# Patient Record
Sex: FEMALE | Race: BLACK OR AFRICAN AMERICAN | NOT HISPANIC OR LATINO | URBAN - METROPOLITAN AREA
[De-identification: names, ages, dates, MRNs, and addresses within clinical notes are randomized per-mention and may not be internally consistent; named-entity substitution may affect disease eponyms.]

---

## 2017-09-21 ENCOUNTER — APPOINTMENT (OUTPATIENT)
Dept: WOMENS IMAGING | Facility: HOSPITAL | Age: 62
End: 2017-09-21

## 2017-09-21 PROCEDURE — 77067 SCR MAMMO BI INCL CAD: CPT | Performed by: RADIOLOGY

## 2018-10-18 ENCOUNTER — APPOINTMENT (OUTPATIENT)
Dept: WOMENS IMAGING | Facility: HOSPITAL | Age: 63
End: 2018-10-18

## 2018-10-18 PROCEDURE — 77063 BREAST TOMOSYNTHESIS BI: CPT | Performed by: RADIOLOGY

## 2018-10-18 PROCEDURE — 77067 SCR MAMMO BI INCL CAD: CPT | Performed by: RADIOLOGY

## 2018-11-02 ENCOUNTER — APPOINTMENT (OUTPATIENT)
Dept: WOMENS IMAGING | Facility: HOSPITAL | Age: 63
End: 2018-11-02

## 2018-11-02 PROCEDURE — 77065 DX MAMMO INCL CAD UNI: CPT | Performed by: RADIOLOGY

## 2018-11-02 PROCEDURE — 77061 BREAST TOMOSYNTHESIS UNI: CPT | Performed by: RADIOLOGY

## 2018-11-02 PROCEDURE — 76641 ULTRASOUND BREAST COMPLETE: CPT | Performed by: RADIOLOGY

## 2018-11-02 PROCEDURE — G0279 TOMOSYNTHESIS, MAMMO: HCPCS | Performed by: RADIOLOGY

## 2019-10-21 ENCOUNTER — APPOINTMENT (OUTPATIENT)
Dept: WOMENS IMAGING | Facility: HOSPITAL | Age: 64
End: 2019-10-21

## 2019-10-21 PROCEDURE — 77067 SCR MAMMO BI INCL CAD: CPT | Performed by: RADIOLOGY

## 2019-10-21 PROCEDURE — 77063 BREAST TOMOSYNTHESIS BI: CPT | Performed by: RADIOLOGY

## 2020-11-03 ENCOUNTER — APPOINTMENT (OUTPATIENT)
Dept: WOMENS IMAGING | Facility: HOSPITAL | Age: 65
End: 2020-11-03

## 2020-11-03 PROCEDURE — 77063 BREAST TOMOSYNTHESIS BI: CPT | Performed by: RADIOLOGY

## 2020-11-03 PROCEDURE — 77067 SCR MAMMO BI INCL CAD: CPT | Performed by: RADIOLOGY

## 2020-12-09 ENCOUNTER — APPOINTMENT (OUTPATIENT)
Dept: WOMENS IMAGING | Facility: HOSPITAL | Age: 65
End: 2020-12-09

## 2020-12-09 PROCEDURE — 77065 DX MAMMO INCL CAD UNI: CPT | Performed by: RADIOLOGY

## 2020-12-09 PROCEDURE — G0279 TOMOSYNTHESIS, MAMMO: HCPCS | Performed by: RADIOLOGY

## 2020-12-09 PROCEDURE — 76641 ULTRASOUND BREAST COMPLETE: CPT | Performed by: RADIOLOGY

## 2021-01-06 ENCOUNTER — APPOINTMENT (OUTPATIENT)
Dept: WOMENS IMAGING | Facility: HOSPITAL | Age: 66
End: 2021-01-06

## 2021-01-06 PROCEDURE — 19000 PUNCTURE ASPIR CYST BREAST: CPT | Performed by: RADIOLOGY

## 2021-01-06 PROCEDURE — 76942 ECHO GUIDE FOR BIOPSY: CPT | Performed by: RADIOLOGY

## 2021-01-06 PROCEDURE — 88305 TISSUE EXAM BY PATHOLOGIST: CPT

## 2021-01-06 PROCEDURE — 88173 CYTOPATH EVAL FNA REPORT: CPT

## 2021-10-29 ENCOUNTER — APPOINTMENT (OUTPATIENT)
Dept: WOMENS IMAGING | Facility: HOSPITAL | Age: 66
End: 2021-10-29

## 2021-10-29 PROCEDURE — G0279 TOMOSYNTHESIS, MAMMO: HCPCS | Performed by: RADIOLOGY

## 2021-10-29 PROCEDURE — 77066 DX MAMMO INCL CAD BI: CPT | Performed by: RADIOLOGY

## 2021-10-29 PROCEDURE — 76641 ULTRASOUND BREAST COMPLETE: CPT | Performed by: RADIOLOGY

## 2022-10-31 ENCOUNTER — APPOINTMENT (OUTPATIENT)
Dept: WOMENS IMAGING | Facility: HOSPITAL | Age: 67
End: 2022-10-31

## 2022-10-31 PROCEDURE — 77063 BREAST TOMOSYNTHESIS BI: CPT | Performed by: RADIOLOGY

## 2022-10-31 PROCEDURE — 77067 SCR MAMMO BI INCL CAD: CPT | Performed by: RADIOLOGY

## 2023-11-03 ENCOUNTER — APPOINTMENT (OUTPATIENT)
Dept: WOMENS IMAGING | Facility: HOSPITAL | Age: 68
End: 2023-11-03
Payer: MEDICARE

## 2023-11-03 PROCEDURE — 77067 SCR MAMMO BI INCL CAD: CPT | Performed by: RADIOLOGY

## 2023-11-03 PROCEDURE — 77063 BREAST TOMOSYNTHESIS BI: CPT | Performed by: RADIOLOGY

## 2023-12-07 ENCOUNTER — TELEPHONE (OUTPATIENT)
Dept: ONCOLOGY | Facility: CLINIC | Age: 68
End: 2023-12-07
Payer: MEDICARE

## 2023-12-07 NOTE — TELEPHONE ENCOUNTER
Since I have not seen her or reviewed her information in detail yet, I'm not going to comment on that. Right now that is up to her and the physicians she has already been seeing. OLINDA

## 2023-12-07 NOTE — TELEPHONE ENCOUNTER
Since I have not seen her or reviewed her information in detail yet, I'm not going to comment on that. Right now that is up to her and the physicians she has already been seeing. OLINDA        Called the patient to let her know Dr. Palacios response above and she v/u.

## 2023-12-07 NOTE — TELEPHONE ENCOUNTER
Caller: Eveline Govea    Relationship: Self    Best call back number: [phone]    What is the best time to reach you: ANYTIME    Who are you requesting to speak with (clinical staff, provider,  specific staff member): CLINICAL         What was the call regarding:     HAS NEW PATIENT APPOINTMENT WITH DR MARIN.     WAS REFERRED BY DR ANAND AND DR ANAND IS RECOMMENDING GOING A HEAD AND GETTING A PORT PLACED    WANTED TO KNOW WHAT DR MARIN WOULD LIKE IF WOULD WANT HER TO GET PORT PLACED BEFORE CONSULT OR TO WAIT?     IT IS CURRENTLY SCHEDULED THIS COMING MONDAY DEC 11TH

## 2023-12-13 NOTE — PROGRESS NOTES
REFERRING PROVIDER:    Alhaji Butler MD  [address]  [address]  [place] [address]    REASON FOR CONSULTATION: Metastatic pancreas cancer    HISTORY OF PRESENT ILLNESS:  Eveline Govea is a 68 y.o. female who is referred today metastatic pancreas cancer    She has followed with Dr. Marshall with hematology at [place] for neutropenia    She presented with a several month history of abdominal pain.    11/22/2023: CT abdomen and pelvis with peritoneal carcinomatosis, small volume ascites, abnormal appearance of the body and tail of the pancreas consistent with primary pancreas malignancy with extrapancreatic extension and vascular involvement, shotty retroperitoneal lymphadenopathy, bladder wall thickening, nonocclusive thrombus of the left ovarian vein, noncalcified pulmonary nodules.    11/29/2023: Endoscopic ultrasound with biopsy of the pancreas mass showed adenocarcinoma    12/2/2023: PET CT scan with evident hypermetabolic malignancy in the pancreas body with evidence for peritoneal carcinomatosis with multiple hypermetabolic peritoneal implants, indeterminant small 3 mm right lower lobe lung nodule.    She had a port placed by Dr. Crooks with surgical oncology earlier this week.  On 12/13/2023 she saw Dr. Castillo with medical oncology at [place].  A clinical trial may be available there.  She has been referred to genetic counseling due to an extensive family history of breast and other cancers.  She states that she had BRCA testing done a number of years ago that was negative.  It appears that Dr. Castillo is evaluating for mutations otherwise as he evaluates her for candidacy of the clinical trial.  Otherwise he discussed palliative chemotherapy options including modified FOLFIRINOX and Gemzar/Abraxane.  She is also going to see medical oncologist at the [facility] next week as well.    She reports some mild abdominal discomfort.  She has not required any pain medication for this.  She  "denies any signs or symptoms of bowel obstruction.  She has a decreased appetite.  She is anxious.    Past Medical History:   Diagnosis Date    Arthritis     H/O mammogram 09/01/2015    Dr Lizzy Gary    Hypertension     Lupus     Pancreatic cancer 2023       Past Surgical History:   Procedure Laterality Date    COLONOSCOPY  11/2014    Dr Rodgers    FOOT SURGERY  01/01/1990    Dr Urbina    HYSTERECTOMY  1999    Dr Amin    TOE SURGERY Bilateral     TUBAL ABDOMINAL LIGATION  1984    Dr Collins       SOCIAL HISTORY:   reports that she has never smoked. She does not have any smokeless tobacco history on file. Drug use questions deferred to the physician. She reports that she does not drink alcohol.    FAMILY HISTORY:  family history includes Breast cancer in her mother; Cancer in her mother and sister; Diabetes in her father and mother; Heart attack in her father and mother; Heart disease in her father; Hypertension in her father and mother; Kidney failure in her mother.    ALLERGIES:  Allergies   Allergen Reactions    Ace Inhibitors        MEDICATIONS:  The medication list has been reviewed with the patient by the medical assistant, and the list has been updated in the electronic medical record, which I reviewed.  Medication dosages and frequencies were confirmed to be accurate.    REVIEW OF SYSTEMS  Review of Systems   Gastrointestinal:  Positive for abdominal pain.   Psychiatric/Behavioral:  The patient is nervous/anxious.    All other systems reviewed and are negative.      PHYSICAL EXAMINATION  Vitals:    12/14/23 0915   BP: 121/68   Pulse: 71   Resp: 18   Temp: 98.9 °F (37.2 °C)   TempSrc: Temporal   SpO2: 99%   Weight: 59.4 kg (130 lb 14.4 oz)   Height: 157.5 cm (62\")   PainSc:   4   PainLoc: Abdomen       Physical Exam  Vitals reviewed.   Constitutional:       Appearance: She is well-developed.   HENT:      Head: Normocephalic and atraumatic.      Nose: Nose normal.   Eyes:      Conjunctiva/sclera: Conjunctivae " normal.      Pupils: Pupils are equal, round, and reactive to light.   Cardiovascular:      Rate and Rhythm: Normal rate and regular rhythm.      Heart sounds: Normal heart sounds, S1 normal and S2 normal. No murmur heard.     No friction rub. No gallop.   Pulmonary:      Effort: Pulmonary effort is normal. No respiratory distress.      Breath sounds: Normal breath sounds. No stridor. No wheezing, rhonchi or rales.   Chest:      Chest wall: No tenderness.      Comments: Benign-appearing recently placed Mediport present in the right subclavian area  Abdominal:      General: Bowel sounds are normal. There is distension (Very mild).      Palpations: Abdomen is soft. There is no mass.      Tenderness: There is no abdominal tenderness (Very mild). There is no guarding or rebound.   Musculoskeletal:         General: Normal range of motion.      Cervical back: Neck supple.   Lymphadenopathy:      Cervical: No cervical adenopathy.      Upper Body:      Right upper body: No supraclavicular adenopathy.      Left upper body: No supraclavicular adenopathy.   Skin:     General: Skin is warm and dry.      Findings: No erythema or rash.   Neurological:      Mental Status: She is alert and oriented to person, place, and time.      Cranial Nerves: No cranial nerve deficit.      Sensory: No sensory deficit.   Psychiatric:         Behavior: Behavior normal.         Thought Content: Thought content normal.         Judgment: Judgment normal.         DIAGNOSTIC DATA:    Results for orders placed or performed in visit on 12/14/23   CBC Auto Differential    Specimen: Blood   Result Value Ref Range    WBC 4.94 3.40 - 10.80 10*3/mm3    RBC 3.89 3.77 - 5.28 10*6/mm3    Hemoglobin 11.8 (L) 12.0 - 15.9 g/dL    Hematocrit 33.1 (L) 34.0 - 46.6 %    MCV 85.1 79.0 - 97.0 fL    MCH 30.3 26.6 - 33.0 pg    MCHC 35.6 31.5 - 35.7 g/dL    RDW 11.9 (L) 12.3 - 15.4 %    RDW-SD 36.1 (L) 37.0 - 54.0 fl    MPV 12.4 (H) 6.0 - 12.0 fL    Platelets 145 140 - 450  10*3/mm3    Neutrophil % 34.4 (L) 42.7 - 76.0 %    Lymphocyte % 46.8 (H) 19.6 - 45.3 %    Monocyte % 8.5 5.0 - 12.0 %    Eosinophil % 0.8 0.3 - 6.2 %    Basophil % 1.2 0.0 - 1.5 %    Immature Grans % 8.3 (H) 0.0 - 0.5 %    Neutrophils, Absolute 1.70 1.70 - 7.00 10*3/mm3    Lymphocytes, Absolute 2.31 0.70 - 3.10 10*3/mm3    Monocytes, Absolute 0.42 0.10 - 0.90 10*3/mm3    Eosinophils, Absolute 0.04 0.00 - 0.40 10*3/mm3    Basophils, Absolute 0.06 0.00 - 0.20 10*3/mm3    Immature Grans, Absolute 0.41 (H) 0.00 - 0.05 10*3/mm3    nRBC 0.0 0.0 - 0.2 /100 WBC       IMAGING:    None reviewed    ASSESSMENT:    This is a 68 y.o. 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  11/22/2023: CT abdomen and pelvis with peritoneal carcinomatosis, small volume ascites, abnormal appearance of the body and tail of the pancreas consistent with primary pancreas malignancy with extrapancreatic extension and vascular involvement, shotty retroperitoneal lymphadenopathy, bladder wall thickening, nonocclusive thrombus of the left ovarian vein, noncalcified pulmonary nodules.  11/29/2023: Endoscopic ultrasound with biopsy of the pancreas mass showed adenocarcinoma  12/2/2023: PET CT scan with evident hypermetabolic malignancy in the pancreas body with evidence for peritoneal carcinomatosis with multiple hypermetabolic peritoneal implants, indeterminant small 3 mm right lower lobe lung nodule.  She had a port placed by Dr. Crooks with surgical oncology earlier this week.  On 12/13/2023 she saw Dr. Castillo with medical oncology at [place].  A clinical trial may be available there.  She has been referred to genetic counseling due to an extensive family history of breast and other cancers.  She states that she had BRCA testing done a number of years ago that was negative.  It appears that Dr. Castillo is evaluating for mutations otherwise as he evaluates  her for candidacy of the clinical trial.  Otherwise he discussed palliative chemotherapy options including modified FOLFIRINOX and Gemzar/Abraxane.  She is also going to see medical oncologist at the [facility] next week as well.    *Chronic leukopenia/neutropenia  Followed by Dr. Marshall at [place]  ANC is normal today at 1.70      PLAN:   Her diagnosis and potential treatment options were discussed with the patient and her  today.  I certainly agree with Dr. Castillo who she saw yesterday at [facility].  She may be eligible for a clinical trial there if she is interested.  Otherwise, we discussed modified FOLFIRINOX versus Gemzar/Abraxane.  I would likely favor starting with modified FOLFIRINOX and then adjusting therapy based on tolerance.  We discussed that her disease is unfortunately not curable.  We discussed that I would recommend next generation sequencing of the tumor and we can also send off analysis on the peripheral blood as well.  It sounds like tissue testing is already pending at [facility].  We discussed the role for genetic counseling and evaluation for inherited cancer syndromes given her extensive family history of malignancy.  She is going to be seen at the [facility] for a third opinion next week.  After that, she will make a decision as to where she would like to be treated.  We are certainly available to care for her if she desires, and I look forward to hearing back from her regarding her decision.    ORDERS PLACED DURING THIS ENCOUNTER  Orders Placed This Encounter   Procedures    CBC & Differential     Standing Status:   Future     Number of Occurrences:   1     Standing Expiration Date:   12/13/2024     Order Specific Question:   Manual Differential     Answer:   No     Order Specific Question:   Release to patient     Answer:   Routine Release [[id number]]      Metastatic pancreas cancer is a life-threatening situation.  We discussed high risk medication requiring  intensive monitoring today.

## 2023-12-14 ENCOUNTER — CONSULT (OUTPATIENT)
Dept: ONCOLOGY | Facility: CLINIC | Age: 68
End: 2023-12-14
Payer: MEDICARE

## 2023-12-14 ENCOUNTER — LAB (OUTPATIENT)
Dept: LAB | Facility: HOSPITAL | Age: 68
End: 2023-12-14
Payer: MEDICARE

## 2023-12-14 VITALS
SYSTOLIC BLOOD PRESSURE: 121 MMHG | OXYGEN SATURATION: 99 % | BODY MASS INDEX: 24.09 KG/M2 | TEMPERATURE: 98.9 F | RESPIRATION RATE: 18 BRPM | WEIGHT: 130.9 LBS | HEART RATE: 71 BPM | DIASTOLIC BLOOD PRESSURE: 68 MMHG | HEIGHT: 62 IN

## 2023-12-14 DIAGNOSIS — C25.9 MALIGNANT NEOPLASM OF PANCREAS, UNSPECIFIED LOCATION OF MALIGNANCY: ICD-10-CM

## 2023-12-14 DIAGNOSIS — C25.9 MALIGNANT NEOPLASM OF PANCREAS, UNSPECIFIED LOCATION OF MALIGNANCY: Primary | ICD-10-CM

## 2023-12-14 LAB
BASOPHILS # BLD AUTO: 0.06 10*3/MM3 (ref 0–0.2)
BASOPHILS NFR BLD AUTO: 1.2 % (ref 0–1.5)
DEPRECATED RDW RBC AUTO: 36.1 FL (ref 37–54)
EOSINOPHIL # BLD AUTO: 0.04 10*3/MM3 (ref 0–0.4)
EOSINOPHIL NFR BLD AUTO: 0.8 % (ref 0.3–6.2)
ERYTHROCYTE [DISTWIDTH] IN BLOOD BY AUTOMATED COUNT: 11.9 % (ref 12.3–15.4)
HCT VFR BLD AUTO: 33.1 % (ref 34–46.6)
HGB BLD-MCNC: 11.8 G/DL (ref 12–15.9)
IMM GRANULOCYTES # BLD AUTO: 0.41 10*3/MM3 (ref 0–0.05)
IMM GRANULOCYTES NFR BLD AUTO: 8.3 % (ref 0–0.5)
LYMPHOCYTES # BLD AUTO: 2.31 10*3/MM3 (ref 0.7–3.1)
LYMPHOCYTES NFR BLD AUTO: 46.8 % (ref 19.6–45.3)
MCH RBC QN AUTO: 30.3 PG (ref 26.6–33)
MCHC RBC AUTO-ENTMCNC: 35.6 G/DL (ref 31.5–35.7)
MCV RBC AUTO: 85.1 FL (ref 79–97)
MONOCYTES # BLD AUTO: 0.42 10*3/MM3 (ref 0.1–0.9)
MONOCYTES NFR BLD AUTO: 8.5 % (ref 5–12)
NEUTROPHILS NFR BLD AUTO: 1.7 10*3/MM3 (ref 1.7–7)
NEUTROPHILS NFR BLD AUTO: 34.4 % (ref 42.7–76)
NRBC BLD AUTO-RTO: 0 /100 WBC (ref 0–0.2)
PLATELET # BLD AUTO: 145 10*3/MM3 (ref 140–450)
PMV BLD AUTO: 12.4 FL (ref 6–12)
RBC # BLD AUTO: 3.89 10*6/MM3 (ref 3.77–5.28)
WBC NRBC COR # BLD AUTO: 4.94 10*3/MM3 (ref 3.4–10.8)

## 2023-12-14 PROCEDURE — 36415 COLL VENOUS BLD VENIPUNCTURE: CPT

## 2023-12-14 PROCEDURE — 85025 COMPLETE CBC W/AUTO DIFF WBC: CPT

## 2023-12-14 RX ORDER — PHENAZOPYRIDINE HYDROCHLORIDE 200 MG/1
TABLET, FILM COATED ORAL
COMMUNITY
Start: 2023-09-07

## 2023-12-14 RX ORDER — SULFAMETHOXAZOLE AND TRIMETHOPRIM 800; 160 MG/1; MG/1
TABLET ORAL
COMMUNITY
Start: 2023-09-07

## 2023-12-14 NOTE — LETTER
December 14, 2023       No Recipients    Patient: Eveline Govea   YOB: 1955   Date of Visit: 12/14/2023     Dear Alhaji Butler MD:       Thank you for referring Eveline Govea to me for evaluation. Below are the relevant portions of my assessment and plan of care.    If you have questions, please do not hesitate to call me. I look forward to following Eveline along with you.         Sincerely,        Ashkan Boyer MD        CC:   No Recipients    Ashkan Boyer MD  12/14/23 1110  Sign when Signing Visit  REFERRING PROVIDER:    Alhaji Butler MD  [address]  [address]  [place] [address]    REASON FOR CONSULTATION: Metastatic pancreas cancer    HISTORY OF PRESENT ILLNESS:  Eveline Govae is a 68 y.o. female who is referred today metastatic pancreas cancer    She has followed with Dr. Marshall with hematology at [place] for neutropenia    She presented with a several month history of abdominal pain.    11/22/2023: CT abdomen and pelvis with peritoneal carcinomatosis, small volume ascites, abnormal appearance of the body and tail of the pancreas consistent with primary pancreas malignancy with extrapancreatic extension and vascular involvement, shotty retroperitoneal lymphadenopathy, bladder wall thickening, nonocclusive thrombus of the left ovarian vein, noncalcified pulmonary nodules.    11/29/2023: Endoscopic ultrasound with biopsy of the pancreas mass showed adenocarcinoma    12/2/2023: PET CT scan with evident hypermetabolic malignancy in the pancreas body with evidence for peritoneal carcinomatosis with multiple hypermetabolic peritoneal implants, indeterminant small 3 mm right lower lobe lung nodule.    She had a port placed by Dr. Crooks with surgical oncology earlier this week.  On 12/13/2023 she saw Dr. Castillo with medical oncology at [place].  A clinical trial may be available there.  She has been referred to genetic counseling due to an extensive family history of breast  and other cancers.  She states that she had BRCA testing done a number of years ago that was negative.  It appears that Dr. Castillo is evaluating for mutations otherwise as he evaluates her for candidacy of the clinical trial.  Otherwise he discussed palliative chemotherapy options including modified FOLFIRINOX and Gemzar/Abraxane.  She is also going to see medical oncologist at the [facility] next week as well.    She reports some mild abdominal discomfort.  She has not required any pain medication for this.  She denies any signs or symptoms of bowel obstruction.  She has a decreased appetite.  She is anxious.    Past Medical History:   Diagnosis Date   • Arthritis    • H/O mammogram 09/01/2015    Dr Lizzy Gary   • Hypertension    • Lupus    • Pancreatic cancer 2023       Past Surgical History:   Procedure Laterality Date   • COLONOSCOPY  11/2014    Dr Rodgers   • FOOT SURGERY  01/01/1990    Dr Urbina   • HYSTERECTOMY  1999    Dr Amin   • TOE SURGERY Bilateral    • TUBAL ABDOMINAL LIGATION  1984    Dr Collins       SOCIAL HISTORY:   reports that she has never smoked. She does not have any smokeless tobacco history on file. Drug use questions deferred to the physician. She reports that she does not drink alcohol.    FAMILY HISTORY:  family history includes Breast cancer in her mother; Cancer in her mother and sister; Diabetes in her father and mother; Heart attack in her father and mother; Heart disease in her father; Hypertension in her father and mother; Kidney failure in her mother.    ALLERGIES:  Allergies   Allergen Reactions   • Ace Inhibitors        MEDICATIONS:  The medication list has been reviewed with the patient by the medical assistant, and the list has been updated in the electronic medical record, which I reviewed.  Medication dosages and frequencies were confirmed to be accurate.    REVIEW OF SYSTEMS  Review of Systems   Gastrointestinal:  Positive for abdominal pain.  "  Psychiatric/Behavioral:  The patient is nervous/anxious.    All other systems reviewed and are negative.      PHYSICAL EXAMINATION  Vitals:    12/14/23 0915   BP: 121/68   Pulse: 71   Resp: 18   Temp: 98.9 °F (37.2 °C)   TempSrc: Temporal   SpO2: 99%   Weight: 59.4 kg (130 lb 14.4 oz)   Height: 157.5 cm (62\")   PainSc:   4   PainLoc: Abdomen       Physical Exam  Vitals reviewed.   Constitutional:       Appearance: She is well-developed.   HENT:      Head: Normocephalic and atraumatic.      Nose: Nose normal.   Eyes:      Conjunctiva/sclera: Conjunctivae normal.      Pupils: Pupils are equal, round, and reactive to light.   Cardiovascular:      Rate and Rhythm: Normal rate and regular rhythm.      Heart sounds: Normal heart sounds, S1 normal and S2 normal. No murmur heard.     No friction rub. No gallop.   Pulmonary:      Effort: Pulmonary effort is normal. No respiratory distress.      Breath sounds: Normal breath sounds. No stridor. No wheezing, rhonchi or rales.   Chest:      Chest wall: No tenderness.      Comments: Benign-appearing recently placed Mediport present in the right subclavian area  Abdominal:      General: Bowel sounds are normal. There is distension (Very mild).      Palpations: Abdomen is soft. There is no mass.      Tenderness: There is no abdominal tenderness (Very mild). There is no guarding or rebound.   Musculoskeletal:         General: Normal range of motion.      Cervical back: Neck supple.   Lymphadenopathy:      Cervical: No cervical adenopathy.      Upper Body:      Right upper body: No supraclavicular adenopathy.      Left upper body: No supraclavicular adenopathy.   Skin:     General: Skin is warm and dry.      Findings: No erythema or rash.   Neurological:      Mental Status: She is alert and oriented to person, place, and time.      Cranial Nerves: No cranial nerve deficit.      Sensory: No sensory deficit.   Psychiatric:         Behavior: Behavior normal.         Thought Content: " Thought content normal.         Judgment: Judgment normal.         DIAGNOSTIC DATA:    Results for orders placed or performed in visit on 12/14/23   CBC Auto Differential    Specimen: Blood   Result Value Ref Range    WBC 4.94 3.40 - 10.80 10*3/mm3    RBC 3.89 3.77 - 5.28 10*6/mm3    Hemoglobin 11.8 (L) 12.0 - 15.9 g/dL    Hematocrit 33.1 (L) 34.0 - 46.6 %    MCV 85.1 79.0 - 97.0 fL    MCH 30.3 26.6 - 33.0 pg    MCHC 35.6 31.5 - 35.7 g/dL    RDW 11.9 (L) 12.3 - 15.4 %    RDW-SD 36.1 (L) 37.0 - 54.0 fl    MPV 12.4 (H) 6.0 - 12.0 fL    Platelets 145 140 - 450 10*3/mm3    Neutrophil % 34.4 (L) 42.7 - 76.0 %    Lymphocyte % 46.8 (H) 19.6 - 45.3 %    Monocyte % 8.5 5.0 - 12.0 %    Eosinophil % 0.8 0.3 - 6.2 %    Basophil % 1.2 0.0 - 1.5 %    Immature Grans % 8.3 (H) 0.0 - 0.5 %    Neutrophils, Absolute 1.70 1.70 - 7.00 10*3/mm3    Lymphocytes, Absolute 2.31 0.70 - 3.10 10*3/mm3    Monocytes, Absolute 0.42 0.10 - 0.90 10*3/mm3    Eosinophils, Absolute 0.04 0.00 - 0.40 10*3/mm3    Basophils, Absolute 0.06 0.00 - 0.20 10*3/mm3    Immature Grans, Absolute 0.41 (H) 0.00 - 0.05 10*3/mm3    nRBC 0.0 0.0 - 0.2 /100 WBC       IMAGING:    None reviewed    ASSESSMENT:    This is a 68 y.o. 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  11/22/2023: CT abdomen and pelvis with peritoneal carcinomatosis, small volume ascites, abnormal appearance of the body and tail of the pancreas consistent with primary pancreas malignancy with extrapancreatic extension and vascular involvement, shotty retroperitoneal lymphadenopathy, bladder wall thickening, nonocclusive thrombus of the left ovarian vein, noncalcified pulmonary nodules.  11/29/2023: Endoscopic ultrasound with biopsy of the pancreas mass showed adenocarcinoma  12/2/2023: PET CT scan with evident hypermetabolic malignancy in the pancreas body with evidence for peritoneal carcinomatosis with  multiple hypermetabolic peritoneal implants, indeterminant small 3 mm right lower lobe lung nodule.  She had a port placed by Dr. Crooks with surgical oncology earlier this week.  On 12/13/2023 she saw Dr. Castillo with medical oncology at [place].  A clinical trial may be available there.  She has been referred to genetic counseling due to an extensive family history of breast and other cancers.  She states that she had BRCA testing done a number of years ago that was negative.  It appears that Dr. Castillo is evaluating for mutations otherwise as he evaluates her for candidacy of the clinical trial.  Otherwise he discussed palliative chemotherapy options including modified FOLFIRINOX and Gemzar/Abraxane.  She is also going to see medical oncologist at the [facility] next week as well.    *Chronic leukopenia/neutropenia  Followed by Dr. Marshall at [place]  ANC is normal today at 1.70      PLAN:   Her diagnosis and potential treatment options were discussed with the patient and her  today.  I certainly agree with Dr. Castillo who she saw yesterday at [place].  She may be eligible for a clinical trial there if she is interested.  Otherwise, we discussed modified FOLFIRINOX versus Gemzar/Abraxane.  I would likely favor starting with modified FOLFIRINOX and then adjusting therapy based on tolerance.  We discussed that her disease is unfortunately not curable.  We discussed that I would recommend next generation sequencing of the tumor and we can also send off analysis on the peripheral blood as well.  It sounds like tissue testing is already pending at [facility].  We discussed the role for genetic counseling and evaluation for inherited cancer syndromes given her extensive family history of malignancy.  She is going to be seen at the [facility] for a third opinion next week.  After that, she will make a decision as to where she would like to be treated.  We are certainly available to care for  her if she desires, and I look forward to hearing back from her regarding her decision.    ORDERS PLACED DURING THIS ENCOUNTER  Orders Placed This Encounter   Procedures   • CBC & Differential     Standing Status:   Future     Number of Occurrences:   1     Standing Expiration Date:   12/13/2024     Order Specific Question:   Manual Differential     Answer:   No     Order Specific Question:   Release to patient     Answer:   Routine Release [[id number]]      Metastatic pancreas cancer is a life-threatening situation.  We discussed high risk medication requiring intensive monitoring today.

## 2023-12-19 ENCOUNTER — TELEPHONE (OUTPATIENT)
Dept: OTHER | Facility: HOSPITAL | Age: 68
End: 2023-12-19
Payer: MEDICARE

## 2023-12-19 NOTE — TELEPHONE ENCOUNTER
Oncology Social Work     Distress Level: 8 (12/14/2023  9:00 AM)       OSW reached out to patient related to her distress score on 12/14 of 8. She saw Dr. Boyer for a second opinion. Patient was diagnosed with metastatic pancreatic cancer. Patient is still unsure which oncologist she would like to chose to manage her care. Patient lives with her  and he is her biggest support. Patient's  also provides her transportation. She is concerned that her  won't be able to always provide transportation. OSW will assist with transportation back-up options. Patient also voiced that she has concern about keeping her home clean / tidy with her treatment plan. OSW can assist with resources.   Patient voiced that she is very overwhelmed with information. Patient could potentially benefit from seeing behavioral health oncology.     Clarita MCCONNELL

## 2023-12-21 ENCOUNTER — TELEPHONE (OUTPATIENT)
Dept: ONCOLOGY | Facility: CLINIC | Age: 68
End: 2023-12-21
Payer: MEDICARE

## 2023-12-21 NOTE — TELEPHONE ENCOUNTER
Called the patient to let her know matilda was not in but I would be ahappy to hear her questions. She wanted to know about the chemo ball and I did education it being worn for 48 hours and then being d/c at office. She then asked about if ti was snowing and I let her know we would be here and help, but I knew that was a valid concern. She stated she had to think about the drive for them and let us know. She v/u.

## 2023-12-21 NOTE — TELEPHONE ENCOUNTER
Caller: Eveline Govea    Relationship to patient: Self    Best call back number: [phone]    Patient is needing: TO TALK TO DORIAN CRUMP. SHE HAS ADDITIONAL QUESTIONS SHE WOULD LIKE TO TALK TO DORIAN ABOUT.

## 2024-01-03 DIAGNOSIS — C25.9 MALIGNANT NEOPLASM OF PANCREAS, UNSPECIFIED LOCATION OF MALIGNANCY: Primary | ICD-10-CM

## 2024-01-03 NOTE — PROGRESS NOTES
"[facility] GROUP OUTPATIENT FOLLOW UP CLINIC VISIT    REASON FOR FOLLOW-UP:    Metastatic pancreas cancer with carcinomatosis    HISTORY OF PRESENT ILLNESS:  Eveline Govea is a 68 y.o. female who returns today for follow up of the above issue.      After she was seen here last she was seen at the [facility] and ultimately has decided to pursue treatment here.  She complains of pelvic discomfort but is not really taking any medication for this.  She has a decreased appetite with altered taste and has lost some weight.       REVIEW OF SYSTEMS:  As per the [facility]    PHYSICAL EXAMINATION:  Vitals:    01/04/24 1027   BP: 129/70   Pulse: 72   Resp: 18   Temp: 98 °F (36.7 °C)   TempSrc: Temporal   SpO2: 99%   Weight: 57.1 kg (125 lb 14.4 oz)   Height: 157.5 cm (62.01\")   PainSc: 0-No pain       GENERAL:  Well-developed well-nourished female; awake, alert and oriented, in no acute distress.  SKIN:  Warm and dry, without rashes, purpura, or petechiae.  HEAD:  Normocephalic, atraumatic.  Wearing a facemask.  EARS:  Hearing intact.  LYMPHATICS:  No cervical, supraclavicular, axillary lymphadenopathy.  CHEST:  Lungs are clear to auscultation bilaterally.  No wheezes, rales, or rhonchi.  HEART:  Regular rate; normal rhythm.  No murmurs, gallops or rubs.  ABDOMEN:  Soft, non-tender, non-distended.  Normal active bowel sounds.  No organomegaly.  EXTREMITIES:  No clubbing, cyanosis, or edema.  NEUROLOGICAL:  No focal neurologic deficits.    DIAGNOSTIC DATA:  Results for orders placed or performed in visit on 01/04/24   CBC Auto Differential    Specimen: Blood   Result Value Ref Range    WBC 3.75 3.40 - 10.80 10*3/mm3    RBC 4.09 3.77 - 5.28 10*6/mm3    Hemoglobin 12.1 12.0 - 15.9 g/dL    Hematocrit 34.0 34.0 - 46.6 %    MCV 83.1 79.0 - 97.0 fL    MCH 29.6 26.6 - 33.0 pg    MCHC 35.6 31.5 - 35.7 g/dL    RDW 11.6 (L) 12.3 - 15.4 %    RDW-SD 35.0 (L) 37.0 - 54.0 fl    MPV 11.5 6.0 - 12.0 fL    Platelets 156 140 " - 450 10*3/mm3    Neutrophil % 54.4 42.7 - 76.0 %    Lymphocyte % 29.9 19.6 - 45.3 %    Monocyte % 13.6 (H) 5.0 - 12.0 %    Eosinophil % 1.3 0.3 - 6.2 %    Basophil % 0.3 0.0 - 1.5 %    Immature Grans % 0.5 0.0 - 0.5 %    Neutrophils, Absolute 2.04 1.70 - 7.00 10*3/mm3    Lymphocytes, Absolute 1.12 0.70 - 3.10 10*3/mm3    Monocytes, Absolute 0.51 0.10 - 0.90 10*3/mm3    Eosinophils, Absolute 0.05 0.00 - 0.40 10*3/mm3    Basophils, Absolute 0.01 0.00 - 0.20 10*3/mm3    Immature Grans, Absolute 0.02 0.00 - 0.05 10*3/mm3    nRBC 0.0 0.0 - 0.2 /100 WBC       IMAGING:    No new imaging reviewed    ASSESSMENT:    This is a 68 y.o. 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  11/22/2023: CT abdomen and pelvis with peritoneal carcinomatosis, small volume ascites, abnormal appearance of the body and tail of the pancreas consistent with primary pancreas malignancy with extrapancreatic extension and vascular involvement, shotty retroperitoneal lymphadenopathy, bladder wall thickening, nonocclusive thrombus of the left ovarian vein, noncalcified pulmonary nodules.  11/29/2023: Endoscopic ultrasound with biopsy of the pancreas mass showed adenocarcinoma  12/2/2023: PET CT scan with evident hypermetabolic malignancy in the pancreas body with evidence for peritoneal carcinomatosis with multiple hypermetabolic peritoneal implants, indeterminant small 3 mm right lower lobe lung nodule.  She had a port placed by Dr. Crooks with surgical oncology earlier this week.  On 12/13/2023 she saw Dr. Castillo with medical oncology at [facility].  A clinical trial may be available there.  She has been referred to genetic counseling due to an extensive family history of breast and other cancers.  She states that she had BRCA testing done a number of years ago that was negative.  It appears that Dr. Castillo is evaluating for mutations otherwise as he  evaluates her for candidacy of the clinical trial.  Otherwise he discussed palliative chemotherapy options including modified FOLFIRINOX and Gemzar/Abraxane.  She is also going to see medical oncologist at the [facility] next week as well.  She is seen again on 1/4/2024 having been seen at the [facility].  She is interested in treatment here with our group.  Plan modified FOLFIRINOX with growth factor support due to chronic leukopenia/neutropenia.    *Chronic leukopenia/neutropenia  Followed by Dr. Marshall at [facility]  ANC is normal today at 2.04    *Cancer related pain: This is mild at this point we will continue to monitor.  She is not really taking any pain medication for this at this point.    *Anorexia and dysgeusia with weight loss  We will have her see our nutritionist    *Anxiety regarding her health  We discussed potential referral to our supportive oncology program today but she prefers not to schedule this at this time.  We will continue to address this.    PLAN:   [facility] NGS in the blood and we will send this on the tissue as well.  Germline genetic assessment also with [facility]  Nutrition assessment  Emla cream for her Mediport  Plan modified FOLFIRINOX with growth factor support every 2 weeks at the usual doses.  Oxaliplatin 85 mg/m², irinotecan 150 mg/m², 5-FU 2400 mg/m² over 46 hours.  Neulasta on day 3 if approved by insurance.  Treatment is palliative and the patient understands this.  Potential adverse effects were discussed.  She will have an education session.  Start date is 1/15/2024.  She has a Mediport in place.  I will see her back with cycle 2.

## 2024-01-04 ENCOUNTER — OFFICE VISIT (OUTPATIENT)
Dept: ONCOLOGY | Facility: CLINIC | Age: 69
End: 2024-01-04
Payer: MEDICARE

## 2024-01-04 ENCOUNTER — LAB (OUTPATIENT)
Dept: LAB | Facility: HOSPITAL | Age: 69
End: 2024-01-04
Payer: MEDICARE

## 2024-01-04 VITALS
HEIGHT: 62 IN | BODY MASS INDEX: 23.17 KG/M2 | OXYGEN SATURATION: 99 % | HEART RATE: 72 BPM | DIASTOLIC BLOOD PRESSURE: 70 MMHG | TEMPERATURE: 98 F | RESPIRATION RATE: 18 BRPM | SYSTOLIC BLOOD PRESSURE: 129 MMHG | WEIGHT: 125.9 LBS

## 2024-01-04 DIAGNOSIS — C25.9 MALIGNANT NEOPLASM OF PANCREAS, UNSPECIFIED LOCATION OF MALIGNANCY: ICD-10-CM

## 2024-01-04 DIAGNOSIS — C25.7 MALIGNANT NEOPLASM OF OTHER PARTS OF PANCREAS: ICD-10-CM

## 2024-01-04 DIAGNOSIS — Z79.899 HIGH RISK MEDICATION USE: ICD-10-CM

## 2024-01-04 DIAGNOSIS — C25.9 MALIGNANT NEOPLASM OF PANCREAS, UNSPECIFIED LOCATION OF MALIGNANCY: Primary | ICD-10-CM

## 2024-01-04 LAB
ALBUMIN SERPL-MCNC: 4.2 G/DL (ref 3.5–5.2)
ALBUMIN/GLOB SERPL: 1.2 G/DL
ALP SERPL-CCNC: 73 U/L (ref 39–117)
ALT SERPL W P-5'-P-CCNC: 15 U/L (ref 1–33)
ANION GAP SERPL CALCULATED.3IONS-SCNC: 13.4 MMOL/L (ref 5–15)
AST SERPL-CCNC: 31 U/L (ref 1–32)
BASOPHILS # BLD AUTO: 0.01 10*3/MM3 (ref 0–0.2)
BASOPHILS NFR BLD AUTO: 0.3 % (ref 0–1.5)
BILIRUB SERPL-MCNC: 0.3 MG/DL (ref 0–1.2)
BUN SERPL-MCNC: 21 MG/DL (ref 8–23)
BUN/CREAT SERPL: 25 (ref 7–25)
CALCIUM SPEC-SCNC: 10.3 MG/DL (ref 8.6–10.5)
CANCER AG19-9 SERPL-ACNC: 626 U/ML
CHLORIDE SERPL-SCNC: 98 MMOL/L (ref 98–107)
CO2 SERPL-SCNC: 30.6 MMOL/L (ref 22–29)
CREAT SERPL-MCNC: 0.84 MG/DL (ref 0.57–1)
DEPRECATED RDW RBC AUTO: 35 FL (ref 37–54)
EGFRCR SERPLBLD CKD-EPI 2021: 75.3 ML/MIN/1.73
EOSINOPHIL # BLD AUTO: 0.05 10*3/MM3 (ref 0–0.4)
EOSINOPHIL NFR BLD AUTO: 1.3 % (ref 0.3–6.2)
ERYTHROCYTE [DISTWIDTH] IN BLOOD BY AUTOMATED COUNT: 11.6 % (ref 12.3–15.4)
GLOBULIN UR ELPH-MCNC: 3.6 GM/DL
GLUCOSE SERPL-MCNC: 152 MG/DL (ref 65–99)
HCT VFR BLD AUTO: 34 % (ref 34–46.6)
HGB BLD-MCNC: 12.1 G/DL (ref 12–15.9)
IMM GRANULOCYTES # BLD AUTO: 0.02 10*3/MM3 (ref 0–0.05)
IMM GRANULOCYTES NFR BLD AUTO: 0.5 % (ref 0–0.5)
LYMPHOCYTES # BLD AUTO: 1.12 10*3/MM3 (ref 0.7–3.1)
LYMPHOCYTES NFR BLD AUTO: 29.9 % (ref 19.6–45.3)
MCH RBC QN AUTO: 29.6 PG (ref 26.6–33)
MCHC RBC AUTO-ENTMCNC: 35.6 G/DL (ref 31.5–35.7)
MCV RBC AUTO: 83.1 FL (ref 79–97)
MONOCYTES # BLD AUTO: 0.51 10*3/MM3 (ref 0.1–0.9)
MONOCYTES NFR BLD AUTO: 13.6 % (ref 5–12)
NEUTROPHILS NFR BLD AUTO: 2.04 10*3/MM3 (ref 1.7–7)
NEUTROPHILS NFR BLD AUTO: 54.4 % (ref 42.7–76)
NRBC BLD AUTO-RTO: 0 /100 WBC (ref 0–0.2)
PLATELET # BLD AUTO: 156 10*3/MM3 (ref 140–450)
PMV BLD AUTO: 11.5 FL (ref 6–12)
POTASSIUM SERPL-SCNC: 3.6 MMOL/L (ref 3.5–5.2)
PROT SERPL-MCNC: 7.8 G/DL (ref 6–8.5)
RBC # BLD AUTO: 4.09 10*6/MM3 (ref 3.77–5.28)
SODIUM SERPL-SCNC: 142 MMOL/L (ref 136–145)
WBC NRBC COR # BLD AUTO: 3.75 10*3/MM3 (ref 3.4–10.8)

## 2024-01-04 PROCEDURE — 85025 COMPLETE CBC W/AUTO DIFF WBC: CPT

## 2024-01-04 PROCEDURE — 86301 IMMUNOASSAY TUMOR CA 19-9: CPT | Performed by: INTERNAL MEDICINE

## 2024-01-04 PROCEDURE — 80053 COMPREHEN METABOLIC PANEL: CPT

## 2024-01-04 PROCEDURE — 36415 COLL VENOUS BLD VENIPUNCTURE: CPT

## 2024-01-04 RX ORDER — ATROPINE SULFATE 1 MG/ML
0.25 INJECTION, SOLUTION INTRAMUSCULAR; INTRAVENOUS; SUBCUTANEOUS
OUTPATIENT
Start: 2024-01-15

## 2024-01-04 RX ORDER — LIDOCAINE AND PRILOCAINE 25; 25 MG/G; MG/G
CREAM TOPICAL
Qty: 30 G | Refills: 1 | Status: SHIPPED | OUTPATIENT
Start: 2024-01-04

## 2024-01-04 RX ORDER — DIPHENHYDRAMINE HYDROCHLORIDE 50 MG/ML
50 INJECTION INTRAMUSCULAR; INTRAVENOUS AS NEEDED
OUTPATIENT
Start: 2024-01-15

## 2024-01-04 RX ORDER — DEXTROSE MONOHYDRATE 50 MG/ML
20 INJECTION, SOLUTION INTRAVENOUS ONCE
OUTPATIENT
Start: 2024-01-15

## 2024-01-04 RX ORDER — FAMOTIDINE 10 MG/ML
20 INJECTION, SOLUTION INTRAVENOUS AS NEEDED
OUTPATIENT
Start: 2024-01-15

## 2024-01-04 RX ORDER — PALONOSETRON 0.05 MG/ML
0.25 INJECTION, SOLUTION INTRAVENOUS ONCE
OUTPATIENT
Start: 2024-01-15

## 2024-01-05 ENCOUNTER — TELEPHONE (OUTPATIENT)
Dept: OTHER | Facility: HOSPITAL | Age: 69
End: 2024-01-05
Payer: MEDICARE

## 2024-01-05 NOTE — TELEPHONE ENCOUNTER
Oncology Social Work     OSW reached out to patient for resource follow-up. OSW briefly described programs for transportation and housing keeping services that are available to her. Patient and OSW agreed to meet in person after her appt next week 1/11.     Clarita MCCONNELL

## 2024-01-10 NOTE — PROGRESS NOTES
[facility] Hematology/Oncology Treatment Plan Summary    Name: Eveline Govea  [facility]# [id number]  MD: Dr. Boyer    Diagnosis:     ICD-10-CM ICD-9-CM   1. Malignant neoplasm of pancreas, unspecified location of malignancy  C25.9 157.9      Goal of treatment: disease control    Treatment Medication(s):   Oxaliplatin  Leucovorin  Irinotecan  Fluorouracil (5-FU)    Frequency: Treatment every 14 days    Number of cycles: 12 cycles    Starting on: 1/15/2024    Items for home use: Senokot-S (for constipation), Imodium AD (for diarrhea), and Thermometer    Rx written for: [x] Nausea    [] Pre-Treatment   ondansetron 8 mg by mouth every 8 hours as needed for nausea    Notes: Disconnect 5-FU ball on day 3 of each treatment cycle    Completing Provider: DEVAN Wilkins           Date/time: 01/11/2024      Please note: You will be seen by a provider frequently with your treatment plan. This plan may change depending on many factors, if so, this will be discussed with you by your physician.  Last update 03/2022.

## 2024-01-11 ENCOUNTER — DOCUMENTATION (OUTPATIENT)
Dept: OTHER | Facility: HOSPITAL | Age: 69
End: 2024-01-11
Payer: MEDICARE

## 2024-01-11 ENCOUNTER — OFFICE VISIT (OUTPATIENT)
Dept: ONCOLOGY | Facility: CLINIC | Age: 69
End: 2024-01-11
Payer: MEDICARE

## 2024-01-11 ENCOUNTER — PATIENT OUTREACH (OUTPATIENT)
Dept: OTHER | Facility: HOSPITAL | Age: 69
End: 2024-01-11
Payer: MEDICARE

## 2024-01-11 VITALS
DIASTOLIC BLOOD PRESSURE: 78 MMHG | HEIGHT: 62 IN | TEMPERATURE: 98.4 F | BODY MASS INDEX: 22.93 KG/M2 | SYSTOLIC BLOOD PRESSURE: 123 MMHG | WEIGHT: 124.6 LBS | HEART RATE: 76 BPM | OXYGEN SATURATION: 98 %

## 2024-01-11 DIAGNOSIS — C25.7 MALIGNANT NEOPLASM OF OTHER PARTS OF PANCREAS: Primary | ICD-10-CM

## 2024-01-11 DIAGNOSIS — C25.7 MALIGNANT NEOPLASM OF OTHER PARTS OF PANCREAS: ICD-10-CM

## 2024-01-11 DIAGNOSIS — Z79.899 HIGH RISK MEDICATION USE: ICD-10-CM

## 2024-01-11 DIAGNOSIS — C25.9 MALIGNANT NEOPLASM OF PANCREAS, UNSPECIFIED LOCATION OF MALIGNANCY: Primary | ICD-10-CM

## 2024-01-11 PROCEDURE — 1125F AMNT PAIN NOTED PAIN PRSNT: CPT | Performed by: NURSE PRACTITIONER

## 2024-01-11 PROCEDURE — 99215 OFFICE O/P EST HI 40 MIN: CPT | Performed by: NURSE PRACTITIONER

## 2024-01-11 PROCEDURE — 3074F SYST BP LT 130 MM HG: CPT | Performed by: NURSE PRACTITIONER

## 2024-01-11 PROCEDURE — 3078F DIAST BP <80 MM HG: CPT | Performed by: NURSE PRACTITIONER

## 2024-01-11 RX ORDER — ONDANSETRON HYDROCHLORIDE 8 MG/1
8 TABLET, FILM COATED ORAL 3 TIMES DAILY PRN
Qty: 30 TABLET | Refills: 5 | Status: SHIPPED | OUTPATIENT
Start: 2024-01-11

## 2024-01-11 NOTE — PROGRESS NOTES
TREATMENT  PREPARATION    Eveline Govea  [id number]  1955    Chief Complaint: Treatment preparation and needs assessment    History of present illness:  Eveline Govea is a 68 y.o. year old female who is here today for treatment preparation and needs assessment.  The patient has been diagnosed with   Encounter Diagnosis   Name Primary?    Malignant neoplasm of pancreas, unspecified location of malignancy Yes    and is scheduled to begin treatment with:     Oncology History:    Oncology/Hematology History   Malignant neoplasm of other parts of pancreas   1/4/2024 Initial Diagnosis    Malignant neoplasm of other parts of pancreas     1/15/2024 -  Chemotherapy    OP PANCREATIC mFOLFIRINOX (OXALIplatin / Leucovorin / Irinotecan / Fluorouracil CIV)         The current medication list and allergy list were reviewed and reconciled.     Past Medical History, Past Surgical History, Social History, Family History have been reviewed and are without significant changes except as mentioned.    Physical Exam:    Vitals:    01/11/24 1315   BP: 123/78   Pulse: 76   Temp: 98.4 °F (36.9 °C)   SpO2: 98%     Vitals:    01/11/24 1315   PainSc:   6   PainLoc: Generalized  Comment: patient states she exxperiencies pain everyday        ECOG score: 0             Physical Exam  HENT:      Head: Normocephalic and atraumatic.   Eyes:      Extraocular Movements: Extraocular movements intact.      Conjunctiva/sclera: Conjunctivae normal.   Pulmonary:      Effort: Pulmonary effort is normal. No respiratory distress.   Neurological:      General: No focal deficit present.      Mental Status: She is alert and oriented to person, place, and time.   Psychiatric:         Mood and Affect: Mood normal.         Behavior: Behavior normal.           NEEDS ASSESSMENTS    Genetics  The patient's new diagnosis and family history have been reviewed for genetic counseling needs. The patient will not be referred..     Psychosocial and Barriers to  care  The patient has completed a PHQ-9 Depression Screening and the Distress Thermometer (DT) today.  PHQ-9 results show PHQ-2 Total Score:   PHQ-9 Total Score: PHQ-9 Total Score:       The patient scored their distress today as   on a scale of 0-10 with 0 being no distress and 10 being extreme distress. Problems marked by the patient as being an issue for them within the last week include   .      Results were reviewed along with psychosocial resources offered by our cancer center.  Our Supportive Oncology team will be flagged for a score of 4 or above, and a same day call will be made for a score of 9 or 10.  A mental health referral is offered at that time. Patients who score less than 4 have been educated on our support services and can be referred to our  upon request.  The patient will not be referred to our .       Nutrition  The patient has completed the malnutrition screening today. They scored Malnutrition Screening Tool  Have you recently lost weight without trying?  If yes, how much weight have you lost?: 0--> No  Have you been eating poorly because of a decreased appetite?: 0--> No  MST score: 0   with a score of 0-1 meaning not at risk in a score of 2 or greater meaning at risk.  Patients with a score of 3 or higher will be referred to our oncology dietitian for support. Patients beginning at risk treatment regimens or who have dietary concerns will also be referred to our oncology dietitian. The patient will not be referred.    Functional Assessment  Persons who are age 70 or greater will be screened for qualification of a comprehensive geriatric assessment by our survivorship nurse practitioner.  Older adults with cancer face unique challenges. These may include an increased risk of drug reactions, financial burdens, and caregiver stress. The patient scored   . Patients scoring 14 or lower will referred for an older adult functional assessment with the survivorship advanced  "practice registered nurse to ensure all needed support is provided as patients plan for their treatments. NOT APPLICABLE    Intravenous Access Assessment  The patient and I discussed planned intravenous chemo/biotherapy as well as other IV treatments that are often needed throughout the course of treatment. These may include, but are not limited to blood transfusions, antibiotics, and IV hydration. Discussed that depending on selected treatment and vein assessment, patient may require venous access device (VAD) which could include but not limited to a Mediport or PICC line. Risks and benefits of VADs reviewed. The patient will be treated via Port.    Reproductive/Sexual Activity   People should avoid becoming pregnant and should not get a partner pregnant while undergoing chemo/biotherapy.  People of childbearing age should use effective contraception during active therapy. The best recommendation for all people is to use a barrier method for a minimum of 1 week after the last infusion of chemo/biotherapy to prevent your partner being exposed to byproducts from treatment medications in bodily fluids. Effective contraception should be discussed with your oncology team to make sure it is safe to take based on your diagnosis. Possible options include oral contraceptives, barrier methods. Chemo/biotherapy can change your ability to reproduce children in the future.  There are options for fertility preservation. NOT APPLICABLE    Advanced Care Planning  Advance Care Planning   The patient and I discussed advanced care planning, \"Conversations that Matter\".   This service is offered for development of advance directives with a certified ACP facilitator.  The patient does not have an up-to-date advanced directive. This document is not on file with our office. The patient is not interested in an appointment with one of our facilitators to create or update their advanced directives.    Have you reviewed your Advance " Directive and is it valid for this stay?: Not applicable          Smoking cessation  Tobacco Use: Unknown (1/11/2024)    Patient History     Smoking Tobacco Use: Never     Smokeless Tobacco Use: Unknown     Passive Exposure: Not on file       Patient and I discussed their tobacco use history. Referral will not be made for smoking cessation.      Palliative Care  When appropriate, the patient and I discussed the availability palliative care services and when appropriate Hospice care. Palliative care is not the same as Hospice care which was explained to the patient.NOT APPLICABLE.    Survivorship   When appropriate, we discussed that we will refer the patient to survivorship clinic to discuss next steps following completion of planned treatment.  Reviewed this visit will include assessment of your physical, psychological, functional, and spiritual needs as a survivor and the need at attend this visit when scheduled.    TREATMENT EDUCATION    Today I met with the patient to discuss the chemo/biotherapy regimen recommended for treatment of Malignant neoplasm of pancreas, unspecified location of malignancy  .  The patient was given explanation of treatment premed side effects including office policy that prohibits patients to drive if sedating medications are administered, MD explanation given regarding benefits, side effects, toxicities and goals of treatment.  The patient received a Chemotherapy/Biotherapy Plan Summary including diagnosis and explanation of specific treatment plan.    SIDE EFFECTS:  Common side effects were discussed with the patient and/or significant other.  Discussion included where applicable hair loss/discoloration, anemia/fatigue, infection/chills/fever, appetite, bleeding risk/precautions, constipation, diarrhea, mouth sores, taste alteration, loss of appetite, nausea/vomiting, peripheral neuropathy, skin/nail changes, rash, muscle aches/weakness, photosensitivity, weight gain/loss, hearing  loss, dizziness, menopausal symptoms, menstrual irregularity, sterility, high blood pressure, heart damage, liver damage, lung damage, kidney damage, DVT/PE risk, fluid retention, pleural/pericardial effusion, somnolence, electrolyte/LFT imbalance, vein exercises and/or the possible need for vascular access/port placement.  The patient was advised that although uncommon, leakage of an infused medication from the vein or venous access device may lead to skin breakdown and/or other tissue damage.  The patient was advised that he/she may have pain, bleeding, and/or bruising from the insertion of a needle in their vein or venous access device (port).  The patient was further advised that, in spite of proper technique, infection with redness and irritation may rarely occur at the site where the needle was inserted.  The patient was advised that if complications occur, additional medical treatment is available.  Finally, where applicable we have reviewed rare but potential immune mediated side effects including shortness of breath, cough, chest pain (pneumonitis), abdominal pain, diarrhea (colitis), thyroiditis (hypothyroid or hyperthyroid), hepatitis and liver dysfunction, nephritis and renal dysfunction.    Discussion also included side effects specific to drugs in the treatment plan, specifically:    Treatment Plans       Name Type Plan Dates Plan Provider         Active    OP PANCREATIC mFOLFIRINOX (OXALIplatin / Leucovorin / Irinotecan / Fluorouracil CIV) ONCOLOGY TREATMENT  1/10/2024 - Present Ashkan Boyer MD                      Questions answered and additional information discussed on topics including:  Anemia, Thrombocytopenia, Neutropenia, Nutrition and appetite changes, Constipation, Diarrhea, Nausea & vomiting, Mouth sores, Alopecia, Nervous system changes, Pain, Skin & nail changes, Organ toxicities, and Home care       Assessment and Plan:    Diagnoses and all orders for this visit:    1. Malignant neoplasm  of pancreas, unspecified location of malignancy (Primary)      No orders of the defined types were placed in this encounter.        The patient and I have reviewed their diagnosis and scheduled treatment plan. Needs assessment was completed where applicable including genetics, psychosocial needs, barriers to care, VAD evaluation, advanced care planning, survivorship, and palliative care services where indicated. Referrals have been ordered as appropriate based upon evaluation today and patient desires.   Chemo/biotherapy teaching was completed today and consent obtained. See separate documentation for further details.  Adequate time was given to answer questions.  Patient made aware of their care team members and contact information if they have questions or problems throughout the treatment course.  Discussion held and written information provided describing frequency of office visits and ongoing monitoring throughout the treatment plan.     Reviewed with patient any prescribed medication sent to pharmacy.  Education provided regarding proper storage, safe handling, and proper disposal of unused medication.  Proper handling of body fluids and waste discussed and written information provided.  If appropriate, patient had pretreatment labs drawn today.    Learning assessment completed at initial patient encounter. See separate flowsheet. Chemo/biotherapy education comprehension assessed at today's visit.    I spent 60 minutes caring for Eveline on this date of service. This time includes time spent by me in the following activities: preparing for the visit, reviewing tests, obtaining and/or reviewing a separately obtained history, performing a medically appropriate examination and/or evaluation, counseling and educating the patient/family/caregiver, ordering medications, tests, or procedures, referring and communicating with other health care professionals, documenting information in the medical record, and care  coordination.     Bouchra Mayen, APRN   01/11/24

## 2024-01-11 NOTE — PROGRESS NOTES
Oncology Social Work     OSW met with patient and her  in the cancer resource [place]. She is under the care of Dr. Boyer. Patient was diagnosed with metastatic pancreatic cancer OSW provided the patient with information on transportation options and cleaning for a reason information.     Clarita MCCONNELL

## 2024-01-15 ENCOUNTER — OFFICE VISIT (OUTPATIENT)
Dept: ONCOLOGY | Facility: CLINIC | Age: 69
End: 2024-01-15
Payer: MEDICARE

## 2024-01-15 ENCOUNTER — INFUSION (OUTPATIENT)
Dept: ONCOLOGY | Facility: HOSPITAL | Age: 69
End: 2024-01-15
Payer: MEDICARE

## 2024-01-15 ENCOUNTER — NUTRITION (OUTPATIENT)
Dept: OTHER | Facility: HOSPITAL | Age: 69
End: 2024-01-15
Payer: MEDICARE

## 2024-01-15 VITALS
BODY MASS INDEX: 23.1 KG/M2 | TEMPERATURE: 97.5 F | OXYGEN SATURATION: 99 % | SYSTOLIC BLOOD PRESSURE: 114 MMHG | DIASTOLIC BLOOD PRESSURE: 71 MMHG | HEART RATE: 77 BPM | RESPIRATION RATE: 18 BRPM | WEIGHT: 125.5 LBS | HEIGHT: 62 IN

## 2024-01-15 VITALS — SYSTOLIC BLOOD PRESSURE: 106 MMHG | HEART RATE: 73 BPM | DIASTOLIC BLOOD PRESSURE: 67 MMHG

## 2024-01-15 DIAGNOSIS — C25.7 MALIGNANT NEOPLASM OF OTHER PARTS OF PANCREAS: Primary | ICD-10-CM

## 2024-01-15 DIAGNOSIS — E87.6 HYPOKALEMIA: ICD-10-CM

## 2024-01-15 DIAGNOSIS — C25.7 MALIGNANT NEOPLASM OF OTHER PARTS OF PANCREAS: ICD-10-CM

## 2024-01-15 DIAGNOSIS — Z79.899 HIGH RISK MEDICATION USE: ICD-10-CM

## 2024-01-15 DIAGNOSIS — Z79.899 HIGH RISK MEDICATION USE: Primary | ICD-10-CM

## 2024-01-15 LAB
ALBUMIN SERPL-MCNC: 3.8 G/DL (ref 3.5–5.2)
ALBUMIN/GLOB SERPL: 1.1 G/DL
ALP SERPL-CCNC: 66 U/L (ref 39–117)
ALT SERPL W P-5'-P-CCNC: 15 U/L (ref 1–33)
ANION GAP SERPL CALCULATED.3IONS-SCNC: 12.2 MMOL/L (ref 5–15)
AST SERPL-CCNC: 31 U/L (ref 1–32)
BASOPHILS # BLD AUTO: 0.02 10*3/MM3 (ref 0–0.2)
BASOPHILS NFR BLD AUTO: 0.5 % (ref 0–1.5)
BILIRUB SERPL-MCNC: 0.4 MG/DL (ref 0–1.2)
BUN SERPL-MCNC: 20 MG/DL (ref 8–23)
BUN/CREAT SERPL: 22.7 (ref 7–25)
CALCIUM SPEC-SCNC: 9.6 MG/DL (ref 8.6–10.5)
CHLORIDE SERPL-SCNC: 98 MMOL/L (ref 98–107)
CO2 SERPL-SCNC: 29.8 MMOL/L (ref 22–29)
CREAT SERPL-MCNC: 0.88 MG/DL (ref 0.57–1)
DEPRECATED RDW RBC AUTO: 37.8 FL (ref 37–54)
EGFRCR SERPLBLD CKD-EPI 2021: 71.2 ML/MIN/1.73
EOSINOPHIL # BLD AUTO: 0.04 10*3/MM3 (ref 0–0.4)
EOSINOPHIL NFR BLD AUTO: 1 % (ref 0.3–6.2)
ERYTHROCYTE [DISTWIDTH] IN BLOOD BY AUTOMATED COUNT: 11.9 % (ref 12.3–15.4)
GLOBULIN UR ELPH-MCNC: 3.4 GM/DL
GLUCOSE BLDC-MCNC: 277 MG/DL (ref 74–124)
GLUCOSE SERPL-MCNC: 146 MG/DL (ref 65–99)
HCT VFR BLD AUTO: 33.1 % (ref 34–46.6)
HGB BLD-MCNC: 11.1 G/DL (ref 12–15.9)
IMM GRANULOCYTES # BLD AUTO: 0.01 10*3/MM3 (ref 0–0.05)
IMM GRANULOCYTES NFR BLD AUTO: 0.3 % (ref 0–0.5)
LYMPHOCYTES # BLD AUTO: 1.12 10*3/MM3 (ref 0.7–3.1)
LYMPHOCYTES NFR BLD AUTO: 28.2 % (ref 19.6–45.3)
Lab: ABNORMAL
MAGNESIUM SERPL-MCNC: 1.7 MG/DL (ref 1.6–2.4)
MCH RBC QN AUTO: 29.1 PG (ref 26.6–33)
MCHC RBC AUTO-ENTMCNC: 33.5 G/DL (ref 31.5–35.7)
MCV RBC AUTO: 86.6 FL (ref 79–97)
MONOCYTES # BLD AUTO: 0.5 10*3/MM3 (ref 0.1–0.9)
MONOCYTES NFR BLD AUTO: 12.6 % (ref 5–12)
NEUTROPHILS NFR BLD AUTO: 2.28 10*3/MM3 (ref 1.7–7)
NEUTROPHILS NFR BLD AUTO: 57.4 % (ref 42.7–76)
NRBC BLD AUTO-RTO: 0 /100 WBC (ref 0–0.2)
PLATELET # BLD AUTO: 184 10*3/MM3 (ref 140–450)
PMV BLD AUTO: 11 FL (ref 6–12)
POTASSIUM SERPL-SCNC: 3 MMOL/L (ref 3.5–5.2)
PROT SERPL-MCNC: 7.2 G/DL (ref 6–8.5)
RBC # BLD AUTO: 3.82 10*6/MM3 (ref 3.77–5.28)
SODIUM SERPL-SCNC: 140 MMOL/L (ref 136–145)
WBC NRBC COR # BLD AUTO: 3.97 10*3/MM3 (ref 3.4–10.8)

## 2024-01-15 PROCEDURE — 80053 COMPREHEN METABOLIC PANEL: CPT

## 2024-01-15 PROCEDURE — 96413 CHEMO IV INFUSION 1 HR: CPT

## 2024-01-15 PROCEDURE — 96367 TX/PROPH/DG ADDL SEQ IV INF: CPT

## 2024-01-15 PROCEDURE — 96416 CHEMO PROLONG INFUSE W/PUMP: CPT

## 2024-01-15 PROCEDURE — 25010000002 ATROPINE SULFATE 0.4 MG/ML SOLUTION: Performed by: INTERNAL MEDICINE

## 2024-01-15 PROCEDURE — 96368 THER/DIAG CONCURRENT INF: CPT

## 2024-01-15 PROCEDURE — 82948 REAGENT STRIP/BLOOD GLUCOSE: CPT

## 2024-01-15 PROCEDURE — 25010000002 IRINOTECAN PER 20 MG: Performed by: INTERNAL MEDICINE

## 2024-01-15 PROCEDURE — 25810000003 SODIUM CHLORIDE 0.9 % SOLUTION 250 ML FLEX CONT: Performed by: INTERNAL MEDICINE

## 2024-01-15 PROCEDURE — 83735 ASSAY OF MAGNESIUM: CPT | Performed by: NURSE PRACTITIONER

## 2024-01-15 PROCEDURE — 0 DEXTROSE 5 % SOLUTION 500 ML FLEX CONT: Performed by: INTERNAL MEDICINE

## 2024-01-15 PROCEDURE — 25010000002 PALONOSETRON PER 25 MCG: Performed by: INTERNAL MEDICINE

## 2024-01-15 PROCEDURE — G0498 CHEMO EXTEND IV INFUS W/PUMP: HCPCS

## 2024-01-15 PROCEDURE — 25010000002 OXALIPLATIN PER 0.5 MG: Performed by: INTERNAL MEDICINE

## 2024-01-15 PROCEDURE — 96417 CHEMO IV INFUS EACH ADDL SEQ: CPT

## 2024-01-15 PROCEDURE — 25010000002 DEXAMETHASONE SODIUM PHOSPHATE 100 MG/10ML SOLUTION: Performed by: INTERNAL MEDICINE

## 2024-01-15 PROCEDURE — 25010000002 FOSAPREPITANT PER 1 MG: Performed by: INTERNAL MEDICINE

## 2024-01-15 PROCEDURE — 0 DEXTROSE 5 % SOLUTION: Performed by: INTERNAL MEDICINE

## 2024-01-15 PROCEDURE — 85025 COMPLETE CBC W/AUTO DIFF WBC: CPT

## 2024-01-15 PROCEDURE — 96415 CHEMO IV INFUSION ADDL HR: CPT

## 2024-01-15 PROCEDURE — 25010000002 FLUOROURACIL PER 500 MG: Performed by: INTERNAL MEDICINE

## 2024-01-15 PROCEDURE — 96375 TX/PRO/DX INJ NEW DRUG ADDON: CPT

## 2024-01-15 PROCEDURE — 25010000002 LEUCOVORIN 500 MG RECONSTITUTED SOLUTION 1 EACH VIAL: Performed by: INTERNAL MEDICINE

## 2024-01-15 PROCEDURE — 0 DEXTROSE 5 % SOLUTION 250 ML FLEX CONT: Performed by: INTERNAL MEDICINE

## 2024-01-15 RX ORDER — FAMOTIDINE 10 MG/ML
20 INJECTION, SOLUTION INTRAVENOUS AS NEEDED
Status: COMPLETED | OUTPATIENT
Start: 2024-01-15 | End: 2024-01-15

## 2024-01-15 RX ORDER — ATROPINE SULFATE 0.4 MG/ML
0.25 INJECTION, SOLUTION INTRAVENOUS
Status: DISCONTINUED | OUTPATIENT
Start: 2024-01-15 | End: 2024-01-15 | Stop reason: HOSPADM

## 2024-01-15 RX ORDER — POTASSIUM CHLORIDE 20 MEQ/1
40 TABLET, EXTENDED RELEASE ORAL ONCE
Status: COMPLETED | OUTPATIENT
Start: 2024-01-15 | End: 2024-01-15

## 2024-01-15 RX ORDER — PALONOSETRON 0.05 MG/ML
0.25 INJECTION, SOLUTION INTRAVENOUS ONCE
Status: COMPLETED | OUTPATIENT
Start: 2024-01-15 | End: 2024-01-15

## 2024-01-15 RX ORDER — DEXTROSE MONOHYDRATE 50 MG/ML
20 INJECTION, SOLUTION INTRAVENOUS ONCE
Status: COMPLETED | OUTPATIENT
Start: 2024-01-15 | End: 2024-01-15

## 2024-01-15 RX ADMIN — FOSAPREPITANT 100 ML: 150 INJECTION, POWDER, LYOPHILIZED, FOR SOLUTION INTRAVENOUS at 09:01

## 2024-01-15 RX ADMIN — DEXAMETHASONE SODIUM PHOSPHATE 12 MG: 10 INJECTION, SOLUTION INTRAMUSCULAR; INTRAVENOUS at 09:36

## 2024-01-15 RX ADMIN — POTASSIUM CHLORIDE 40 MEQ: 1500 TABLET, EXTENDED RELEASE ORAL at 11:51

## 2024-01-15 RX ADMIN — FLUOROURACIL 3770 MG: 50 INJECTION, SOLUTION INTRAVENOUS at 15:44

## 2024-01-15 RX ADMIN — DEXTROSE MONOHYDRATE 20 ML/HR: 50 INJECTION, SOLUTION INTRAVENOUS at 09:01

## 2024-01-15 RX ADMIN — OXALIPLATIN 135 MG: 5 INJECTION, SOLUTION INTRAVENOUS at 09:53

## 2024-01-15 RX ADMIN — FAMOTIDINE 20 MG: 10 INJECTION INTRAVENOUS at 15:29

## 2024-01-15 RX ADMIN — LEUCOVORIN CALCIUM 630 MG: 500 INJECTION, POWDER, LYOPHILIZED, FOR SOLUTION INTRAMUSCULAR; INTRAVENOUS at 11:52

## 2024-01-15 RX ADMIN — DEXTROSE MONOHYDRATE 235 MG: 5 INJECTION, SOLUTION INTRAVENOUS at 12:28

## 2024-01-15 RX ADMIN — PALONOSETRON HYDROCHLORIDE 0.25 MG: 0.25 INJECTION INTRAVENOUS at 09:01

## 2024-01-15 RX ADMIN — ATROPINE SULFATE 0.25 MG: 0.4 INJECTION, SOLUTION INTRAVENOUS at 12:27

## 2024-01-15 NOTE — PROGRESS NOTES
"OUTPATIENT ONCOLOGY NUTRITION ASSESSMENT    Patient Name: Eveline Govea  YOB: 1955  MRN: [id number]  Assessment Date: 1/15/2024    COMMENTS: Met patient and spouse in the infusion area today. Begins FOLFIRINOX for newly diagnosed pancreatic cancer with carcinomatosis.   The patient has lost about 12 lb over the the last few months. Experiencing decreased appetite and taste changes.   Explained RD role and the importance of adequate nutrition and hydration during treatment.  Encouraged patient to focus on getting adequate calories, protein and nutrients in order to support immune function and nutrition needs. Reviewed examples of calorically dense high protein foods to include at meals and snacks. Discussed use of oral nutrition supplements if needed to supplement intake. Suggested small more frequent meals.   Reviewed nutrition management of possible side effects during treatment including nausea/vomiting, diarrhea, constipation, fatigue, poor appetite and taste changes. Stressed importance of good oral hygiene and provided recipe for baking soda and salt water mouth rinse to use several times a day. She is concerned about developing mouth sores and diarrhea. She currently uses a stool softener daily. She has imodium at home to use if needed. We discussed the possibility of using Enterade if needed but have not had the patient try a taste of it yet.   Provided the [employer] booklet \"Nutrition during Cancer Treatment\" as a resource as well as RD contact info for any questions. Will follow throughout treatment course and be available as needed.         Reason for Assessment Physician referral, New assessment, Education      Diagnosis/Problem   Mets pancreatic cancer with carcinomatosis.    Treatment Plan Chemotherapy FOLFIRINOX    Frequency Every14 days   Goal of cancer treatment Palliative, Disease control   Comments:      MST = 2 more Patient at risk for malnutrition  due to " "pancreatic ca diagnosis, 12 lb weight loss (8% x 3 mo), decreased po intake    Encounter Information        Nutrition/Diet History:  Decreased appetite   Oral Nutrition Supplements:    Factors/Symptoms Affecting Intake: Early satiety, Pain, Taste changes, Weight loss   Comments:      Medical/Surgical History Past Medical History:   Diagnosis Date    Arthritis     H/O mammogram 09/01/2015    Dr Lizzy Gary    Hypertension     Lupus     Pancreatic cancer 2023       Past Surgical History:   Procedure Laterality Date    COLONOSCOPY  11/2014    Dr Rodgers    FOOT SURGERY  01/01/1990    Dr Urbina    HYSTERECTOMY  1999    Dr Amin    TOE SURGERY Bilateral     TUBAL ABDOMINAL LIGATION  1984    Dr Collins            Anthropometrics        Current Height Ht Readings from Last 1 Encounters:   01/15/24 157.5 cm (62.01\")      Current Weight Wt Readings from Last 1 Encounters:   01/15/24 56.9 kg (125 lb 8 oz)      BMI  22.8   Ideal Body Weight (IBW) 110 lb   Usual Body Weight (UBW) 138 lb   Weight Change/Trend Loss, Amount/Timeframe: 8% in 3 months   Weight History Wt Readings from Last 30 Encounters:   01/15/24 0815 56.9 kg (125 lb 8 oz)   01/11/24 1315 56.5 kg (124 lb 9.6 oz)   01/04/24 1027 57.1 kg (125 lb 14.4 oz)   12/14/23 0915 59.4 kg (130 lb 14.4 oz)   10/12/16 1131 62.6 kg (138 lb)   08/15/16 1632 63 kg (139 lb)   11/03/14 1714 65.3 kg (144 lb 0.1 oz)          Medications           Current medications: Biotin, Calcium, Diclofenac Sodium, amLODIPine, aspirin, atenolol-chlorthalidone, betamethasone dipropionate, cycloSPORINE, fish oil, lidocaine-prilocaine, loratadine, lovastatin, ondansetron, phenazopyridine, potassium chloride, and sulfamethoxazole-trimethoprim                 Tests/Procedures        Tests/Procedures CT, PET     Labs       Pertinent Labs    Results from last 7 days   Lab Units 01/15/24  0759   SODIUM mmol/L 140   POTASSIUM mmol/L 3.0*   CHLORIDE mmol/L 98   CO2 mmol/L 29.8*   BUN mg/dL 20   CREATININE " "mg/dL 0.88   CALCIUM mg/dL 9.6   BILIRUBIN mg/dL 0.4   ALK PHOS U/L 66   ALT (SGPT) U/L 15   AST (SGOT) U/L 31   GLUCOSE mg/dL 146*     Results from last 7 days   Lab Units 01/15/24  0759   HEMOGLOBIN g/dL 11.1*   HEMATOCRIT % 33.1*   WBC 10*3/mm3 3.97   ALBUMIN g/dL 3.8     Results from last 7 days   Lab Units 01/15/24  0759   PLATELETS 10*3/mm3 184     No results found for: \"COVID19\"  Lab Results   Component Value Date    HGBA1C 6.4 (H) 10/02/2023          Physical Findings        Physical Appearance alert     Edema  no edema   Gastrointestinal None   Tubes/Drains implantable port   Oral/Mouth Cavity WNL   Skin        Estimated/Assessed Needs        Energy Requirements    Height for Calculation      Weight for Calculation 57 kg   Method for Estimation  30 kcal/kg   EST Needs (kcal/day) 1710 kcals/d       Protein Requirements    Weight for Calculation 57 kg   EST Protein Needs (g/kg) 1.2 gm/kg   EST Daily Needs (g/day) 68 g/d       Fluid Requirements     Method for Estimation 30 mL/kg    Estimated Needs (mL/day) 1710 ml/d           PES STATEMENT / NUTRITION DIAGNOSIS      Nutrition Dx Problem Problem:    NutritionDiagnosisProblem: Knowledge Deficit, Unintentional Weight Loss, Altered GI Function, Inadequate Oral Intake, and Increased Nutrient Needs    Etiology:  Medical diagnosis: Pancreatic cancer  Factors affecting nutrition: Reported/Observed By, Appetite, Food Habit/Preferences, Reported GI Symptoms      Signs/Symptoms:  Information Deficit, Report of Minimal PO Intake, Unintended Weight Change, and Report/Observation    Comment:      NUTRITION INTERVENTION / PLAN OF CARE      Intervention Goal(s) Reduce/improve symptoms, Provide information, Meet estimated needs, Disease management/therapy, Tolerate PO , Increase intake, Maintain weight, and No significant weight loss         RD Intervention/Action Encouraged intake, Follow Tx progress, Recommended/ordered         Recommendations:       PO Diet Increase " calories and protein, small frequent meals      Supplements Try Ensure complete, one a day for now      Snacks       Other    --      Monitor/Evaluation PO intake, Supplement intake, Pertinent labs, Weight, Symptoms   Education Education provided   --    RD to follow     Electronically signed by:  Cecilia Logan RD, LD  01/15/24 10:31 EST

## 2024-01-15 NOTE — PROGRESS NOTES
"[facility] OUTPATIENT FOLLOW UP CLINIC VISIT    REASON FOR FOLLOW-UP:    Metastatic pancreas cancer with carcinomatosis    HISTORY OF PRESENT ILLNESS:  Eveline Govea is a 68 y.o. female with the above mentioned history here today for lab review and evaluation due to start her first cycle of FOLFIRINOX.  She has had her education and is ready to proceed.   She does report some issues with abdominal bloating/distention which does make it difficult for her to eat at times.  She reports her bowels are functioning normally.  She does have some occasional nausea and feels like she could vomit but does not usual.  She did try a boost/Ensure last week.      REVIEW OF SYSTEMS:  As per the [facility] [place]    PHYSICAL EXAMINATION:  Vitals:    01/15/24 0815   BP: 114/71   Pulse: 77   Resp: 18   Temp: 97.5 °F (36.4 °C)   TempSrc: Temporal   SpO2: 99%   Weight: 56.9 kg (125 lb 8 oz)   Height: 157.5 cm (62.01\")   PainSc: 0-No pain  Comment: more pain at night when sleeping   PainLoc: Abdomen         GENERAL:  Well-developed well-nourished female; awake, alert and oriented, in no acute distress.  SKIN:  Warm and dry, without rashes, purpura, or petechiae.  HEAD:  Normocephalic, atraumatic.  Wearing a facemask.  EARS:  Hearing intact.  LYMPHATICS:  No cervical, supraclavicular, axillary lymphadenopathy.  CHEST:  Lungs are clear to auscultation bilaterally.  No wheezes, rales, or rhonchi.  HEART:  Regular rate; normal rhythm.  No murmurs, gallops or rubs.  ABDOMEN:  Soft, non-tender, non-distended.  Normal active bowel sounds.  No organomegaly.  EXTREMITIES:  No clubbing, cyanosis, or edema.  NEUROLOGICAL:  No focal neurologic deficits.    DIAGNOSTIC DATA:  Results for orders placed or performed in visit on 01/15/24   Comprehensive metabolic panel    Specimen: Blood   Result Value Ref Range    Glucose 146 (H) 65 - 99 mg/dL    BUN 20 8 - 23 mg/dL    Creatinine 0.88 0.57 - 1.00 mg/dL    Sodium 140 136 - 145 mmol/L    Potassium " 3.0 (L) 3.5 - 5.2 mmol/L    Chloride 98 98 - 107 mmol/L    CO2 29.8 (H) 22.0 - 29.0 mmol/L    Calcium 9.6 8.6 - 10.5 mg/dL    Total Protein 7.2 6.0 - 8.5 g/dL    Albumin 3.8 3.5 - 5.2 g/dL    ALT (SGPT) 15 1 - 33 U/L    AST (SGOT) 31 1 - 32 U/L    Alkaline Phosphatase 66 39 - 117 U/L    Total Bilirubin 0.4 0.0 - 1.2 mg/dL    Globulin 3.4 gm/dL    A/G Ratio 1.1 g/dL    BUN/Creatinine Ratio 22.7 7.0 - 25.0    Anion Gap 12.2 5.0 - 15.0 mmol/L    eGFR 71.2 >60.0 mL/min/1.73   CBC Auto Differential    Specimen: Blood   Result Value Ref Range    WBC 3.97 3.40 - 10.80 10*3/mm3    RBC 3.82 3.77 - 5.28 10*6/mm3    Hemoglobin 11.1 (L) 12.0 - 15.9 g/dL    Hematocrit 33.1 (L) 34.0 - 46.6 %    MCV 86.6 79.0 - 97.0 fL    MCH 29.1 26.6 - 33.0 pg    MCHC 33.5 31.5 - 35.7 g/dL    RDW 11.9 (L) 12.3 - 15.4 %    RDW-SD 37.8 37.0 - 54.0 fl    MPV 11.0 6.0 - 12.0 fL    Platelets 184 140 - 450 10*3/mm3    Neutrophil % 57.4 42.7 - 76.0 %    Lymphocyte % 28.2 19.6 - 45.3 %    Monocyte % 12.6 (H) 5.0 - 12.0 %    Eosinophil % 1.0 0.3 - 6.2 %    Basophil % 0.5 0.0 - 1.5 %    Immature Grans % 0.3 0.0 - 0.5 %    Neutrophils, Absolute 2.28 1.70 - 7.00 10*3/mm3    Lymphocytes, Absolute 1.12 0.70 - 3.10 10*3/mm3    Monocytes, Absolute 0.50 0.10 - 0.90 10*3/mm3    Eosinophils, Absolute 0.04 0.00 - 0.40 10*3/mm3    Basophils, Absolute 0.02 0.00 - 0.20 10*3/mm3    Immature Grans, Absolute 0.01 0.00 - 0.05 10*3/mm3    nRBC 0.0 0.0 - 0.2 /100 WBC       IMAGING:    No new imaging reviewed    ASSESSMENT:    This is a 68 y.o. 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  11/22/2023: CT abdomen and pelvis with peritoneal carcinomatosis, small volume ascites, abnormal appearance of the body and tail of the pancreas consistent with primary pancreas malignancy with extrapancreatic extension and vascular involvement, shotty retroperitoneal lymphadenopathy, bladder  wall thickening, nonocclusive thrombus of the left ovarian vein, noncalcified pulmonary nodules.  11/29/2023: Endoscopic ultrasound with biopsy of the pancreas mass showed adenocarcinoma  12/2/2023: PET CT scan with evident hypermetabolic malignancy in the pancreas body with evidence for peritoneal carcinomatosis with multiple hypermetabolic peritoneal implants, indeterminant small 3 mm right lower lobe lung nodule.  She had a port placed by Dr. Crooks with surgical oncology earlier this week.  On 12/13/2023 she saw Dr. Castillo with medical oncology at [facility].  A clinical trial may be available there.  She has been referred to genetic counseling due to an extensive family history of breast and other cancers.  She states that she had BRCA testing done a number of years ago that was negative.  It appears that Dr. Castillo is evaluating for mutations otherwise as he evaluates her for candidacy of the clinical trial.  Otherwise he discussed palliative chemotherapy options including modified FOLFIRINOX and Gemzar/Abraxane.  She is also going to see medical oncologist at the [facility] next week as well.  She is seen again on 1/4/2024 having been seen at the [facility].  She is interested in treatment here with our group.  Plan modified FOLFIRINOX with growth factor support due to chronic leukopenia/neutropenia.  Plan modified FOLFIRINOX with growth factor support every 2 weeks at the usual doses.  Oxaliplatin 85 mg/m², irinotecan 150 mg/m², 5-FU 2400 mg/m² over 46 hours.  Neulasta on day 3 if approved by insurance.  Treatment is palliative and the patient understands this.  1/15/2024 cycle 1 FOLFIRINOX      *Chronic leukopenia/neutropenia  Followed by Dr. Marshall at [facility]  ANC is normal  1/4/2024 at 2.04    *Cancer related pain: This is mild at this point we will continue to monitor.  She is not really taking any pain medication for this at this point.    *Anorexia and dysgeusia with weight  loss  We will have her see our nutritionist    *Anxiety regarding her health  We discussed potential referral to our supportive oncology program today but she prefers not to schedule this at this time.  We will continue to address this.    *Hypokalemia:  1/15/2024 potassium is 3.0.  She will be given 40 mEq p.o. potassium in the office today.  She also continues on home potassium 20 mEq daily.    PLAN:   Proceed with cycle 1 FOLFIRINOX today.  Patient will be given 40 mEq p.o. potassium in the office today and continue her home potassium at 20 mEq daily.  Return in 2 weeks for follow-up with Dr. Boyer with repeat labs reassessment and consideration of cycle 2 FOLFIRINOX.    Call/ return sooner should the patient develop any new concerns or problems.

## 2024-01-15 NOTE — NURSING NOTE
1410 at end of irinotecan patient complaining of dizziness and leg cramps. Blood pressure 112/67 Hr 72  1430 ambulated to bathroom without difficulty. Leg cramps feel better. Still dizzy. Talked to Cata Castellon NP. To continue fluids at 100 cc hour and check magnesium level.  Had patient drink an ensure.some abd cramping but not stools. Only gas.   Blood sugar 277. 1528  Pepcid 20 mg IV push given. Still feeling dizzy but better. 1550 feeling better. To car per wheelchair. Written and verbal instructions.

## 2024-01-16 ENCOUNTER — PATIENT OUTREACH (OUTPATIENT)
Dept: OTHER | Facility: HOSPITAL | Age: 69
End: 2024-01-16
Payer: MEDICARE

## 2024-01-16 NOTE — PROGRESS NOTES
Nurse Initial Navigator Assessment: Received referral from Dr. Boyer to follow patient regarding diagnosis and psychosocial support. Met patient and her  in cancer [facility]. Introduced self and explained role. Patient has diagnosis of metastatic pancreas cancer with carcinomatosis.  She has met with Dr. Boyer recently to discuss plan of care and treatment plan. She is scheduled to receive palliative treatment with FOLFIRINOX (modified dosing) with growth factor support Q 2 weeks x12 cycles. First cycle scheduled 1/15/24. Patient and spouse able to verbalize understanding at this time. No questions or concerns voiced at this time.     Patient lives with her spouse. States she has a good support system. Denies difficulty with affording medication or transportation issues. Patient is IADLs and mobility. Complains of abdominal discomfort. States her appetite is “poor”. Says she has occasional nausea but denies vomiting. Verbalized she has had some weight loss. Nutrition scheduled to see during her infusion appointment. Clarita/OSW also following. No additional concerns voiced or psychosocial needs noted at this time.   Medical/Psychiatric History   Cancer Diagnosis: metastatic pancreas cancer with carcinomatosis; palliative treatment with FOLFIRINOX (modified dosing) with growth factor support Q 2 weeks x12 cycles starting 1/15/24  Psychiatric History: N/A  Past Medications: N/A   Currently seeing Mental Health Professional: N/A  Medication/Counseling Interest? N/A  Social History  Support Community: yes  Substance Use: smoking---never; ETOH--rarely; Drugs--no    Family Psychiatric: N/A  Impactful cancer experience: mother, sisters x2, maternal cousins x2, maternal aunt and niece--breast cancer; sister---lung cancer; sister--kidney cancer; maternal grandfather---colon cancer  Plan of Care  Discussed support services offered at the [facility] and in the community. Patient does not have an advanced  directive. Provided navigation letter; Cancer Care Supportive Services; NCCN patient guidelines for pancreatic cancer; [employer] ([place]  [employer]); [employer]; [employer] and online resources. No additional needs noted at this time. Encouraged patient to call with any questions or concerns. Continue to follow.…Josy Monroy RN, Oncology Nurse Navigator

## 2024-01-17 ENCOUNTER — PATIENT OUTREACH (OUTPATIENT)
Dept: OTHER | Facility: HOSPITAL | Age: 69
End: 2024-01-17
Payer: MEDICARE

## 2024-01-17 ENCOUNTER — INFUSION (OUTPATIENT)
Dept: ONCOLOGY | Facility: HOSPITAL | Age: 69
End: 2024-01-17
Payer: MEDICARE

## 2024-01-17 ENCOUNTER — NUTRITION (OUTPATIENT)
Dept: OTHER | Facility: HOSPITAL | Age: 69
End: 2024-01-17
Payer: MEDICARE

## 2024-01-17 VITALS
HEART RATE: 75 BPM | RESPIRATION RATE: 16 BRPM | OXYGEN SATURATION: 96 % | WEIGHT: 125.2 LBS | TEMPERATURE: 97.1 F | DIASTOLIC BLOOD PRESSURE: 75 MMHG | SYSTOLIC BLOOD PRESSURE: 128 MMHG | BODY MASS INDEX: 22.89 KG/M2

## 2024-01-17 DIAGNOSIS — C25.7 MALIGNANT NEOPLASM OF OTHER PARTS OF PANCREAS: Primary | ICD-10-CM

## 2024-01-17 DIAGNOSIS — Z79.899 HIGH RISK MEDICATION USE: ICD-10-CM

## 2024-01-17 DIAGNOSIS — Z45.9 ENCOUNTER FOR MANAGEMENT OF IMPLANTED DEVICE: ICD-10-CM

## 2024-01-17 PROCEDURE — 25010000002 PEGFILGRASTIM 6 MG/0.6ML PREFILLED SYRINGE KIT: Performed by: INTERNAL MEDICINE

## 2024-01-17 PROCEDURE — 25010000002 HEPARIN LOCK FLUSH PER 10 UNITS: Performed by: INTERNAL MEDICINE

## 2024-01-17 PROCEDURE — 96377 APPLICATON ON-BODY INJECTOR: CPT

## 2024-01-17 PROCEDURE — 96361 HYDRATE IV INFUSION ADD-ON: CPT

## 2024-01-17 PROCEDURE — 25810000003 SODIUM CHLORIDE 0.9 % SOLUTION: Performed by: INTERNAL MEDICINE

## 2024-01-17 PROCEDURE — 96360 HYDRATION IV INFUSION INIT: CPT

## 2024-01-17 RX ORDER — SODIUM CHLORIDE 0.9 % (FLUSH) 0.9 %
10 SYRINGE (ML) INJECTION AS NEEDED
OUTPATIENT
Start: 2024-01-17

## 2024-01-17 RX ORDER — SODIUM CHLORIDE 9 MG/ML
1000 INJECTION, SOLUTION INTRAVENOUS CONTINUOUS
Status: DISCONTINUED | OUTPATIENT
Start: 2024-01-17 | End: 2024-01-17 | Stop reason: HOSPADM

## 2024-01-17 RX ORDER — HEPARIN SODIUM (PORCINE) LOCK FLUSH IV SOLN 100 UNIT/ML 100 UNIT/ML
500 SOLUTION INTRAVENOUS AS NEEDED
OUTPATIENT
Start: 2024-01-17

## 2024-01-17 RX ORDER — SODIUM CHLORIDE 0.9 % (FLUSH) 0.9 %
10 SYRINGE (ML) INJECTION AS NEEDED
Status: DISCONTINUED | OUTPATIENT
Start: 2024-01-17 | End: 2024-01-17 | Stop reason: HOSPADM

## 2024-01-17 RX ORDER — HEPARIN SODIUM (PORCINE) LOCK FLUSH IV SOLN 100 UNIT/ML 100 UNIT/ML
500 SOLUTION INTRAVENOUS AS NEEDED
Status: DISCONTINUED | OUTPATIENT
Start: 2024-01-17 | End: 2024-01-17 | Stop reason: HOSPADM

## 2024-01-17 RX ADMIN — SODIUM CHLORIDE 1000 ML: 9 INJECTION, SOLUTION INTRAVENOUS at 13:20

## 2024-01-17 RX ADMIN — Medication 500 UNITS: at 15:00

## 2024-01-17 RX ADMIN — Medication 10 ML: at 15:00

## 2024-01-17 RX ADMIN — PEGFILGRASTIM 6 MG: KIT SUBCUTANEOUS at 14:15

## 2024-01-17 NOTE — PROGRESS NOTES
"OUTPATIENT ONCOLOGY NUTRITION ASSESSMENT    Patient Name: Eveline Govea  YOB: 1955  MRN: [id number]  Assessment Date: 1/17/2024    COMMENTS: Follow up at [facility] today. The patient has not had much to eat over the last two days per . States everything tastes chalky.   Has done the baking soda and salt water rinse one time. Drank a chocolate Ensure. Reminded patient to treat food like medicine and to try to take bites and sips frequently throughout the day. Patient states \"I'd rather die\". Taste seems to be the only barrier right now keeping her from eating. Denies any nausea or diarrhea. Encouraged her to try the mouthwash again. Her  will continue to encourage the Ensure and plans to pick some more up from the store. Will continue to follow up.       Electronically signed by:  Cecilia Logan RD, LD  01/17/24 14:40 EST    "

## 2024-01-17 NOTE — PROGRESS NOTES
Nurse Navigator F/U Visit: Received message from Cecilia/Nutrition regarding patient comments. Cecilia reports patient states she's not eating. Cecilia instructed to treat food as if it's medicine. Cecilia then says patient replied “she would rather die”. Met with patient in outpatient infusion center to complete an assessment. Patient appears sad with flat affect. Asked about her comment “she would rather die”. States she is not suicidal but is feeling helpless/hopeless. No plans identified to harm self.  Patient says she can't and doesn't want to live like this not being able to eat. Briefly discussed interventions that Cecilia addressed to help with increasing intake. In addition to not being able to eat, patient also reports sleep disturbance and feeling anxious. Agreeable with referral to behavioral health APRN. Attempted to get patient scheduled with practitioner next week. However, patient asked for appointment to be scheduled on 1/29/24 @ 1500 after her infusion is completed. Says she will call if appointment needs to be changed to an earlier date. Message sent to Dr. Boyer to inform and update. No additional needs or concerns noted. Encouraged to call with questions or concerns. Continue to follow.……Josy Monroy RN, Oncology Nurse Navigator

## 2024-01-18 LAB
REF LAB TEST METHOD: NORMAL
REF LAB TEST RESULTS: NORMAL

## 2024-01-19 ENCOUNTER — TELEPHONE (OUTPATIENT)
Dept: ONCOLOGY | Facility: CLINIC | Age: 69
End: 2024-01-19
Payer: MEDICARE

## 2024-01-19 ENCOUNTER — APPOINTMENT (OUTPATIENT)
Dept: GENERAL RADIOLOGY | Facility: HOSPITAL | Age: 69
DRG: 641 | End: 2024-01-19
Payer: MEDICARE

## 2024-01-19 ENCOUNTER — HOSPITAL ENCOUNTER (INPATIENT)
Facility: HOSPITAL | Age: 69
LOS: 7 days | Discharge: HOSPICE | DRG: 641 | End: 2024-01-27
Attending: EMERGENCY MEDICINE | Admitting: INTERNAL MEDICINE
Payer: MEDICARE

## 2024-01-19 ENCOUNTER — TELEPHONE (OUTPATIENT)
Dept: OTHER | Facility: HOSPITAL | Age: 69
End: 2024-01-19
Payer: MEDICARE

## 2024-01-19 DIAGNOSIS — C25.7 MALIGNANT NEOPLASM OF OTHER PARTS OF PANCREAS: ICD-10-CM

## 2024-01-19 DIAGNOSIS — D72.829 LEUKOCYTOSIS, UNSPECIFIED TYPE: ICD-10-CM

## 2024-01-19 DIAGNOSIS — E87.6 HYPOKALEMIA: Primary | ICD-10-CM

## 2024-01-19 DIAGNOSIS — R53.83 CHEMOTHERAPY-INDUCED FATIGUE: ICD-10-CM

## 2024-01-19 DIAGNOSIS — T45.1X5A CHEMOTHERAPY-INDUCED FATIGUE: ICD-10-CM

## 2024-01-19 LAB
ALBUMIN SERPL-MCNC: 3.9 G/DL (ref 3.5–5.2)
ALBUMIN/GLOB SERPL: 1.3 G/DL
ALP SERPL-CCNC: 78 U/L (ref 39–117)
ALT SERPL W P-5'-P-CCNC: 23 U/L (ref 1–33)
ANION GAP SERPL CALCULATED.3IONS-SCNC: 17 MMOL/L (ref 5–15)
ANISOCYTOSIS BLD QL: ABNORMAL
AST SERPL-CCNC: 27 U/L (ref 1–32)
B PARAPERT DNA SPEC QL NAA+PROBE: NOT DETECTED
B PERT DNA SPEC QL NAA+PROBE: NOT DETECTED
BACTERIA UR QL AUTO: ABNORMAL /HPF
BILIRUB SERPL-MCNC: 0.4 MG/DL (ref 0–1.2)
BILIRUB UR QL STRIP: NEGATIVE
BUN SERPL-MCNC: 16 MG/DL (ref 8–23)
BUN/CREAT SERPL: 20.5 (ref 7–25)
C PNEUM DNA NPH QL NAA+NON-PROBE: NOT DETECTED
CALCIUM SPEC-SCNC: 9.4 MG/DL (ref 8.6–10.5)
CHLORIDE SERPL-SCNC: 92 MMOL/L (ref 98–107)
CLARITY UR: CLEAR
CO2 SERPL-SCNC: 24 MMOL/L (ref 22–29)
COLOR UR: YELLOW
CREAT SERPL-MCNC: 0.78 MG/DL (ref 0.57–1)
DEPRECATED RDW RBC AUTO: 35 FL (ref 37–54)
EGFRCR SERPLBLD CKD-EPI 2021: 82.3 ML/MIN/1.73
ERYTHROCYTE [DISTWIDTH] IN BLOOD BY AUTOMATED COUNT: 11.5 % (ref 12.3–15.4)
FLUAV SUBTYP SPEC NAA+PROBE: NOT DETECTED
FLUBV RNA ISLT QL NAA+PROBE: NOT DETECTED
GLOBULIN UR ELPH-MCNC: 2.9 GM/DL
GLUCOSE SERPL-MCNC: 139 MG/DL (ref 65–99)
GLUCOSE UR STRIP-MCNC: NEGATIVE MG/DL
HADV DNA SPEC NAA+PROBE: NOT DETECTED
HCOV 229E RNA SPEC QL NAA+PROBE: NOT DETECTED
HCOV HKU1 RNA SPEC QL NAA+PROBE: NOT DETECTED
HCOV NL63 RNA SPEC QL NAA+PROBE: NOT DETECTED
HCOV OC43 RNA SPEC QL NAA+PROBE: NOT DETECTED
HCT VFR BLD AUTO: 34.1 % (ref 34–46.6)
HGB BLD-MCNC: 11.5 G/DL (ref 12–15.9)
HGB UR QL STRIP.AUTO: NEGATIVE
HMPV RNA NPH QL NAA+NON-PROBE: NOT DETECTED
HPIV1 RNA ISLT QL NAA+PROBE: NOT DETECTED
HPIV2 RNA SPEC QL NAA+PROBE: NOT DETECTED
HPIV3 RNA NPH QL NAA+PROBE: NOT DETECTED
HPIV4 P GENE NPH QL NAA+PROBE: NOT DETECTED
HYALINE CASTS UR QL AUTO: ABNORMAL /LPF
KETONES UR QL STRIP: ABNORMAL
LEUKOCYTE ESTERASE UR QL STRIP.AUTO: ABNORMAL
LYMPHOCYTES # BLD MANUAL: 0.31 10*3/MM3 (ref 0.7–3.1)
M PNEUMO IGG SER IA-ACNC: NOT DETECTED
MAGNESIUM SERPL-MCNC: 1.5 MG/DL (ref 1.6–2.4)
MCH RBC QN AUTO: 28.8 PG (ref 26.6–33)
MCHC RBC AUTO-ENTMCNC: 33.7 G/DL (ref 31.5–35.7)
MCV RBC AUTO: 85.3 FL (ref 79–97)
MICROCYTES BLD QL: ABNORMAL
NEUTROPHILS # BLD AUTO: 14.54 10*3/MM3 (ref 1.7–7)
NEUTROPHILS NFR BLD MANUAL: 97.9 % (ref 42.7–76)
NITRITE UR QL STRIP: NEGATIVE
OVALOCYTES BLD QL SMEAR: ABNORMAL
PH UR STRIP.AUTO: 6 [PH] (ref 5–8)
PLAT MORPH BLD: NORMAL
PLATELET # BLD AUTO: 142 10*3/MM3 (ref 140–450)
PMV BLD AUTO: 11.2 FL (ref 6–12)
POIKILOCYTOSIS BLD QL SMEAR: ABNORMAL
POTASSIUM SERPL-SCNC: 2.8 MMOL/L (ref 3.5–5.2)
PROT SERPL-MCNC: 6.8 G/DL (ref 6–8.5)
PROT UR QL STRIP: ABNORMAL
RBC # BLD AUTO: 4 10*6/MM3 (ref 3.77–5.28)
RBC # UR STRIP: ABNORMAL /HPF
REF LAB TEST METHOD: ABNORMAL
RHINOVIRUS RNA SPEC NAA+PROBE: NOT DETECTED
RSV RNA NPH QL NAA+NON-PROBE: NOT DETECTED
SARS-COV-2 RNA NPH QL NAA+NON-PROBE: NOT DETECTED
SODIUM SERPL-SCNC: 133 MMOL/L (ref 136–145)
SP GR UR STRIP: 1.02 (ref 1–1.03)
SQUAMOUS #/AREA URNS HPF: ABNORMAL /HPF
UROBILINOGEN UR QL STRIP: ABNORMAL
VARIANT LYMPHS NFR BLD MANUAL: 2.1 % (ref 19.6–45.3)
WBC # UR STRIP: ABNORMAL /HPF
WBC MORPH BLD: NORMAL
WBC NRBC COR # BLD AUTO: 14.85 10*3/MM3 (ref 3.4–10.8)

## 2024-01-19 PROCEDURE — 0202U NFCT DS 22 TRGT SARS-COV-2: CPT | Performed by: PHYSICIAN ASSISTANT

## 2024-01-19 PROCEDURE — G0378 HOSPITAL OBSERVATION PER HR: HCPCS

## 2024-01-19 PROCEDURE — 71045 X-RAY EXAM CHEST 1 VIEW: CPT

## 2024-01-19 PROCEDURE — 36415 COLL VENOUS BLD VENIPUNCTURE: CPT

## 2024-01-19 PROCEDURE — 93005 ELECTROCARDIOGRAM TRACING: CPT | Performed by: PHYSICIAN ASSISTANT

## 2024-01-19 PROCEDURE — 83735 ASSAY OF MAGNESIUM: CPT | Performed by: INTERNAL MEDICINE

## 2024-01-19 PROCEDURE — 25010000002 POTASSIUM CHLORIDE 10 MEQ/100ML SOLUTION: Performed by: PHYSICIAN ASSISTANT

## 2024-01-19 PROCEDURE — 25010000002 SODIUM CHLORIDE 0.9 % WITH KCL 20 MEQ 20-0.9 MEQ/L-% SOLUTION: Performed by: INTERNAL MEDICINE

## 2024-01-19 PROCEDURE — 93010 ELECTROCARDIOGRAM REPORT: CPT | Performed by: INTERNAL MEDICINE

## 2024-01-19 PROCEDURE — 85025 COMPLETE CBC W/AUTO DIFF WBC: CPT | Performed by: PHYSICIAN ASSISTANT

## 2024-01-19 PROCEDURE — 25010000002 ONDANSETRON PER 1 MG: Performed by: PHYSICIAN ASSISTANT

## 2024-01-19 PROCEDURE — 99285 EMERGENCY DEPT VISIT HI MDM: CPT

## 2024-01-19 PROCEDURE — 80053 COMPREHEN METABOLIC PANEL: CPT | Performed by: PHYSICIAN ASSISTANT

## 2024-01-19 PROCEDURE — 81001 URINALYSIS AUTO W/SCOPE: CPT | Performed by: PHYSICIAN ASSISTANT

## 2024-01-19 PROCEDURE — 25010000002 MAGNESIUM SULFATE 2 GM/50ML SOLUTION: Performed by: INTERNAL MEDICINE

## 2024-01-19 PROCEDURE — 85007 BL SMEAR W/DIFF WBC COUNT: CPT | Performed by: PHYSICIAN ASSISTANT

## 2024-01-19 PROCEDURE — 25810000003 SODIUM CHLORIDE 0.9 % SOLUTION: Performed by: PHYSICIAN ASSISTANT

## 2024-01-19 RX ORDER — ATORVASTATIN CALCIUM 20 MG/1
10 TABLET, FILM COATED ORAL DAILY
Status: DISCONTINUED | OUTPATIENT
Start: 2024-01-20 | End: 2024-01-27 | Stop reason: HOSPADM

## 2024-01-19 RX ORDER — CYCLOSPORINE 0.5 MG/ML
1 EMULSION OPHTHALMIC 2 TIMES DAILY
Status: DISCONTINUED | OUTPATIENT
Start: 2024-01-20 | End: 2024-01-27 | Stop reason: HOSPADM

## 2024-01-19 RX ORDER — MORPHINE SULFATE 2 MG/ML
4 INJECTION, SOLUTION INTRAMUSCULAR; INTRAVENOUS EVERY 4 HOURS PRN
Status: DISCONTINUED | OUTPATIENT
Start: 2024-01-19 | End: 2024-01-24 | Stop reason: SDUPTHER

## 2024-01-19 RX ORDER — SODIUM CHLORIDE 0.9 % (FLUSH) 0.9 %
10 SYRINGE (ML) INJECTION AS NEEDED
Status: DISCONTINUED | OUTPATIENT
Start: 2024-01-19 | End: 2024-01-27 | Stop reason: HOSPADM

## 2024-01-19 RX ORDER — ONDANSETRON 2 MG/ML
4 INJECTION INTRAMUSCULAR; INTRAVENOUS EVERY 6 HOURS PRN
Status: DISCONTINUED | OUTPATIENT
Start: 2024-01-19 | End: 2024-01-27 | Stop reason: HOSPADM

## 2024-01-19 RX ORDER — LIDOCAINE 40 MG/G
CREAM TOPICAL AS NEEDED
Status: DISCONTINUED | OUTPATIENT
Start: 2024-01-19 | End: 2024-01-27 | Stop reason: HOSPADM

## 2024-01-19 RX ORDER — ONDANSETRON 4 MG/1
4 TABLET, ORALLY DISINTEGRATING ORAL EVERY 6 HOURS PRN
Status: DISCONTINUED | OUTPATIENT
Start: 2024-01-19 | End: 2024-01-27 | Stop reason: HOSPADM

## 2024-01-19 RX ORDER — CETIRIZINE HYDROCHLORIDE 10 MG/1
10 TABLET ORAL DAILY
Status: DISCONTINUED | OUTPATIENT
Start: 2024-01-20 | End: 2024-01-27 | Stop reason: HOSPADM

## 2024-01-19 RX ORDER — CHLORTHALIDONE 25 MG/1
25 TABLET ORAL
Status: DISCONTINUED | OUTPATIENT
Start: 2024-01-20 | End: 2024-01-20

## 2024-01-19 RX ORDER — POTASSIUM CHLORIDE 7.45 MG/ML
10 INJECTION INTRAVENOUS ONCE
Status: COMPLETED | OUTPATIENT
Start: 2024-01-19 | End: 2024-01-20

## 2024-01-19 RX ORDER — ASPIRIN 81 MG/1
81 TABLET ORAL DAILY
Status: DISCONTINUED | OUTPATIENT
Start: 2024-01-20 | End: 2024-01-27 | Stop reason: HOSPADM

## 2024-01-19 RX ORDER — POTASSIUM CHLORIDE 7.45 MG/ML
10 INJECTION INTRAVENOUS ONCE
Status: COMPLETED | OUTPATIENT
Start: 2024-01-19 | End: 2024-01-19

## 2024-01-19 RX ORDER — POTASSIUM CHLORIDE 750 MG/1
20 TABLET, FILM COATED, EXTENDED RELEASE ORAL DAILY
Status: DISCONTINUED | OUTPATIENT
Start: 2024-01-20 | End: 2024-01-27 | Stop reason: HOSPADM

## 2024-01-19 RX ORDER — POTASSIUM CHLORIDE 1.5 G/1.58G
40 POWDER, FOR SOLUTION ORAL ONCE
Status: COMPLETED | OUTPATIENT
Start: 2024-01-19 | End: 2024-01-19

## 2024-01-19 RX ORDER — ATENOLOL 50 MG/1
50 TABLET ORAL
Status: DISCONTINUED | OUTPATIENT
Start: 2024-01-20 | End: 2024-01-27 | Stop reason: HOSPADM

## 2024-01-19 RX ORDER — BISACODYL 10 MG
10 SUPPOSITORY, RECTAL RECTAL DAILY PRN
Status: DISCONTINUED | OUTPATIENT
Start: 2024-01-19 | End: 2024-01-27 | Stop reason: HOSPADM

## 2024-01-19 RX ORDER — BISACODYL 5 MG/1
5 TABLET, DELAYED RELEASE ORAL DAILY PRN
Status: DISCONTINUED | OUTPATIENT
Start: 2024-01-19 | End: 2024-01-27 | Stop reason: HOSPADM

## 2024-01-19 RX ORDER — SODIUM CHLORIDE AND POTASSIUM CHLORIDE 150; 900 MG/100ML; MG/100ML
100 INJECTION, SOLUTION INTRAVENOUS CONTINUOUS
Status: DISCONTINUED | OUTPATIENT
Start: 2024-01-19 | End: 2024-01-22

## 2024-01-19 RX ORDER — ONDANSETRON 2 MG/ML
4 INJECTION INTRAMUSCULAR; INTRAVENOUS ONCE
Status: COMPLETED | OUTPATIENT
Start: 2024-01-19 | End: 2024-01-19

## 2024-01-19 RX ORDER — POLYETHYLENE GLYCOL 3350 17 G/17G
17 POWDER, FOR SOLUTION ORAL DAILY PRN
Status: DISCONTINUED | OUTPATIENT
Start: 2024-01-19 | End: 2024-01-27 | Stop reason: HOSPADM

## 2024-01-19 RX ORDER — POTASSIUM CHLORIDE 750 MG/1
40 TABLET, FILM COATED, EXTENDED RELEASE ORAL ONCE
Status: DISCONTINUED | OUTPATIENT
Start: 2024-01-19 | End: 2024-01-19

## 2024-01-19 RX ORDER — AMLODIPINE BESYLATE 10 MG/1
10 TABLET ORAL DAILY
Status: DISCONTINUED | OUTPATIENT
Start: 2024-01-20 | End: 2024-01-27 | Stop reason: HOSPADM

## 2024-01-19 RX ORDER — UREA 10 %
3 LOTION (ML) TOPICAL NIGHTLY PRN
Status: DISCONTINUED | OUTPATIENT
Start: 2024-01-19 | End: 2024-01-27 | Stop reason: HOSPADM

## 2024-01-19 RX ORDER — AMOXICILLIN 250 MG
2 CAPSULE ORAL 2 TIMES DAILY
Status: DISCONTINUED | OUTPATIENT
Start: 2024-01-19 | End: 2024-01-27 | Stop reason: HOSPADM

## 2024-01-19 RX ORDER — ACETAMINOPHEN 325 MG/1
650 TABLET ORAL EVERY 4 HOURS PRN
Status: DISCONTINUED | OUTPATIENT
Start: 2024-01-19 | End: 2024-01-27 | Stop reason: HOSPADM

## 2024-01-19 RX ORDER — MAGNESIUM SULFATE HEPTAHYDRATE 40 MG/ML
2 INJECTION, SOLUTION INTRAVENOUS
Status: DISPENSED | OUTPATIENT
Start: 2024-01-19 | End: 2024-01-20

## 2024-01-19 RX ADMIN — POTASSIUM CHLORIDE 10 MEQ: 7.46 INJECTION, SOLUTION INTRAVENOUS at 19:48

## 2024-01-19 RX ADMIN — POTASSIUM CHLORIDE AND SODIUM CHLORIDE 100 ML/HR: 900; 150 INJECTION, SOLUTION INTRAVENOUS at 20:44

## 2024-01-19 RX ADMIN — ONDANSETRON HYDROCHLORIDE 4 MG: 2 INJECTION, SOLUTION INTRAMUSCULAR; INTRAVENOUS at 19:19

## 2024-01-19 RX ADMIN — SODIUM CHLORIDE 500 ML: 9 INJECTION, SOLUTION INTRAVENOUS at 17:55

## 2024-01-19 RX ADMIN — DOCUSATE SODIUM 50MG AND SENNOSIDES 8.6MG 2 TABLET: 8.6; 5 TABLET, FILM COATED ORAL at 22:26

## 2024-01-19 RX ADMIN — POTASSIUM CHLORIDE 40 MEQ: 1.5 POWDER, FOR SOLUTION ORAL at 19:59

## 2024-01-19 RX ADMIN — MAGNESIUM SULFATE HEPTAHYDRATE 2 G: 40 INJECTION, SOLUTION INTRAVENOUS at 22:26

## 2024-01-19 NOTE — ED PROVIDER NOTES
EMERGENCY DEPARTMENT ENCOUNTER      Room Number:  26/26  PCP: Rosibel Seymour MD  Independent Historians: Patient  Patient Care Team:  Rosibel Seymour MD as PCP - General (Internal Medicine)  Alhaji Butler MD as Referring Physician (Surgical Oncology)  Ashkan Boyer MD as Consulting Physician (Hematology and Oncology)  Josy Monryo, RN as Nurse Navigator (Oncology)       HPI:  Chief Complaint: Weakness    A complete HPI/ROS/PMH/PSH/SH/FH are unobtainable due to: None    Chronic or social conditions impacting patient care (Social Determinants of Health): None      Context: Eveline Govea is a 68 y.o. female with a PMH significant for pancreatic cancer, lupus who presents to the ED c/o acute weakness.  The patient received chemotherapy for pancreatic cancer on Monday and Tuesday of this week.  By Wednesday she was feeling generally fatigued and required fluid hydration intravenously in the office with her oncologist.  She returns today with continued weakness as well as decreased appetite.  The patient states that she is unable to tolerate food or liquids by mouth secondary to bad taste over the past several days.  Denies development of fever, chills, nausea, vomiting.  She was encouraged to come in for further evaluation by the Dr. Boyer's office staff.      Upon review of prior external notes (non-ED) -and- Review of prior external test results outside of this encounter it appears the patient was evaluated in the office with oncology for malignant neoplasm of the pancreas on January 15, 2023.  The patient had a normal magnesium on 1/5/2024 and a normal TSH on 10/2/2023.      PAST MEDICAL HISTORY  Active Ambulatory Problems     Diagnosis Date Noted    Hot flash, menopausal 08/15/2016    Essential hypertension 08/15/2016    SLE (systemic lupus erythematosus) 08/15/2016    Pancytopenia 08/25/2016    Malignant neoplasm of other parts of pancreas 01/04/2024    High risk medication use 01/04/2024     Encounter for management of implanted device 01/17/2024     Resolved Ambulatory Problems     Diagnosis Date Noted    Back ache 08/15/2016    Difficulty sleeping 08/15/2016    Fatigue 08/15/2016    Edema extremities 08/15/2016    Gain of weight 08/15/2016     Past Medical History:   Diagnosis Date    Arthritis     H/O mammogram 09/01/2015    Hypertension     Lupus     Pancreatic cancer 2023         PAST SURGICAL HISTORY  Past Surgical History:   Procedure Laterality Date    COLONOSCOPY  11/2014    Dr Rodgers    FOOT SURGERY  01/01/1990    Dr Urbina    HYSTERECTOMY  1999    Dr Amin    TOE SURGERY Bilateral     TUBAL ABDOMINAL LIGATION  1984    Dr Collins         FAMILY HISTORY  Family History   Problem Relation Age of Onset    Heart attack Mother     Hypertension Mother     Diabetes Mother     Cancer Mother     Breast cancer Mother     Kidney failure Mother     Heart disease Father     Heart attack Father     Hypertension Father     Diabetes Father     Hypertension Sister     Breast cancer Sister     Cancer Sister     Hypertension Sister     Breast cancer Sister          SOCIAL HISTORY  Social History     Socioeconomic History    Marital status:     Number of children: 2   Tobacco Use    Smoking status: Never   Substance and Sexual Activity    Alcohol use: No    Drug use: Never    Sexual activity: Defer         ALLERGIES  Ace inhibitors      REVIEW OF SYSTEMS  Included in HPI  All systems reviewed and negative except for those discussed in HPI.      PHYSICAL EXAM    I have reviewed the triage vital signs and nursing notes.    ED Triage Vitals   Temp Heart Rate Resp BP SpO2   01/19/24 1707 01/19/24 1707 01/19/24 1707 01/19/24 1711 01/19/24 1707   97.2 °F (36.2 °C) 99 16 147/80 99 %      Temp src Heart Rate Source Patient Position BP Location FiO2 (%)   01/19/24 1707 01/19/24 1707 -- -- --   Tympanic Monitor          Physical Exam  Constitutional:       General: She is not in acute distress.     Appearance: She  is well-developed.   HENT:      Head: Normocephalic and atraumatic.   Eyes:      General: No scleral icterus.     Conjunctiva/sclera: Conjunctivae normal.   Neck:      Trachea: No tracheal deviation.   Cardiovascular:      Rate and Rhythm: Regular rhythm.      Comments: Borderline tachycardia  Pulmonary:      Effort: Pulmonary effort is normal.      Breath sounds: Normal breath sounds.   Abdominal:      Palpations: Abdomen is soft.      Tenderness: There is no abdominal tenderness.   Musculoskeletal:         General: No deformity.      Cervical back: Normal range of motion.   Lymphadenopathy:      Cervical: No cervical adenopathy.   Skin:     General: Skin is warm and dry.   Neurological:      Mental Status: She is alert and oriented to person, place, and time.   Psychiatric:         Behavior: Behavior normal.         Vital signs and nursing notes reviewed.      PPE: I wore a surgical mask throughout my encounters with the pt. I performed hand hygiene on entry into the pt room and upon exit. Patient wore: facemask    LAB RESULTS  Recent Results (from the past 24 hour(s))   Comprehensive Metabolic Panel    Collection Time: 01/19/24  5:37 PM    Specimen: Blood   Result Value Ref Range    Glucose 139 (H) 65 - 99 mg/dL    BUN 16 8 - 23 mg/dL    Creatinine 0.78 0.57 - 1.00 mg/dL    Sodium 133 (L) 136 - 145 mmol/L    Potassium 2.8 (L) 3.5 - 5.2 mmol/L    Chloride 92 (L) 98 - 107 mmol/L    CO2 24.0 22.0 - 29.0 mmol/L    Calcium 9.4 8.6 - 10.5 mg/dL    Total Protein 6.8 6.0 - 8.5 g/dL    Albumin 3.9 3.5 - 5.2 g/dL    ALT (SGPT) 23 1 - 33 U/L    AST (SGOT) 27 1 - 32 U/L    Alkaline Phosphatase 78 39 - 117 U/L    Total Bilirubin 0.4 0.0 - 1.2 mg/dL    Globulin 2.9 gm/dL    A/G Ratio 1.3 g/dL    BUN/Creatinine Ratio 20.5 7.0 - 25.0    Anion Gap 17.0 (H) 5.0 - 15.0 mmol/L    eGFR 82.3 >60.0 mL/min/1.73   CBC Auto Differential    Collection Time: 01/19/24  5:37 PM    Specimen: Blood   Result Value Ref Range    WBC 14.85 (H) 3.40  - 10.80 10*3/mm3    RBC 4.00 3.77 - 5.28 10*6/mm3    Hemoglobin 11.5 (L) 12.0 - 15.9 g/dL    Hematocrit 34.1 34.0 - 46.6 %    MCV 85.3 79.0 - 97.0 fL    MCH 28.8 26.6 - 33.0 pg    MCHC 33.7 31.5 - 35.7 g/dL    RDW 11.5 (L) 12.3 - 15.4 %    RDW-SD 35.0 (L) 37.0 - 54.0 fl    MPV 11.2 6.0 - 12.0 fL    Platelets 142 140 - 450 10*3/mm3   Manual Differential    Collection Time: 01/19/24  5:37 PM    Specimen: Blood   Result Value Ref Range    Neutrophil % 97.9 (H) 42.7 - 76.0 %    Lymphocyte % 2.1 (L) 19.6 - 45.3 %    Neutrophils Absolute 14.54 (H) 1.70 - 7.00 10*3/mm3    Lymphocytes Absolute 0.31 (L) 0.70 - 3.10 10*3/mm3    Anisocytosis Mod/2+ None Seen    Microcytes Slight/1+ None Seen    Ovalocytes Mod/2+ None Seen    Poikilocytes Mod/2+ None Seen    WBC Morphology Normal Normal    Platelet Morphology Normal Normal   Respiratory Panel PCR w/COVID-19(SARS-CoV-2) PASCUAL/MARIZA/FIDEL/PAD/COR/EYAL In-House, NP Swab in UTM/VTM, 2 HR TAT - Swab, Nasopharynx    Collection Time: 01/19/24  5:59 PM    Specimen: Nasopharynx; Swab   Result Value Ref Range    ADENOVIRUS, PCR Not Detected Not Detected    Coronavirus 229E Not Detected Not Detected    Coronavirus HKU1 Not Detected Not Detected    Coronavirus NL63 Not Detected Not Detected    Coronavirus OC43 Not Detected Not Detected    COVID19 Not Detected Not Detected - Ref. Range    Human Metapneumovirus Not Detected Not Detected    Human Rhinovirus/Enterovirus Not Detected Not Detected    Influenza A PCR Not Detected Not Detected    Influenza B PCR Not Detected Not Detected    Parainfluenza Virus 1 Not Detected Not Detected    Parainfluenza Virus 2 Not Detected Not Detected    Parainfluenza Virus 3 Not Detected Not Detected    Parainfluenza Virus 4 Not Detected Not Detected    RSV, PCR Not Detected Not Detected    Bordetella pertussis pcr Not Detected Not Detected    Bordetella parapertussis PCR Not Detected Not Detected    Chlamydophila pneumoniae PCR Not Detected Not Detected     Mycoplasma pneumo by PCR Not Detected Not Detected   ECG 12 Lead Other; fatigue    Collection Time: 01/19/24  6:01 PM   Result Value Ref Range    QT Interval 351 ms    QTC Interval 395 ms         RADIOLOGY  XR Chest 1 View    Result Date: 1/19/2024  PORTABLE CHEST X-RAY  HISTORY: Fatigue.  Portable chest x-ray is provided. Correlation: None.  FINDINGS: Right IJ Mediport catheter. The cardiomediastinal silhouette is normal. The lungs are clear. The costophrenic sulci are dry and the bones appear normal. There is no pneumothorax.      Negative.  This report was finalized on 1/19/2024 6:02 PM by Dr. Cuauhtemoc Juarez M.D on Workstation: [id number]         MEDICATIONS GIVEN IN ER  Medications   sodium chloride 0.9 % flush 10 mL (has no administration in time range)   potassium chloride 10 mEq in 100 mL IVPB (has no administration in time range)   potassium chloride (K-DUR,KLOR-CON) ER tablet 40 mEq (has no administration in time range)   sodium chloride 0.9 % bolus 500 mL (0 mL Intravenous Stopped 1/19/24 1855)   ondansetron (ZOFRAN) injection 4 mg (4 mg Intravenous Given 1/19/24 1919)         ORDERS PLACED DURING THIS VISIT:  Orders Placed This Encounter   Procedures    Respiratory Panel PCR w/COVID-19(SARS-CoV-2) [facility]AL/MARIZA/FIDEL/PAD/COR/EYAL In-House, NP Swab in UTM/VTM, 2 HR TAT - Swab, Nasopharynx    XR Chest 1 View    Comprehensive Metabolic Panel    Urinalysis With Microscopic If Indicated (No Culture) - Urine, Clean Catch    CBC Auto Differential    Manual Differential    Hematology and Oncology (on-call MD unless specified)    ECG 12 Lead Other; fatigue    Insert Peripheral IV    Initiate Observation Status    CBC & Differential         OUTPATIENT MEDICATION MANAGEMENT:  Current Facility-Administered Medications Ordered in Epic   Medication Dose Route Frequency Provider Last Rate Last Admin    potassium chloride (K-DUR,KLOR-CON) ER tablet 40 mEq  40 mEq Oral Once Santi Valdivia PA        potassium chloride 10 mEq in  100 mL IVPB  10 mEq Intravenous Once Santi Valdivia PA        sodium chloride 0.9 % flush 10 mL  10 mL Intravenous PRN Santi Valdivia PA         Current Outpatient Medications Ordered in Epic   Medication Sig Dispense Refill    amLODIPine (NORVASC) 10 MG tablet Take 1 tablet by mouth Daily.      aspirin 81 MG tablet Take 1 tablet by mouth Daily.      atenolol-chlorthalidone (TENORETIC) 50-25 MG per tablet Take 1 tablet by mouth Daily.      betamethasone dipropionate (DIPROLENE) 0.05 % ointment Apply  topically daily as needed.      Biotin 5000 MCG tablet Take 1 tablet by mouth daily.      CALCIUM PO Take 2 tablets by mouth daily.      Diclofenac Sodium (VOLTAREN) 1 % gel gel Apply 4 g topically to the appropriate area as directed.      lidocaine-prilocaine (EMLA) 2.5-2.5 % cream APPLY NICKEL SIZE AMOUNT DIRECTLY ON PORT SITE 30-60 MINUTES PRIOR TO HAVING PORT ACCESSED. COVER WITH SARAN WRAP. 30 g 1    loratadine (CLARITIN) 10 MG tablet Take 1 tablet by mouth Daily.      lovastatin (MEVACOR) 20 MG tablet Take 1 tablet by mouth every night. 90 tablet 3    Omega-3 Fatty Acids (FISH OIL) 1000 MG capsule capsule Take 1 capsule by mouth Daily With Breakfast.      ondansetron (ZOFRAN) 8 MG tablet Take 1 tablet by mouth 3 (Three) Times a Day As Needed for Nausea or Vomiting. 30 tablet 5    phenazopyridine (PYRIDIUM) 200 MG tablet       potassium chloride (K-DUR,KLOR-CON) 20 MEQ CR tablet Take 1 tablet by mouth daily. 90 tablet 3    RESTASIS 0.05 % ophthalmic emulsion Administer 1 drop to both eyes 2 (Two) Times a Day.      sulfamethoxazole-trimethoprim (BACTRIM DS,SEPTRA DS) 800-160 MG per tablet                PROGRESS, DATA ANALYSIS, CONSULTS, AND MEDICAL DECISION MAKING  All labs have been independently interpreted by me.  All radiology studies have been reviewed by me. All EKG's have been independently viewed and interpreted by me.  Discussion below represents my analysis of pertinent findings related to patient's  condition, differential diagnosis, treatment plan and final disposition.    Patient presentation and evaluation most consistent with chemotherapy-induced fatigue with associated hypokalemia and persistent nausea that will require admission for continued supportive care and correction of electrolytes.  The recommendation for admission comes from oncology, Dr. Boyer who plans to see the patient in consult.  Patient agreeable.    DIFFERENTIAL DIAGNOSIS INCLUDE BUT NOT LIMITED TO:     Dehydration, UTI, pneumonia, electrolyte disturbance      ED Course as of 01/19/24 1933 Fri Jan 19, 2024 1818 Per my independent interpretation of the chest x-ray, there is no obvious pneumothorax. [DC]   1833 EKG ER MD interpretation   Time: 18: 01  Rhythm and rate: Normal sinus rhythm at a rate of 76  Axis: Normal  P waves: Normal  QRS complexes: Normal  ST segments: no elevation nor depressions  T waves: Flat T waves in 2, 3, aVF [AR]   1930 I discussed the case with MD kylah with oncology at this time regarding the patient.  I discussed work-up, results, concerns.  I discussed the consulting provider's desire for admission to the hospitalist service for continued hydration and supportive care.   [DC]   1931 I discussed the case with MD Ju with [facility] at this time regarding the patient.  I discussed work-up, results, concerns.  I discussed the consulting provider's desire for med surg obs admit.  [DC]      ED Course User Index  [AR] Sugar Medina MD  [DC] Santi Valdivia, PA           1934 I rechecked the patient.  I discussed the patient's labs, radiology findings (including all incidental findings), diagnosis, and plan for admission. The patient understands and agrees with the plan.      AS OF 19:33 EST VITALS:    BP - 138/70  HR - 99  TEMP - 97.2 °F (36.2 °C) (Tympanic)  O2 SATS - 100%    COMPLEXITY OF CARE  The patient requires admission.      DIAGNOSIS  Final diagnoses:   Hypokalemia   Chemotherapy-induced fatigue    Leukocytosis, unspecified type         DISPOSITION  ED Disposition       ED Disposition   Decision to Admit    Condition   --    Comment   Level of Care: Med/Surg [1]   Diagnosis: Hypokalemia [870773]   Admitting Physician: AMIRA GREEN [[id number]]   Attending Physician: AMIRA GREEN [[id number]]                  Please note that portions of this document were completed with a voice recognition program.    Note Disclaimer: At [facility], we believe that sharing information builds trust and better relationships. You are receiving this note because you recently visited [facility]. It is possible you will see health information before a provider has talked with you about it. This kind of information can be easy to misunderstand. To help you fully understand what it means for your health, we urge you to discuss this note with your provider.         Santi Valdivia PA  01/22/24 0952

## 2024-01-19 NOTE — ED PROVIDER NOTES
MD ATTESTATION NOTE    SHARED VISIT: This visit was performed by BOTH a physician and an APC. The substantive portion of the medical decision making was performed by this attesting physician who made or approved the management plan and takes responsibility for patient management. All studies in the APC note (if performed) were independently interpreted by me.     The HOMAR and I have discussed this patient's history, physical exam, and treatment plan.  I have reviewed the documentation and affirm the documentation and agree with the treatment and plan.  The attached note describes my personal findings.      Independent Historians: Patient    A complete HPI/ROS/PMH/PSH/SH/FH are unobtainable due to: None    Chronic or social conditions impacting patient care (social determinants of health): None    Eveline Govea is a 68 y.o. female with pancreatic cancer and lupus actively receiving chemotherapy as recent as Monday and Tuesday of this week who presents to the ED c/o acute weakness and fatigue.  Patient did receive IV fluids on Wednesday with oncology but despite that patient says she has decreased p.o. intake and continued weakness.  She denies any fever, chills, vomiting.  She was told to come in for further evaluation.          On exam:  GENERAL: Frail female, cooperative, alert, no acute distress  SKIN: Warm, dry  HENT: Normocephalic, atraumatic  EYES: no scleral icterus  CV: regular rhythm, regular rate  RESPIRATORY: normal effort, lungs clear  ABDOMEN: soft, nontender, nondistended  MUSCULOSKELETAL: no deformity  NEURO: alert, moves all extremities, follows commands                                                             Labs  Recent Results (from the past 24 hour(s))   Comprehensive Metabolic Panel    Collection Time: 01/19/24  5:37 PM    Specimen: Blood   Result Value Ref Range    Glucose 139 (H) 65 - 99 mg/dL    BUN 16 8 - 23 mg/dL    Creatinine 0.78 0.57 - 1.00 mg/dL    Sodium 133 (L) 136 - 145 mmol/L     Potassium 2.8 (L) 3.5 - 5.2 mmol/L    Chloride 92 (L) 98 - 107 mmol/L    CO2 24.0 22.0 - 29.0 mmol/L    Calcium 9.4 8.6 - 10.5 mg/dL    Total Protein 6.8 6.0 - 8.5 g/dL    Albumin 3.9 3.5 - 5.2 g/dL    ALT (SGPT) 23 1 - 33 U/L    AST (SGOT) 27 1 - 32 U/L    Alkaline Phosphatase 78 39 - 117 U/L    Total Bilirubin 0.4 0.0 - 1.2 mg/dL    Globulin 2.9 gm/dL    A/G Ratio 1.3 g/dL    BUN/Creatinine Ratio 20.5 7.0 - 25.0    Anion Gap 17.0 (H) 5.0 - 15.0 mmol/L    eGFR 82.3 >60.0 mL/min/1.73   CBC Auto Differential    Collection Time: 01/19/24  5:37 PM    Specimen: Blood   Result Value Ref Range    WBC 14.85 (H) 3.40 - 10.80 10*3/mm3    RBC 4.00 3.77 - 5.28 10*6/mm3    Hemoglobin 11.5 (L) 12.0 - 15.9 g/dL    Hematocrit 34.1 34.0 - 46.6 %    MCV 85.3 79.0 - 97.0 fL    MCH 28.8 26.6 - 33.0 pg    MCHC 33.7 31.5 - 35.7 g/dL    RDW 11.5 (L) 12.3 - 15.4 %    RDW-SD 35.0 (L) 37.0 - 54.0 fl    MPV 11.2 6.0 - 12.0 fL    Platelets 142 140 - 450 10*3/mm3   Manual Differential    Collection Time: 01/19/24  5:37 PM    Specimen: Blood   Result Value Ref Range    Neutrophil % 97.9 (H) 42.7 - 76.0 %    Lymphocyte % 2.1 (L) 19.6 - 45.3 %    Neutrophils Absolute 14.54 (H) 1.70 - 7.00 10*3/mm3    Lymphocytes Absolute 0.31 (L) 0.70 - 3.10 10*3/mm3    Anisocytosis Mod/2+ None Seen    Microcytes Slight/1+ None Seen    Ovalocytes Mod/2+ None Seen    Poikilocytes Mod/2+ None Seen    WBC Morphology Normal Normal    Platelet Morphology Normal Normal   Magnesium    Collection Time: 01/19/24  5:37 PM    Specimen: Blood   Result Value Ref Range    Magnesium 1.5 (L) 1.6 - 2.4 mg/dL   Respiratory Panel PCR w/COVID-19(SARS-CoV-2) PASCUAL/MARIZA/FIDEL/PAD/COR/EYAL In-House, NP Swab in UTM/VTM, 2 HR TAT - Swab, Nasopharynx    Collection Time: 01/19/24  5:59 PM    Specimen: Nasopharynx; Swab   Result Value Ref Range    ADENOVIRUS, PCR Not Detected Not Detected    Coronavirus 229E Not Detected Not Detected    Coronavirus HKU1 Not Detected Not Detected    Coronavirus  NL63 Not Detected Not Detected    Coronavirus OC43 Not Detected Not Detected    COVID19 Not Detected Not Detected - Ref. Range    Human Metapneumovirus Not Detected Not Detected    Human Rhinovirus/Enterovirus Not Detected Not Detected    Influenza A PCR Not Detected Not Detected    Influenza B PCR Not Detected Not Detected    Parainfluenza Virus 1 Not Detected Not Detected    Parainfluenza Virus 2 Not Detected Not Detected    Parainfluenza Virus 3 Not Detected Not Detected    Parainfluenza Virus 4 Not Detected Not Detected    RSV, PCR Not Detected Not Detected    Bordetella pertussis pcr Not Detected Not Detected    Bordetella parapertussis PCR Not Detected Not Detected    Chlamydophila pneumoniae PCR Not Detected Not Detected    Mycoplasma pneumo by PCR Not Detected Not Detected   ECG 12 Lead Other; fatigue    Collection Time: 01/19/24  6:01 PM   Result Value Ref Range    QT Interval 351 ms    QTC Interval 395 ms   Urinalysis With Microscopic If Indicated (No Culture) - Urine, Clean Catch    Collection Time: 01/19/24 10:43 PM    Specimen: Urine, Clean Catch   Result Value Ref Range    Color, UA Yellow Yellow, Straw    Appearance, UA Clear Clear    pH, UA 6.0 5.0 - 8.0    Specific Gravity, UA 1.021 1.005 - 1.030    Glucose, UA Negative Negative    Ketones, UA 15 mg/dL (1+) (A) Negative    Bilirubin, UA Negative Negative    Blood, UA Negative Negative    Protein, UA Trace (A) Negative    Leuk Esterase, UA Small (1+) (A) Negative    Nitrite, UA Negative Negative    Urobilinogen, UA 0.2 E.U./dL 0.2 - 1.0 E.U./dL   Urinalysis, Microscopic Only - Urine, Clean Catch    Collection Time: 01/19/24 10:43 PM    Specimen: Urine, Clean Catch   Result Value Ref Range    RBC, UA 0-2 None Seen, 0-2 /HPF    WBC, UA 11-20 (A) None Seen, 0-2 /HPF    Bacteria, UA None Seen None Seen /HPF    Squamous Epithelial Cells, UA 0-2 None Seen, 0-2 /HPF    Hyaline Casts, UA 7-12 None Seen /LPF    Methodology Automated Microscopy         Radiology  XR Chest 1 View    Result Date: 1/19/2024  PORTABLE CHEST X-RAY  HISTORY: Fatigue.  Portable chest x-ray is provided. Correlation: None.  FINDINGS: Right IJ Mediport catheter. The cardiomediastinal silhouette is normal. The lungs are clear. The costophrenic sulci are dry and the bones appear normal. There is no pneumothorax.      Negative.  This report was finalized on 1/19/2024 6:02 PM by Dr. Cuauhtemoc Juarez M.D on Workstation: [id number]       Medical Decision Making:  ED Course as of 01/19/24 2323 Fri Jan 19, 2024   1818 Per my independent interpretation of the chest x-ray, there is no obvious pneumothorax. [DC]   1833 EKG ER MD interpretation   Time: 18: 01  Rhythm and rate: Normal sinus rhythm at a rate of 76  Axis: Normal  P waves: Normal  QRS complexes: Normal  ST segments: no elevation nor depressions  T waves: Flat T waves in 2, 3, aVF [AR]   1930 I discussed the case with MD kylah with oncology at this time regarding the patient.  I discussed work-up, results, concerns.  I discussed the consulting provider's desire for admission to the hospitalist service for continued hydration and supportive care.   [DC]   1931 I discussed the case with MD Ju with [facility] at this time regarding the patient.  I discussed work-up, results, concerns.  I discussed the consulting provider's desire for med surg obs admit.  [DC]      ED Course User Index  [AR] Sugar Medina MD  [DC] Santi Valdivia PA       MDM: Patient presenting with generalized weakness and decreased p.o. intake in the setting of pancreatic cancer receiving current chemotherapy.  She had gotten IV fluids as an outpatient on Wednesday and still feels depleted and weak.  Plan to assess for any infectious etiology and electrolyte derangements with labs and viral panel.  Will administer IV fluids and likely admit.    Procedures:  Procedures        PPE: I followed hospital protocols for proper PPE based on patient presentation  including use of N95 mask for suspected infectious respiratory conditions.  Proper hand hygiene was performed both before and after the patient encounter.          Diagnosis  Final diagnoses:   Hypokalemia   Chemotherapy-induced fatigue   Leukocytosis, unspecified type       Note Disclaimer: At [facility], we believe that sharing information builds trust and better relationships. You are receiving this note because you recently visited [facility]. It is possible you will see health information before a provider has talked with you about it. This kind of information can be easy to misunderstand. To help you fully understand what it means for your health, we urge you to discuss this note with your provider.       Sugar Medina MD  01/19/24 0357

## 2024-01-19 NOTE — TELEPHONE ENCOUNTER
Pt states she is not eating or drinking at all. She is urinating 3-4 times. She does not know if its clear or concentrated. She does not have a fever. I advised her to eat and drink as much as she can. She said she can not make herself. She states she is not nauseous and stopped taking her zofran. I advised the pt to report to the ED. Pt will go to the ED. Pt V/U.

## 2024-01-19 NOTE — TELEPHONE ENCOUNTER
Oncology Social Work     OSW reached out to patient for psychosocial support. OSW spoke to patient via telephone. Patient reported that she is really struggling with some extreme fatigue and poor appetite. She reported she was feeling so bad that she thought about going to the ER but wanted to speak with Dr. Boyer's clinical team first. OSW contact Kerrie KOWALSKI related to the patient's concern.     Clarita MCCONNELL

## 2024-01-20 PROBLEM — B37.0 THRUSH: Status: ACTIVE | Noted: 2024-01-20

## 2024-01-20 LAB
ANION GAP SERPL CALCULATED.3IONS-SCNC: 16 MMOL/L (ref 5–15)
BUN SERPL-MCNC: 13 MG/DL (ref 8–23)
BUN/CREAT SERPL: 20 (ref 7–25)
CALCIUM SPEC-SCNC: 8.3 MG/DL (ref 8.6–10.5)
CHLORIDE SERPL-SCNC: 99 MMOL/L (ref 98–107)
CO2 SERPL-SCNC: 19 MMOL/L (ref 22–29)
CREAT SERPL-MCNC: 0.65 MG/DL (ref 0.57–1)
DEPRECATED RDW RBC AUTO: 38 FL (ref 37–54)
DEPRECATED RDW RBC AUTO: 38.8 FL (ref 37–54)
EGFRCR SERPLBLD CKD-EPI 2021: 95.4 ML/MIN/1.73
ERYTHROCYTE [DISTWIDTH] IN BLOOD BY AUTOMATED COUNT: 11.9 % (ref 12.3–15.4)
ERYTHROCYTE [DISTWIDTH] IN BLOOD BY AUTOMATED COUNT: 11.9 % (ref 12.3–15.4)
GLUCOSE SERPL-MCNC: 135 MG/DL (ref 65–99)
HCT VFR BLD AUTO: 29.9 % (ref 34–46.6)
HCT VFR BLD AUTO: 31.4 % (ref 34–46.6)
HGB BLD-MCNC: 10 G/DL (ref 12–15.9)
HGB BLD-MCNC: 10.2 G/DL (ref 12–15.9)
LYMPHOCYTES # BLD MANUAL: 0.27 10*3/MM3 (ref 0.7–3.1)
LYMPHOCYTES NFR BLD MANUAL: 2 % (ref 5–12)
MAGNESIUM SERPL-MCNC: 2.5 MG/DL (ref 1.6–2.4)
MCH RBC QN AUTO: 28.7 PG (ref 26.6–33)
MCH RBC QN AUTO: 28.8 PG (ref 26.6–33)
MCHC RBC AUTO-ENTMCNC: 32.5 G/DL (ref 31.5–35.7)
MCHC RBC AUTO-ENTMCNC: 33.1 G/DL (ref 31.5–35.7)
MCV RBC AUTO: 86.9 FL (ref 79–97)
MCV RBC AUTO: 88.2 FL (ref 79–97)
METAMYELOCYTES NFR BLD MANUAL: 3 % (ref 0–0)
MONOCYTES # BLD: 0.27 10*3/MM3 (ref 0.1–0.9)
MYELOCYTES NFR BLD MANUAL: 1 % (ref 0–0)
NEUTROPHILS # BLD AUTO: 12.32 10*3/MM3 (ref 1.7–7)
NEUTROPHILS NFR BLD MANUAL: 92 % (ref 42.7–76)
OVALOCYTES BLD QL SMEAR: ABNORMAL
PLAT MORPH BLD: NORMAL
PLATELET # BLD AUTO: 110 10*3/MM3 (ref 140–450)
PLATELET # BLD AUTO: 117 10*3/MM3 (ref 140–450)
PMV BLD AUTO: 11.9 FL (ref 6–12)
PMV BLD AUTO: 12.1 FL (ref 6–12)
POTASSIUM SERPL-SCNC: 3.9 MMOL/L (ref 3.5–5.2)
QT INTERVAL: 351 MS
QTC INTERVAL: 395 MS
RBC # BLD AUTO: 3.44 10*6/MM3 (ref 3.77–5.28)
RBC # BLD AUTO: 3.56 10*6/MM3 (ref 3.77–5.28)
SODIUM SERPL-SCNC: 134 MMOL/L (ref 136–145)
VARIANT LYMPHS NFR BLD MANUAL: 2 % (ref 19.6–45.3)
WBC MORPH BLD: NORMAL
WBC NRBC COR # BLD AUTO: 13.39 10*3/MM3 (ref 3.4–10.8)
WBC NRBC COR # BLD AUTO: 13.58 10*3/MM3 (ref 3.4–10.8)

## 2024-01-20 PROCEDURE — 83735 ASSAY OF MAGNESIUM: CPT | Performed by: INTERNAL MEDICINE

## 2024-01-20 PROCEDURE — 85027 COMPLETE CBC AUTOMATED: CPT | Performed by: INTERNAL MEDICINE

## 2024-01-20 PROCEDURE — 99222 1ST HOSP IP/OBS MODERATE 55: CPT | Performed by: INTERNAL MEDICINE

## 2024-01-20 PROCEDURE — 25010000002 POTASSIUM CHLORIDE 10 MEQ/100ML SOLUTION: Performed by: INTERNAL MEDICINE

## 2024-01-20 PROCEDURE — 25010000002 MAGNESIUM SULFATE 2 GM/50ML SOLUTION: Performed by: INTERNAL MEDICINE

## 2024-01-20 PROCEDURE — 25010000002 SODIUM CHLORIDE 0.9 % WITH KCL 20 MEQ 20-0.9 MEQ/L-% SOLUTION: Performed by: INTERNAL MEDICINE

## 2024-01-20 PROCEDURE — 25010000002 POTASSIUM CHLORIDE 10 MEQ/100ML SOLUTION: Performed by: HOSPITALIST

## 2024-01-20 PROCEDURE — 80048 BASIC METABOLIC PNL TOTAL CA: CPT | Performed by: INTERNAL MEDICINE

## 2024-01-20 PROCEDURE — 36415 COLL VENOUS BLD VENIPUNCTURE: CPT | Performed by: INTERNAL MEDICINE

## 2024-01-20 RX ORDER — POTASSIUM CHLORIDE 7.45 MG/ML
10 INJECTION INTRAVENOUS ONCE
Status: COMPLETED | OUTPATIENT
Start: 2024-01-20 | End: 2024-01-20

## 2024-01-20 RX ADMIN — CHLORTHALIDONE 25 MG: 25 TABLET ORAL at 21:39

## 2024-01-20 RX ADMIN — ATORVASTATIN CALCIUM 10 MG: 20 TABLET, FILM COATED ORAL at 21:38

## 2024-01-20 RX ADMIN — POTASSIUM CHLORIDE 10 MEQ: 7.46 INJECTION, SOLUTION INTRAVENOUS at 03:15

## 2024-01-20 RX ADMIN — POTASSIUM CHLORIDE 10 MEQ: 7.46 INJECTION, SOLUTION INTRAVENOUS at 00:47

## 2024-01-20 RX ADMIN — AMLODIPINE BESYLATE 10 MG: 10 TABLET ORAL at 09:53

## 2024-01-20 RX ADMIN — POTASSIUM CHLORIDE AND SODIUM CHLORIDE 100 ML/HR: 900; 150 INJECTION, SOLUTION INTRAVENOUS at 08:12

## 2024-01-20 RX ADMIN — ASPIRIN 81 MG: 81 TABLET, COATED ORAL at 21:47

## 2024-01-20 RX ADMIN — CETIRIZINE HYDROCHLORIDE 10 MG: 10 TABLET ORAL at 09:53

## 2024-01-20 RX ADMIN — MAGNESIUM SULFATE HEPTAHYDRATE 2 G: 40 INJECTION, SOLUTION INTRAVENOUS at 02:19

## 2024-01-20 RX ADMIN — CYCLOSPORINE 1 DROP: 0.5 EMULSION OPHTHALMIC at 21:39

## 2024-01-20 RX ADMIN — POTASSIUM CHLORIDE AND SODIUM CHLORIDE 100 ML/HR: 900; 150 INJECTION, SOLUTION INTRAVENOUS at 19:48

## 2024-01-20 RX ADMIN — DOCUSATE SODIUM 50MG AND SENNOSIDES 8.6MG 2 TABLET: 8.6; 5 TABLET, FILM COATED ORAL at 21:47

## 2024-01-20 NOTE — CONSULTS
"Nutrition Services    Patient Name:  Eveline Govea  YOB: 1955  MRN: [id number]  Admit Date:  1/19/2024  Assessment Date:  01/20/24    Summary: Nutrition screen per RN admission screen  This is a 69 yo female with metastatic adenocarcinoma of the pancreas and is undergoing chemotherapy.  She is struggling with extreme fatigue and poor appetite.  Pt with poor po intake for breakfast this am, will try to eat more for lunch.  She has had Boost/Ensure supplements before with chocolate being her preferred flavor.    Plan/Recommendation  Good po intake encouraged daily  Will provide supplements BID  RD to follow.     CLINICAL NUTRITION ASSESSMENT      Reason for Assessment Nurse Admission Screen, Reduced Oral Intake Over The Last Month     Diagnosis/Problem    metastatic adenocarcinoma of the pancreas    Medical/Surgical History Past Medical History:   Diagnosis Date    Arthritis     H/O mammogram 09/01/2015    Dr Lizzy Gary    Hypertension     Lupus     Pancreatic cancer 2023       Past Surgical History:   Procedure Laterality Date    COLONOSCOPY  11/2014    Dr Rodgers    FOOT SURGERY  01/01/1990    Dr Urbina    HYSTERECTOMY  1999    Dr Amin    TOE SURGERY Bilateral     TUBAL ABDOMINAL LIGATION  1984    Dr Collins        Anthropometrics        Current Height  Current Weight  BMI kg/m2 Height: 160 cm (63\")  Weight: 55.4 kg (122 lb 2.2 oz) (01/20/24 0337)  Body mass index is 21.64 kg/m².   Adjusted BMI (if applicable)    BMI Category Normal/Healthy (18.4 - 24.9)   Ideal Body Weight (IBW) 115   Usual Body Weight (UBW) 130's   Weight Trend Loss, Gain   Weight History Wt Readings from Last 30 Encounters:   01/20/24 0337 55.4 kg (122 lb 2.2 oz)   01/19/24 2100 55.4 kg (122 lb 2.2 oz)   01/19/24 1707 55.3 kg (122 lb)   01/17/24 1316 56.8 kg (125 lb 3.2 oz)   01/15/24 0815 56.9 kg (125 lb 8 oz)   01/11/24 1315 56.5 kg (124 lb 9.6 oz)   01/04/24 1027 57.1 kg (125 lb 14.4 oz)   12/14/23 0915 59.4 kg (130 lb " 14.4 oz)   10/12/16 1131 62.6 kg (138 lb)   08/15/16 1632 63 kg (139 lb)   11/03/14 1714 65.3 kg (144 lb 0.1 oz)      --  Labs       Pertinent Labs    Results from last 7 days   Lab Units 01/20/24  0704 01/19/24  1737 01/15/24  0759   SODIUM mmol/L 134* 133* 140   POTASSIUM mmol/L 3.9 2.8* 3.0*   CHLORIDE mmol/L 99 92* 98   CO2 mmol/L 19.0* 24.0 29.8*   BUN mg/dL 13 16 20   CREATININE mg/dL 0.65 0.78 0.88   CALCIUM mg/dL 8.3* 9.4 9.6   BILIRUBIN mg/dL  --  0.4 0.4   ALK PHOS U/L  --  78 66   ALT (SGPT) U/L  --  23 15   AST (SGOT) U/L  --  27 31   GLUCOSE mg/dL 135* 139* 146*     Results from last 7 days   Lab Units 01/20/24  0704 01/19/24  1737 01/15/24  0759   MAGNESIUM mg/dL 2.5* 1.5* 1.7   HEMOGLOBIN g/dL 10.0*  10.2* 11.5* 11.1*   HEMATOCRIT % 29.9*  31.4* 34.1 33.1*   WBC 10*3/mm3 13.39*  13.58* 14.85* 3.97   ALBUMIN g/dL  --  3.9 3.8     Results from last 7 days   Lab Units 01/20/24  0704 01/19/24  1737 01/15/24  0759   PLATELETS 10*3/mm3 110*  117* 142 184     COVID19   Date Value Ref Range Status   01/19/2024 Not Detected Not Detected - Ref. Range Final     Lab Results   Component Value Date    HGBA1C 6.4 (H) 10/02/2023          Medications           Scheduled Medications amLODIPine, 10 mg, Oral, Daily  aspirin, 81 mg, Oral, Daily  atenolol, 50 mg, Oral, Q24H   And  chlorthalidone, 25 mg, Oral, Q24H  atorvastatin, 10 mg, Oral, Daily  cetirizine, 10 mg, Oral, Daily  cycloSPORINE, 1 drop, Both Eyes, BID  potassium chloride, 20 mEq, Oral, Daily  senna-docusate sodium, 2 tablet, Oral, BID       Infusions sodium chloride 0.9 % with KCl 20 mEq, 100 mL/hr, Last Rate: Stopped (01/20/24 1003)       PRN Medications   acetaminophen    senna-docusate sodium **AND** polyethylene glycol **AND** bisacodyl **AND** bisacodyl    Calcium Replacement - Follow Nurse / BPA Driven Protocol    lidocaine    Magnesium Standard Dose Replacement - Follow Nurse / BPA Driven Protocol    melatonin    Morphine    ondansetron ODT **OR**  ondansetron    Phosphorus Replacement - Follow Nurse / BPA Driven Protocol    Potassium Replacement - Follow Nurse / BPA Driven Protocol    [COMPLETED] Insert Peripheral IV **AND** sodium chloride     Physical Findings          General Findings oriented   Oral/Mouth Cavity WDL   Edema  no edema   Gastrointestinal constipation, nausea, last bowel movement: 1/12   Skin  skin intact   Tubes/Drains/Lines none   NFPE No clinical signs of muscle wasting or fat loss   --  Current Nutrition Orders & Evaluation of Intake       Oral Nutrition     Food Allergies NKFA   Current PO Diet Diet: Cardiac Diets; Healthy Heart (2-3 Na+); Texture: Regular Texture (IDDSI 7); Fluid Consistency: Thin (IDDSI 0)   Supplement n/a   PO Evaluation     % PO Intake Little po for breakfast this am    Factors Affecting Intake: abdominal pain, constipation, decreased appetite, nausea   --  PES STATEMENT / NUTRITION DIAGNOSIS      Nutrition Dx Problem  Problem: Inadequate Oral Intake  Etiology: pancreatic cancer  , decrease appetite, weakness  Signs/Symptoms: PO intake and Report/Observation     NUTRITION INTERVENTION / PLAN OF CARE      Intervention Goal(s) Maintain nutrition status, Reduce/improve symptoms, Disease management/therapy, Tolerate PO , Increase intake, Maintain weight, and PO intake goal %: 75+         RD Intervention/Action Interview for preferences, Encourage intake, Continue to monitor, and Recommend/order: ONS   --      Prescription/Orders:       PO Diet       Supplements Boost/Ensure- chocolate      Enteral Nutrition       Parenteral Nutrition    New Prescription Ordered? Yes   --      Monitor/Evaluation Per protocol   Discharge Plan/Needs Pending clinical course   --    RD to follow per protocol.      Electronically signed by:  Felicita Schaefer RD  01/20/24 10:26 EST

## 2024-01-20 NOTE — ED NOTES
"Nursing report ED to floor  Eveline Govea  68 y.o.  female    HPI :   Chief Complaint   Patient presents with    Weakness - Generalized    Nausea       Admitting doctor:   Aleida Dodd MD    Admitting diagnosis:   The primary encounter diagnosis was Hypokalemia. Diagnoses of Chemotherapy-induced fatigue and Leukocytosis, unspecified type were also pertinent to this visit.    Code status:   Current Code Status       Date Active Code Status Order ID Comments User Context       Not on file            Allergies:   Ace inhibitors    Isolation:   Enhanced Droplet/Contact     Intake and Output    Intake/Output Summary (Last 24 hours) at 1/19/2024 1937  Last data filed at 1/19/2024 1855  Gross per 24 hour   Intake 500 ml   Output --   Net 500 ml       Weight:       01/19/24  1707   Weight: 55.3 kg (122 lb)       Most recent vitals:   Vitals:    01/19/24 1707 01/19/24 1711 01/19/24 1811   BP:  147/80 138/70   Pulse: 99     Resp: 16     Temp: 97.2 °F (36.2 °C)     TempSrc: Tympanic     SpO2: 99%  100%   Weight: 55.3 kg (122 lb)     Height: 160 cm (63\")         Active LDAs/IV Access:   Lines, Drains & Airways       Active LDAs       Name Placement date Placement time Site Days    Peripheral IV 01/19/24 1745 Anterior;Left Forearm 01/19/24 1745  Forearm  less than 1    Single Lumen Implantable Port Right Subclavian --  --  Subclavian  --                    Labs (abnormal labs have a star):   Labs Reviewed   COMPREHENSIVE METABOLIC PANEL - Abnormal; Notable for the following components:       Result Value    Glucose 139 (*)     Sodium 133 (*)     Potassium 2.8 (*)     Chloride 92 (*)     Anion Gap 17.0 (*)     All other components within normal limits    Narrative:     GFR Normal >60  Chronic Kidney Disease <60  Kidney Failure <15     CBC WITH AUTO DIFFERENTIAL - Abnormal; Notable for the following components:    WBC 14.85 (*)     Hemoglobin 11.5 (*)     RDW 11.5 (*)     RDW-SD 35.0 (*)     All other components " within normal limits   MANUAL DIFFERENTIAL - Abnormal; Notable for the following components:    Neutrophil % 97.9 (*)     Lymphocyte % 2.1 (*)     Neutrophils Absolute 14.54 (*)     Lymphocytes Absolute 0.31 (*)     All other components within normal limits   RESPIRATORY PANEL PCR W/ COVID-19 (SARS-COV-2), NP SWAB IN UTM/VTP, 2 HR TAT - Normal    Narrative:     In the setting of a positive respiratory panel with a viral infection PLUS a negative procalcitonin without other underlying concern for bacterial infection, consider observing off antibiotics or discontinuation of antibiotics and continue supportive care. If the respiratory panel is positive for atypical bacterial infection (Bordetella pertussis, Chlamydophila pneumoniae, or Mycoplasma pneumoniae), consider antibiotic de-escalation to target atypical bacterial infection.   URINALYSIS W/ MICROSCOPIC IF INDICATED (NO CULTURE)   CBC AND DIFFERENTIAL    Narrative:     The following orders were created for panel order CBC & Differential.  Procedure                               Abnormality         Status                     ---------                               -----------         ------                     CBC Auto Differential[[id number]]        Abnormal            Final result                 Please view results for these tests on the individual orders.       EKG:   ECG 12 Lead Other; fatigue   Preliminary Result   HEART RATE= 76  bpm   RR Interval= 789  ms   OR Interval= 138  ms   P Horizontal Axis= 23  deg   P Front Axis= 28  deg   QRSD Interval= 79  ms   QT Interval= 351  ms   QTcB= 395  ms   QRS Axis= 41  deg   T Wave Axis= -9  deg   - BORDERLINE ECG -   Sinus rhythm   Borderline T abnormalities, inferior leads   Electronically Signed By:    Date and Time of Study: 2024-01-19 18:01:17          Meds given in ED:   Medications   sodium chloride 0.9 % flush 10 mL (has no administration in time range)   potassium chloride 10 mEq in 100 mL IVPB (has no  administration in time range)   potassium chloride (K-DUR,KLOR-CON) ER tablet 40 mEq (has no administration in time range)   sodium chloride 0.9 % bolus 500 mL (0 mL Intravenous Stopped 1/19/24 1855)   ondansetron (ZOFRAN) injection 4 mg (4 mg Intravenous Given 1/19/24 1919)       Imaging results:  XR Chest 1 View    Result Date: 1/19/2024  Negative.  This report was finalized on 1/19/2024 6:02 PM by Dr. Cuauhtemoc Juarez M.D on Workstation: [employer]       Ambulatory status:   - standby assist    Social issues:   Social History     Socioeconomic History    Marital status:     Number of children: 2   Tobacco Use    Smoking status: Never   Substance and Sexual Activity    Alcohol use: No    Drug use: Never    Sexual activity: Defer       NIH Stroke Scale:       Renetta Null RN  01/19/24 19:37 EST

## 2024-01-20 NOTE — PLAN OF CARE
Goal Outcome Evaluation:  Plan of Care Reviewed With: patient, spouse        Progress: improving  Outcome Evaluation: Pt admitted through ER with NS with KCl infusing--has had one run of Potassium and was attempting to drink some without success--she stated she just couldn't do it--makes nauseated. 2 Magnesium runs and 3 more potassium runs given over night.  at bedside, urine sent to lab. A&Ox4, on room air, VSS.

## 2024-01-20 NOTE — PLAN OF CARE
Goal Outcome Evaluation:    A&OX4, calm and cooperative. Up with stand by assistance, help from . Loose stool noted, refused stool softener. IV fluids cont, K 3.9 refused PO K. VSS on room air, CTM.

## 2024-01-20 NOTE — CONSULTS
Subjective     REASON FOR CONSULTATION:    Metastatic pancreatic cancer  Provide an opinion on any further workup or treatment                             REQUESTING PHYSICIAN:      RECORDS OBTAINED:  Records of the patients history including those obtained from the referring provider were reviewed and summarized in detail.    HISTORY OF PRESENT ILLNESS:      Patient is a 68-year-old female who has been diagnosed with metastatic adenocarcinoma the pancreas with carcinomatosis.  She was seen by our nurse practitioner Cata on January 15, 2024.  Patient was diagnosed back in November 2023 with peritoneal carcinomatosis.  And had primary pancreatic malignancy with history of pancreatic extension and vascular involvement with shotty retroperitoneal lymphadenopathy.  Patient also had a bladder wall thickening and nonocclusive thrombus of the left ovarian vein and noncalcified pulmonary nodules.    Had shown evidence of peritoneal carcinomatosis with multiple hypermetabolic peritoneal implants    There was history of extensive family history of breast cancer and patient underwent genetic testing.    Was started on modified FOLFIRINOX with growth factor support every 2 weeks starting January 15, 2024 when cycle 1 was given.    And also has history of chronic leukopenia and neutropenia followed by Dr. Finley    Patient is followed by Dr. Boyer in our office.    Patient was followed by Cecilia Saba on January 17.  She came for unhooking of the chemotherapy.  She has not been eating well.  She just to cut chocolate Ensure.  She called our office January 19 as she has not been eating or drinking.  Not nauseated.  Got admitted because of lack of p.o. intake, severe fatigue nausea weakness and some abdominal discomfort.  Globin on admission was 11.5, white count of 14.85 and platelet count of 142.  Her potassium was 2.8    This morning her hemoglobin is down to 10.2 with a platelet of 117 and white count of 13 she is  afebrile and blood pressure stable at 116 x 62 and oxygen saturation is 100%.  Chest x-ray negative.     Past Medical History:   Diagnosis Date    Arthritis     H/O mammogram 09/01/2015    Dr Lizzy Gary    Hypertension     Lupus     Pancreatic cancer 2023        Past Surgical History:   Procedure Laterality Date    COLONOSCOPY  11/2014    Dr Rodgers    FOOT SURGERY  01/01/1990    Dr Urbina    HYSTERECTOMY  1999    Dr Amin    TOE SURGERY Bilateral     TUBAL ABDOMINAL LIGATION  1984    Dr Collins        No current facility-administered medications on file prior to encounter.     Current Outpatient Medications on File Prior to Encounter   Medication Sig Dispense Refill    amLODIPine (NORVASC) 10 MG tablet Take 1 tablet by mouth Daily.      aspirin 81 MG tablet Take 1 tablet by mouth Daily.      atenolol-chlorthalidone (TENORETIC) 50-25 MG per tablet Take 1 tablet by mouth Daily.      Biotin 5000 MCG tablet Take 1 tablet by mouth daily.      CALCIUM PO Take 2 tablets by mouth daily.      loratadine (CLARITIN) 10 MG tablet Take 1 tablet by mouth Daily.      lovastatin (MEVACOR) 20 MG tablet Take 1 tablet by mouth every night. 90 tablet 3    RESTASIS 0.05 % ophthalmic emulsion Administer 1 drop to both eyes 2 (Two) Times a Day.      betamethasone dipropionate (DIPROLENE) 0.05 % ointment Apply  topically daily as needed. (Patient not taking: Reported on 1/19/2024)      Diclofenac Sodium (VOLTAREN) 1 % gel gel Apply 4 g topically to the appropriate area as directed. (Patient not taking: Reported on 1/19/2024)      lidocaine-prilocaine (EMLA) 2.5-2.5 % cream APPLY NICKEL SIZE AMOUNT DIRECTLY ON PORT SITE 30-60 MINUTES PRIOR TO HAVING PORT ACCESSED. COVER WITH SARAN WRAP. 30 g 1    Omega-3 Fatty Acids (FISH OIL) 1000 MG capsule capsule Take 1 capsule by mouth Daily With Breakfast. (Patient not taking: Reported on 1/19/2024)      ondansetron (ZOFRAN) 8 MG tablet Take 1 tablet by mouth 3 (Three) Times a Day As Needed for  Nausea or Vomiting. 30 tablet 5    phenazopyridine (PYRIDIUM) 200 MG tablet  (Patient not taking: Reported on 1/19/2024)      potassium chloride (K-DUR,KLOR-CON) 20 MEQ CR tablet Take 1 tablet by mouth daily. (Patient not taking: Reported on 1/19/2024) 90 tablet 3    sulfamethoxazole-trimethoprim (BACTRIM DS,SEPTRA DS) 800-160 MG per tablet  (Patient not taking: Reported on 1/19/2024)          ALLERGIES:    Allergies   Allergen Reactions    Ace Inhibitors         Social History     Socioeconomic History    Marital status:     Number of children: 2   Tobacco Use    Smoking status: Never   Vaping Use    Vaping Use: Never used   Substance and Sexual Activity    Alcohol use: No    Drug use: Never    Sexual activity: Defer        Family History   Problem Relation Age of Onset    Heart attack Mother     Hypertension Mother     Diabetes Mother     Cancer Mother     Breast cancer Mother     Kidney failure Mother     Heart disease Father     Heart attack Father     Hypertension Father     Diabetes Father     Hypertension Sister     Breast cancer Sister     Cancer Sister     Hypertension Sister     Breast cancer Sister         Review of Systems   Constitutional:  Positive for fatigue. Negative for appetite change, chills, diaphoresis, fever and unexpected weight change.   HENT:  Negative for hearing loss, sore throat and trouble swallowing.    Respiratory:  Negative for cough, chest tightness, shortness of breath and wheezing.    Cardiovascular:  Negative for chest pain, palpitations and leg swelling.   Gastrointestinal:  Negative for abdominal distention, abdominal pain, constipation, diarrhea, nausea and vomiting.   Genitourinary:  Negative for dysuria, frequency, hematuria and urgency.   Musculoskeletal:  Negative for joint swelling.        No muscle weakness.   Skin:  Negative for rash and wound.   Neurological:  Negative for seizures, syncope, speech difficulty, weakness, numbness and headaches.   Hematological:   Negative for adenopathy. Does not bruise/bleed easily.   Psychiatric/Behavioral:  Negative for behavioral problems, confusion and suicidal ideas.     Decreased po intake      Objective     Vitals:    01/19/24 2100 01/20/24 0326 01/20/24 0337 01/20/24 0727   BP: 151/76 127/70  116/62   BP Location: Right arm Right arm  Right arm   Patient Position: Lying Lying  Lying   Pulse: 93 87  72   Resp: 16 16  16   Temp: 100 °F (37.8 °C) 98.6 °F (37 °C)  98.2 °F (36.8 °C)   TempSrc: Oral Oral  Oral   SpO2: 99% 100%  100%   Weight: 55.4 kg (122 lb 2.2 oz)  55.4 kg (122 lb 2.2 oz)    Height:             1/17/2024     1:16 PM   Current Status   ECOG score 0       Physical Exam      RESPIRATORY:  Normal respiratory effort.  No rales  or wheezing, clear.   CARDIOVASCULAR:  Regular rate and rhythm, no murmur  No significant lower extremity edema.  Abdomen: Soft nontender positive bowel sounds no hepatosplenomegaly  SKIN: No wounds.  No rashes.  MUSCULOSKELETAL/EXTREMITIES: No clubbing or cyanosis.  No apparent unilateral weakness.  NEURO: CN 2-12 appear intact. No focal neurological deficits noted.  PSYCHIATRIC:  Normal judgment and insight.  Normal mood and affect.      RECENT LABS:  Hematology WBC   Date Value Ref Range Status   01/20/2024 13.58 (H) 3.40 - 10.80 10*3/mm3 Final     RBC   Date Value Ref Range Status   01/20/2024 3.56 (L) 3.77 - 5.28 10*6/mm3 Final     Hemoglobin   Date Value Ref Range Status   01/20/2024 10.2 (L) 12.0 - 15.9 g/dL Final     Hematocrit   Date Value Ref Range Status   01/20/2024 31.4 (L) 34.0 - 46.6 % Final     Platelets   Date Value Ref Range Status   01/20/2024 117 (L) 140 - 450 10*3/mm3 Final          Assessment & Plan     *.  Admitted with dehydration, decreased p.o. intake, hypokalemia following treatment with FOLFIRINOX on January 15, 2024 by Dr. Boyer.  Cycle 1 given  Very poor intake  Hypotensive when admitted now with normal blood pressure    *.  Anemia and Thrombocytopenia not neutropenic  yet      *Metastatic adenocarcinoma of the pancreas with carcinomatosis  She presented with a several month history of abdominal pain.  11/22/2023: CT abdomen and pelvis with peritoneal carcinomatosis, small volume ascites, abnormal appearance of the body and tail of the pancreas consistent with primary pancreas malignancy with extrapancreatic extension and vascular involvement, shotty retroperitoneal lymphadenopathy, bladder wall thickening, nonocclusive thrombus of the left ovarian vein, noncalcified pulmonary nodules.  11/29/2023: Endoscopic ultrasound with biopsy of the pancreas mass showed adenocarcinoma  12/2/2023: PET CT scan with evident hypermetabolic malignancy in the pancreas body with evidence for peritoneal carcinomatosis with multiple hypermetabolic peritoneal implants, indeterminant small 3 mm right lower lobe lung nodule.  She had a port placed by Dr. Crooks with surgical oncology earlier this week.  On 12/13/2023 she saw Dr. Castillo with medical oncology at [facility].  A clinical trial may be available there.  She has been referred to genetic counseling due to an extensive family history of breast and other cancers.  She states that she had BRCA testing done a number of years ago that was negative.  It appears that Dr. Castillo is evaluating for mutations otherwise as he evaluates her for candidacy of the clinical trial.  Otherwise he discussed palliative chemotherapy options including modified FOLFIRINOX and Gemzar/Abraxane.  She is also going to see medical oncologist at the [facility] next week as well.  She is seen again on 1/4/2024 having been seen at the [facility].  She is interested in treatment here with our group.  Plan modified FOLFIRINOX with growth factor support due to chronic leukopenia/neutropenia.  Plan modified FOLFIRINOX with growth factor support every 2 weeks at the usual doses.  Oxaliplatin 85 mg/m², irinotecan 150 mg/m², 5-FU 2400 mg/m² over 46 hours.   Neulasta on day 3 if approved by insurance.  Treatment is palliative and the patient understands this.  1/15/2024 cycle 1 FOLFIRINOX        *Chronic leukopenia/neutropenia  Followed by Dr. Marshall at [place]  ANC is normal  1/4/2024 at 2.04     *Cancer related pain: This is mild at this point we will continue to monitor.  She is not really taking any pain medication for this at this point.     *Anorexia and dysgeusia with weight loss  We will have her see our nutritionist     *Anxiety regarding her health  We discussed potential referral to our supportive oncology program today but she prefers not to schedule this at this time.  We will continue to address this.     *Hypokalemia:  1/15/2024 potassium is 3.0.  She will be given 40 mEq p.o. potassium in the office today.  She also continues on home potassium 20 mEq daily.     Plan  Continue IV fluids  Replenish potassium  So far patient is not neutropenic or febrile  Star kenny Ortiz MD

## 2024-01-20 NOTE — H&P
HISTORY AND PHYSICAL   [facility]        Date of Admission: 2024  Patient Identification:  Name: Eveline Govea  Age: 68 y.o.  Sex: female  :  1955  MRN: [id number]                     Primary Care Physician: Rosibel Seymour MD    Chief Complaint:  68 year old female who presented to the emergency room with weakness, nausea and poor po intake; she has a history of pancreatic cancer and is undergoing chemotherapy; she denies fever or chills; she is followed by dr montero; she has had some abdominal discomfort    History of Present Illness:   As above    Past Medical History:  Past Medical History:   Diagnosis Date    Arthritis     H/O mammogram 2015    Dr Lizzy Gary    Hypertension     Lupus     Pancreatic cancer      Past Surgical History:  Past Surgical History:   Procedure Laterality Date    COLONOSCOPY  2014    Dr Rodgers    FOOT SURGERY  1990    Dr Urbina    HYSTERECTOMY      Dr Amin    TOE SURGERY Bilateral     TUBAL ABDOMINAL LIGATION      Dr Collins      Home Meds:  Medications Prior to Admission   Medication Sig Dispense Refill Last Dose    amLODIPine (NORVASC) 10 MG tablet Take 1 tablet by mouth Daily.   2024 at 0900    aspirin 81 MG tablet Take 1 tablet by mouth Daily.   2024 at 0900    atenolol-chlorthalidone (TENORETIC) 50-25 MG per tablet Take 1 tablet by mouth Daily.   2024 at 1700    Biotin 5000 MCG tablet Take 1 tablet by mouth daily.   2024 at 1700    CALCIUM PO Take 2 tablets by mouth daily.   2024 at 1700    loratadine (CLARITIN) 10 MG tablet Take 1 tablet by mouth Daily.   2024 at 0900    lovastatin (MEVACOR) 20 MG tablet Take 1 tablet by mouth every night. 90 tablet 3 2024 at 2100    RESTASIS 0.05 % ophthalmic emulsion Administer 1 drop to both eyes 2 (Two) Times a Day.   2024 at 2100    betamethasone dipropionate (DIPROLENE) 0.05 % ointment Apply  topically daily as needed. (Patient not taking:  Reported on 1/19/2024)   Not Taking    Diclofenac Sodium (VOLTAREN) 1 % gel gel Apply 4 g topically to the appropriate area as directed. (Patient not taking: Reported on 1/19/2024)   Not Taking    lidocaine-prilocaine (EMLA) 2.5-2.5 % cream APPLY NICKEL SIZE AMOUNT DIRECTLY ON PORT SITE 30-60 MINUTES PRIOR TO HAVING PORT ACCESSED. COVER WITH SARAN WRAP. 30 g 1     Omega-3 Fatty Acids (FISH OIL) 1000 MG capsule capsule Take 1 capsule by mouth Daily With Breakfast. (Patient not taking: Reported on 1/19/2024)   Not Taking    ondansetron (ZOFRAN) 8 MG tablet Take 1 tablet by mouth 3 (Three) Times a Day As Needed for Nausea or Vomiting. 30 tablet 5     phenazopyridine (PYRIDIUM) 200 MG tablet  (Patient not taking: Reported on 1/19/2024)   Not Taking    potassium chloride (K-DUR,KLOR-CON) 20 MEQ CR tablet Take 1 tablet by mouth daily. (Patient not taking: Reported on 1/19/2024) 90 tablet 3 Not Taking    sulfamethoxazole-trimethoprim (BACTRIM DS,SEPTRA DS) 800-160 MG per tablet  (Patient not taking: Reported on 1/19/2024)   Not Taking       Allergies:  Allergies   Allergen Reactions    Ace Inhibitors      Immunizations:  Immunization History   Administered Date(s) Administered    COVID-19 (PFIZER) BIVALENT 12+YRS 11/09/2022    COVID-19 (PFIZER) Purple Cap Monovalent 02/23/2021, 03/16/2021, 10/10/2021    COVID-19 F23 (PFIZER) 12YRS+ (COMIRNATY) 11/10/2023    Covid-19 (Pfizer) Gray Cap Monovalent 05/25/2022    FluMist 2-49yrs 09/01/2015    Fluzone (or Fluarix & Flulaval for VFC) >6mos 09/12/2017    Fluzone High Dose =>65 Years ([employer] ONLY) 10/07/2019    Fluzone High-Dose 65+yrs 09/24/2020, 09/28/2021, 09/30/2022, 10/02/2023    Fluzone Quad >6mos (Multi-dose) 10/12/2016    Hepatitis A 11/08/2018    Influenza Injectable Mdck Pf Quad 10/18/2018    Pneumococcal Conjugate 13-Valent (PCV13) 06/01/2015    Pneumococcal Polysaccharide (PPSV23) 01/01/2015, 09/24/2020    Shingrix 06/11/2019, 12/03/2019    Td (TDVAX) 01/01/2013     "Zostavax 2014     Social History:   Social History     Social History Narrative    Not on file     Social History     Socioeconomic History    Marital status:     Number of children: 2   Tobacco Use    Smoking status: Never   Substance and Sexual Activity    Alcohol use: No    Drug use: Never    Sexual activity: Defer       Family History:  Family History   Problem Relation Age of Onset    Heart attack Mother     Hypertension Mother     Diabetes Mother     Cancer Mother     Breast cancer Mother     Kidney failure Mother     Heart disease Father     Heart attack Father     Hypertension Father     Diabetes Father     Hypertension Sister     Breast cancer Sister     Cancer Sister     Hypertension Sister     Breast cancer Sister         Review of Systems  See history of present illness and past medical history.  Patient denies headache, dizziness, syncope, falls, trauma, change in vision, change in hearing,  focal weakness, numbness, or paresthesia.  Patient denies chest pain, palpitations, dyspnea, orthopnea, PND, cough, sinus pressure, rhinorrhea, epistaxis, hemoptysis,  ,hematemesis, diarrhea, constipation or hematochezia.  Denies cold or heat intolerance, polydipsia, polyuria, polyphagia. Denies hematuria, pyuria, dysuria, hesitancy, frequency or urgency. Denies consumption of raw and under cooked meats foods or change in water source.  Denies fever, chills, sweats, night sweats.   .    Objective:  T Max 24 hrs: Temp (24hrs), Av.6 °F (37 °C), Min:97.2 °F (36.2 °C), Max:100 °F (37.8 °C)    Vitals Ranges:   Temp:  [97.2 °F (36.2 °C)-100 °F (37.8 °C)] 100 °F (37.8 °C)  Heart Rate:  [77-99] 93  Resp:  [16] 16  BP: (122-151)/(70-80) 151/76      Exam:  /76 (BP Location: Right arm, Patient Position: Lying)   Pulse 93   Temp 100 °F (37.8 °C) (Oral)   Resp 16   Ht 160 cm (63\")   Wt 55.4 kg (122 lb 2.2 oz)   SpO2 99%   BMI 21.64 kg/m²     General Appearance:    Alert, cooperative, no distress, " appears stated age   Head:    Normocephalic, without obvious abnormality, atraumatic   Eyes:    PERRL, conjunctivae/corneas clear, EOM's intact, both eyes   Ears:    Normal external ear canals, both ears   Nose:   Nares normal, septum midline, mucosa normal, no drainage    or sinus tenderness   Throat:   Lips, mucosa, and tongue normal   Neck:   Supple, symmetrical, trachea midline, no adenopathy;     thyroid:  no enlargement/tenderness/nodules; no carotid    bruit or JVD   Back:     Symmetric, no curvature, ROM normal, no CVA tenderness   Lungs:     Decreased breath sounds bilaterally, respirations unlabored   Chest Wall:    No tenderness or deformity    Heart:    Regular rate and rhythm, S1 and S2 normal, no murmur, rub   or gallop   Abdomen:     Soft, nontender, bowel sounds active all four quadrants,     no masses, no hepatomegaly, no splenomegaly   Extremities:   Extremities normal, atraumatic, no cyanosis or edema                       .    Data Review:  Labs in chart were reviewed.  WBC   Date Value Ref Range Status   01/19/2024 14.85 (H) 3.40 - 10.80 10*3/mm3 Final     Hemoglobin   Date Value Ref Range Status   01/19/2024 11.5 (L) 12.0 - 15.9 g/dL Final     Hematocrit   Date Value Ref Range Status   01/19/2024 34.1 34.0 - 46.6 % Final     Platelets   Date Value Ref Range Status   01/19/2024 142 140 - 450 10*3/mm3 Final     Sodium   Date Value Ref Range Status   01/19/2024 133 (L) 136 - 145 mmol/L Final     Potassium   Date Value Ref Range Status   01/19/2024 2.8 (L) 3.5 - 5.2 mmol/L Final     Chloride   Date Value Ref Range Status   01/19/2024 92 (L) 98 - 107 mmol/L Final     CO2   Date Value Ref Range Status   01/19/2024 24.0 22.0 - 29.0 mmol/L Final     BUN   Date Value Ref Range Status   01/19/2024 16 8 - 23 mg/dL Final     Creatinine   Date Value Ref Range Status   01/19/2024 0.78 0.57 - 1.00 mg/dL Final     Glucose   Date Value Ref Range Status   01/19/2024 139 (H) 65 - 99 mg/dL Final     Calcium   Date  Value Ref Range Status   01/19/2024 9.4 8.6 - 10.5 mg/dL Final     Magnesium   Date Value Ref Range Status   01/19/2024 1.5 (L) 1.6 - 2.4 mg/dL Final                Imaging Results (All)       Procedure Component Value Units Date/Time    XR Chest 1 View [[id number]] Collected: 01/19/24 1802     Updated: 01/19/24 1805    Narrative:      PORTABLE CHEST X-RAY     HISTORY: Fatigue.     Portable chest x-ray is provided. Correlation: None.     FINDINGS: Right IJ Mediport catheter. The cardiomediastinal silhouette  is normal. The lungs are clear. The costophrenic sulci are dry and the  bones appear normal. There is no pneumothorax.       Impression:      Negative.     This report was finalized on 1/19/2024 6:02 PM by Dr. Cuauhtemoc Juarez M.D on Workstation: [id number]                 Assessment:  Active Hospital Problems    Diagnosis  POA    **Hypokalemia [E87.6]  Yes      Resolved Hospital Problems   No resolved problems to display.   Nausea  Abdominal pain  Pancreatic cancer  Anemia  hyperglycemia    Plan:  Will continue fluids  Replace potassium  Ask oncology to see her  Pain meds  Wbc elevated due to neulasta  Dw patient,  and ed provider    Aleida Dodd MD  1/19/2024  22:34 EST

## 2024-01-21 LAB
ALBUMIN SERPL-MCNC: 3.2 G/DL (ref 3.5–5.2)
ALBUMIN/GLOB SERPL: 1.5 G/DL
ALP SERPL-CCNC: 73 U/L (ref 39–117)
ALT SERPL W P-5'-P-CCNC: 16 U/L (ref 1–33)
ANION GAP SERPL CALCULATED.3IONS-SCNC: 9 MMOL/L (ref 5–15)
AST SERPL-CCNC: 21 U/L (ref 1–32)
BILIRUB SERPL-MCNC: 0.2 MG/DL (ref 0–1.2)
BUN SERPL-MCNC: 6 MG/DL (ref 8–23)
BUN/CREAT SERPL: 11.3 (ref 7–25)
BURR CELLS BLD QL SMEAR: ABNORMAL
CALCIUM SPEC-SCNC: 8 MG/DL (ref 8.6–10.5)
CANCER AG19-9 SERPL-ACNC: 586 U/ML
CHLORIDE SERPL-SCNC: 100 MMOL/L (ref 98–107)
CO2 SERPL-SCNC: 23 MMOL/L (ref 22–29)
CREAT SERPL-MCNC: 0.53 MG/DL (ref 0.57–1)
DEPRECATED RDW RBC AUTO: 37.4 FL (ref 37–54)
EGFRCR SERPLBLD CKD-EPI 2021: 100.3 ML/MIN/1.73
ERYTHROCYTE [DISTWIDTH] IN BLOOD BY AUTOMATED COUNT: 11.8 % (ref 12.3–15.4)
GLOBULIN UR ELPH-MCNC: 2.2 GM/DL
GLUCOSE SERPL-MCNC: 105 MG/DL (ref 65–99)
HCT VFR BLD AUTO: 29.1 % (ref 34–46.6)
HGB BLD-MCNC: 9.5 G/DL (ref 12–15.9)
LYMPHOCYTES # BLD MANUAL: 0.68 10*3/MM3 (ref 0.7–3.1)
LYMPHOCYTES NFR BLD MANUAL: 1 % (ref 5–12)
MCH RBC QN AUTO: 28.4 PG (ref 26.6–33)
MCHC RBC AUTO-ENTMCNC: 32.6 G/DL (ref 31.5–35.7)
MCV RBC AUTO: 87.1 FL (ref 79–97)
MONOCYTES # BLD: 0.09 10*3/MM3 (ref 0.1–0.9)
NEUTROPHILS # BLD AUTO: 8.64 10*3/MM3 (ref 1.7–7)
NEUTROPHILS NFR BLD MANUAL: 91.8 % (ref 42.7–76)
PLAT MORPH BLD: NORMAL
PLATELET # BLD AUTO: 91 10*3/MM3 (ref 140–450)
PMV BLD AUTO: 11.6 FL (ref 6–12)
POIKILOCYTOSIS BLD QL SMEAR: ABNORMAL
POTASSIUM SERPL-SCNC: 3.7 MMOL/L (ref 3.5–5.2)
PROT SERPL-MCNC: 5.4 G/DL (ref 6–8.5)
RBC # BLD AUTO: 3.34 10*6/MM3 (ref 3.77–5.28)
SCHISTOCYTES BLD QL SMEAR: ABNORMAL
SODIUM SERPL-SCNC: 132 MMOL/L (ref 136–145)
VARIANT LYMPHS NFR BLD MANUAL: 7.2 % (ref 19.6–45.3)
WBC MORPH BLD: NORMAL
WBC NRBC COR # BLD AUTO: 9.41 10*3/MM3 (ref 3.4–10.8)

## 2024-01-21 PROCEDURE — 99232 SBSQ HOSP IP/OBS MODERATE 35: CPT | Performed by: INTERNAL MEDICINE

## 2024-01-21 PROCEDURE — 85027 COMPLETE CBC AUTOMATED: CPT | Performed by: INTERNAL MEDICINE

## 2024-01-21 PROCEDURE — 86301 IMMUNOASSAY TUMOR CA 19-9: CPT | Performed by: INTERNAL MEDICINE

## 2024-01-21 PROCEDURE — 25010000002 MORPHINE PER 10 MG: Performed by: INTERNAL MEDICINE

## 2024-01-21 PROCEDURE — 85007 BL SMEAR W/DIFF WBC COUNT: CPT | Performed by: INTERNAL MEDICINE

## 2024-01-21 PROCEDURE — 25010000002 SODIUM CHLORIDE 0.9 % WITH KCL 20 MEQ 20-0.9 MEQ/L-% SOLUTION: Performed by: INTERNAL MEDICINE

## 2024-01-21 PROCEDURE — 80053 COMPREHEN METABOLIC PANEL: CPT | Performed by: INTERNAL MEDICINE

## 2024-01-21 RX ORDER — HYDROCODONE BITARTRATE AND ACETAMINOPHEN 7.5; 325 MG/1; MG/1
1 TABLET ORAL EVERY 4 HOURS PRN
Status: DISCONTINUED | OUTPATIENT
Start: 2024-01-21 | End: 2024-01-24

## 2024-01-21 RX ORDER — MORPHINE SULFATE 2 MG/ML
2 INJECTION, SOLUTION INTRAMUSCULAR; INTRAVENOUS EVERY 4 HOURS PRN
Status: DISCONTINUED | OUTPATIENT
Start: 2024-01-21 | End: 2024-01-27 | Stop reason: HOSPADM

## 2024-01-21 RX ADMIN — AMLODIPINE BESYLATE 10 MG: 10 TABLET ORAL at 08:55

## 2024-01-21 RX ADMIN — NYSTATIN 500000 UNITS: 100000 SUSPENSION ORAL at 08:55

## 2024-01-21 RX ADMIN — ATENOLOL 50 MG: 50 TABLET ORAL at 20:34

## 2024-01-21 RX ADMIN — POTASSIUM CHLORIDE AND SODIUM CHLORIDE 100 ML/HR: 900; 150 INJECTION, SOLUTION INTRAVENOUS at 08:55

## 2024-01-21 RX ADMIN — ATORVASTATIN CALCIUM 10 MG: 20 TABLET, FILM COATED ORAL at 20:44

## 2024-01-21 RX ADMIN — MORPHINE SULFATE 2 MG: 2 INJECTION, SOLUTION INTRAMUSCULAR; INTRAVENOUS at 12:40

## 2024-01-21 RX ADMIN — NYSTATIN 500000 UNITS: 100000 SUSPENSION ORAL at 12:40

## 2024-01-21 RX ADMIN — ASPIRIN 81 MG: 81 TABLET, COATED ORAL at 20:44

## 2024-01-21 RX ADMIN — ATENOLOL 50 MG: 50 TABLET ORAL at 00:28

## 2024-01-21 RX ADMIN — CYCLOSPORINE 1 DROP: 0.5 EMULSION OPHTHALMIC at 20:34

## 2024-01-21 NOTE — PROGRESS NOTES
"    Name: Eveline Govea ADMIT: 2024   : 1955  PCP: Rosibel Seymour MD    MRN: [id number] LOS: 0 days   AGE/SEX: 68 y.o. female  ROOM: [facility]     Subjective   Subjective   Feels marginally better. Says food tastes \"like chalk\" and no appetite.        Objective   Objective   Vital Signs  Temp:  [98.2 °F (36.8 °C)-99 °F (37.2 °C)] 99 °F (37.2 °C)  Heart Rate:  [71-87] 74  Resp:  [16] 16  BP: (116-139)/(60-70) 139/69  SpO2:  [97 %-100 %] 97 %  on   ;   Device (Oxygen Therapy): room air  Body mass index is 21.64 kg/m².  Physical Exam  Vitals reviewed.   Constitutional:       General: She is not in acute distress.     Appearance: She is not ill-appearing.   HENT:      Head: Normocephalic and atraumatic.      Mouth/Throat:      Pharynx: Oropharyngeal exudate (tongue) present.   Cardiovascular:      Rate and Rhythm: Normal rate and regular rhythm.   Pulmonary:      Effort: Pulmonary effort is normal.   Abdominal:      General: There is no distension.      Palpations: Abdomen is soft.      Tenderness: There is no abdominal tenderness.   Musculoskeletal:      Right lower leg: No edema.      Left lower leg: No edema.   Skin:     General: Skin is warm and dry.   Neurological:      Mental Status: She is alert and oriented to person, place, and time.   Psychiatric:         Mood and Affect: Mood normal.       Results Review     I reviewed the patient's new clinical results.  Results from last 7 days   Lab Units 24  0704 24  1737 01/15/24  0759   WBC 10*3/mm3 13.39*  13.58* 14.85* 3.97   HEMOGLOBIN g/dL 10.0*  10.2* 11.5* 11.1*   PLATELETS 10*3/mm3 110*  117* 142 184     Results from last 7 days   Lab Units 24  0704 24  1737 01/15/24  0759   SODIUM mmol/L 134* 133* 140   POTASSIUM mmol/L 3.9 2.8* 3.0*   CHLORIDE mmol/L 99 92* 98   CO2 mmol/L 19.0* 24.0 29.8*   BUN mg/dL 13 16 20   CREATININE mg/dL 0.65 0.78 0.88   GLUCOSE mg/dL 135* 139* 146*   EGFR mL/min/1.73 95.4 82.3 71.2 " "    Results from last 7 days   Lab Units 01/19/24  1737 01/15/24  0759   ALBUMIN g/dL 3.9 3.8   BILIRUBIN mg/dL 0.4 0.4   ALK PHOS U/L 78 66   AST (SGOT) U/L 27 31   ALT (SGPT) U/L 23 15     Results from last 7 days   Lab Units 01/20/24  0704 01/19/24  1737 01/15/24  0759   CALCIUM mg/dL 8.3* 9.4 9.6   ALBUMIN g/dL  --  3.9 3.8   MAGNESIUM mg/dL 2.5* 1.5* 1.7       No results found for: \"HGBA1C\", \"POCGLU\"    XR Chest 1 View    Result Date: 1/19/2024  Negative.  This report was finalized on 1/19/2024 6:02 PM by Dr. Cuauhtemoc Juarez M.D on Workstation: [id number]       I have personally reviewed all medications:  Scheduled Medications  amLODIPine, 10 mg, Oral, Daily  aspirin, 81 mg, Oral, Daily  atenolol, 50 mg, Oral, Q24H   And  chlorthalidone, 25 mg, Oral, Q24H  atorvastatin, 10 mg, Oral, Daily  cetirizine, 10 mg, Oral, Daily  cycloSPORINE, 1 drop, Both Eyes, BID  nystatin, 5 mL, Swish & Spit, 4x Daily  potassium chloride, 20 mEq, Oral, Daily  senna-docusate sodium, 2 tablet, Oral, BID    Infusions  sodium chloride 0.9 % with KCl 20 mEq, 100 mL/hr, Last Rate: 100 mL/hr (01/20/24 1948)    Diet  Diet: Cardiac Diets; Healthy Heart (2-3 Na+); Texture: Regular Texture (IDDSI 7); Fluid Consistency: Thin (IDDSI 0)    I have personally reviewed:  [x]  Laboratory   [x]  Microbiology   [x]  Radiology   []  EKG/Telemetry  []  Cardiology/Vascular   []  Pathology    []  Records       Assessment/Plan     Active Hospital Problems    Diagnosis  POA    **Hypokalemia [E87.6]  Yes    Thrush [B37.0]  Yes    Malignant neoplasm of other parts of pancreas [C25.7]  Yes    Essential hypertension [I10]  Yes    SLE (systemic lupus erythematosus) [M32.9]  Yes      Resolved Hospital Problems   No resolved problems to display.       68 y.o. female with metastatic pancreatic cancer, just started cycle 1 of chemo admitted with Hypokalemia.    Dehydration and electrolyte abnormalities due to chemo and anorexia. Patient says she feels so poorly she " now no longer thinks she wants to be treated for her malignancy. I encouraged her to talk with oncologist first but will ask palliative team to come help them talk through options as well.    Cont replacement of lytes. Follow cell counts    Thrush noted: Add Nystatin    HTN: Hold chlorthalidone given dehydration      SCDs for DVT prophylaxis.  Dispo TBD      Wood Pradhan MD  [facility]  01/20/24  21:41 EST

## 2024-01-21 NOTE — PROGRESS NOTES
Subjective .     REASONS FOR FOLLOWUP:  Metastatic pancreatic cancer     Interval history:  January 21, 2024: Patient is s/p cycle 1 FOLFIRINOX with growth factor support  Hemoglobin 9.5, white count of 9.41 and platelet of 91.  Afebrile    Patient states she is in pain.  Her level of pain is 5 on a 1-10 scale in the abdomen.  We will try morphine PA.  2 mg IV every 4 hours as needed    HISTORY OF PRESENT ILLNESS:    Patient is a 68-year-old female who has been diagnosed with metastatic adenocarcinoma the pancreas with carcinomatosis.  She was seen by our nurse practitioner Cata on January 15, 2024.  Patient was diagnosed back in November 2023 with peritoneal carcinomatosis.  And had primary pancreatic malignancy with history of pancreatic extension and vascular involvement with shotty retroperitoneal lymphadenopathy.  Patient also had a bladder wall thickening and nonocclusive thrombus of the left ovarian vein and noncalcified pulmonary nodules.     Had shown evidence of peritoneal carcinomatosis with multiple hypermetabolic peritoneal implants     There was history of extensive family history of breast cancer and patient underwent genetic testing.     Was started on modified FOLFIRINOX with growth factor support every 2 weeks starting January 15, 2024 when cycle 1 was given.     And also has history of chronic leukopenia and neutropenia followed by Dr. Finley     Patient is followed by Dr. Boyer in our office.     Patient was followed by Cecilia Saba on January 17.  She came for unhooking of the chemotherapy.  She has not been eating well.  She just to cut chocolate Ensure.  She called our office January 19 as she has not been eating or drinking.  Not nauseated.  Got admitted because of lack of p.o. intake, severe fatigue nausea weakness and some abdominal discomfort.  Globin on admission was 11.5, white count of 14.85 and platelet count of 142.  Her potassium was 2.8     This morning her hemoglobin is down  to 10.2 with a platelet of 117 and white count of 13 she is afebrile and blood pressure stable at 116 x 62 and oxygen saturation is 100%.  Chest x-ray negative.      Past Medical History:   Diagnosis Date    Arthritis     H/O mammogram 09/01/2015    Dr Lizzy Gary    Hypertension     Lupus     Pancreatic cancer 2023       ONCOLOGIC HISTORY:  (History from previous dates can be found in the separate document.)    No current facility-administered medications on file prior to encounter.     Current Outpatient Medications on File Prior to Encounter   Medication Sig Dispense Refill    amLODIPine (NORVASC) 10 MG tablet Take 1 tablet by mouth Daily.      aspirin 81 MG tablet Take 1 tablet by mouth Daily.      atenolol-chlorthalidone (TENORETIC) 50-25 MG per tablet Take 1 tablet by mouth Daily.      Biotin 5000 MCG tablet Take 1 tablet by mouth daily.      CALCIUM PO Take 2 tablets by mouth daily.      loratadine (CLARITIN) 10 MG tablet Take 1 tablet by mouth Daily.      lovastatin (MEVACOR) 20 MG tablet Take 1 tablet by mouth every night. 90 tablet 3    RESTASIS 0.05 % ophthalmic emulsion Administer 1 drop to both eyes 2 (Two) Times a Day.      betamethasone dipropionate (DIPROLENE) 0.05 % ointment Apply  topically daily as needed. (Patient not taking: Reported on 1/19/2024)      Diclofenac Sodium (VOLTAREN) 1 % gel gel Apply 4 g topically to the appropriate area as directed. (Patient not taking: Reported on 1/19/2024)      lidocaine-prilocaine (EMLA) 2.5-2.5 % cream APPLY NICKEL SIZE AMOUNT DIRECTLY ON PORT SITE 30-60 MINUTES PRIOR TO HAVING PORT ACCESSED. COVER WITH SARAN WRAP. 30 g 1    Omega-3 Fatty Acids (FISH OIL) 1000 MG capsule capsule Take 1 capsule by mouth Daily With Breakfast. (Patient not taking: Reported on 1/19/2024)      ondansetron (ZOFRAN) 8 MG tablet Take 1 tablet by mouth 3 (Three) Times a Day As Needed for Nausea or Vomiting. 30 tablet 5    phenazopyridine (PYRIDIUM) 200 MG tablet  (Patient not taking:  Reported on 1/19/2024)      potassium chloride (K-DUR,KLOR-CON) 20 MEQ CR tablet Take 1 tablet by mouth daily. (Patient not taking: Reported on 1/19/2024) 90 tablet 3    sulfamethoxazole-trimethoprim (BACTRIM DS,SEPTRA DS) 800-160 MG per tablet  (Patient not taking: Reported on 1/19/2024)         ALLERGIES:     Allergies   Allergen Reactions    Ace Inhibitors        Social History     Socioeconomic History    Marital status:     Number of children: 2   Tobacco Use    Smoking status: Never   Vaping Use    Vaping Use: Never used   Substance and Sexual Activity    Alcohol use: No    Drug use: Never    Sexual activity: Defer         Cancer-related family history includes Breast cancer in her mother, sister, and sister; Cancer in her mother and sister.     Review of Systems  A comprehensive 14 point review of systems was performed and was negative except as mentioned.  Patient has abdominal pain    Objective      Vitals:    01/20/24 2034 01/21/24 0409 01/21/24 0414 01/21/24 0812   BP: 139/69 120/75  125/71   BP Location: Right arm Right arm  Right arm   Patient Position: Lying Lying  Lying   Pulse: 74 73  80   Resp: 16 16  16   Temp: 99 °F (37.2 °C) 99.1 °F (37.3 °C)  98.8 °F (37.1 °C)   TempSrc: Oral Oral  Oral   SpO2: 97% 99%  98%   Weight:   56.8 kg (125 lb 3.5 oz)    Height:             1/17/2024     1:16 PM   Current Status   ECOG score 0       Physical Exam    RESPIRATORY:  Normal respiratory effort.  No rales  or wheezing, clear.   CARDIOVASCULAR:  Regular rate and rhythm, no murmur  No significant lower extremity edema.  Abdomen: Soft nontender positive bowel sounds no hepatosplenomegaly  SKIN: No wounds.  No rashes.  MUSCULOSKELETAL/EXTREMITIES: No clubbing or cyanosis.  No apparent unilateral weakness.  NEURO: CN 2-12 appear intact. No focal neurological deficits noted.  PSYCHIATRIC:  Normal judgment and insight.  Normal mood and affect.      RECENT LABS:  Hematology WBC   Date Value Ref Range Status    01/21/2024 9.41 3.40 - 10.80 10*3/mm3 Final     RBC   Date Value Ref Range Status   01/21/2024 3.34 (L) 3.77 - 5.28 10*6/mm3 Final     Hemoglobin   Date Value Ref Range Status   01/21/2024 9.5 (L) 12.0 - 15.9 g/dL Final     Hematocrit   Date Value Ref Range Status   01/21/2024 29.1 (L) 34.0 - 46.6 % Final     Platelets   Date Value Ref Range Status   01/21/2024 91 (L) 140 - 450 10*3/mm3 Final        Assessment & Plan       *.  Admitted with dehydration, decreased p.o. intake, hypokalemia following treatment with FOLFIRINOX on January 15, 2024 by Dr. Boyer.  Cycle 1 given  Very poor intake  Hypotensive when admitted now with normal blood pressure     *.  Anemia and Thrombocytopenia not neutropenic yet    *.  Abdominal pain: 4 or 5 on a 1-10 scale.  We will try IV morphine for pain.  She did have loose stools today.        *Metastatic adenocarcinoma of the pancreas with carcinomatosis  She presented with a several month history of abdominal pain.  11/22/2023: CT abdomen and pelvis with peritoneal carcinomatosis, small volume ascites, abnormal appearance of the body and tail of the pancreas consistent with primary pancreas malignancy with extrapancreatic extension and vascular involvement, shotty retroperitoneal lymphadenopathy, bladder wall thickening, nonocclusive thrombus of the left ovarian vein, noncalcified pulmonary nodules.  11/29/2023: Endoscopic ultrasound with biopsy of the pancreas mass showed adenocarcinoma  12/2/2023: PET CT scan with evident hypermetabolic malignancy in the pancreas body with evidence for peritoneal carcinomatosis with multiple hypermetabolic peritoneal implants, indeterminant small 3 mm right lower lobe lung nodule.  She had a port placed by Dr. Crooks with surgical oncology earlier this week.  On 12/13/2023 she saw Dr. Castillo with medical oncology at [facility].  A clinical trial may be available there.  She has been referred to genetic counseling due to an extensive family history of  breast and other cancers.  She states that she had BRCA testing done a number of years ago that was negative.  It appears that Dr. Castillo is evaluating for mutations otherwise as he evaluates her for candidacy of the clinical trial.  Otherwise he discussed palliative chemotherapy options including modified FOLFIRINOX and Gemzar/Abraxane.  She is also going to see medical oncologist at the [facility] next week as well.  She is seen again on 1/4/2024 having been seen at the [facility].  She is interested in treatment here with our group.  Plan modified FOLFIRINOX with growth factor support due to chronic leukopenia/neutropenia.  Plan modified FOLFIRINOX with growth factor support every 2 weeks at the usual doses.  Oxaliplatin 85 mg/m², irinotecan 150 mg/m², 5-FU 2400 mg/m² over 46 hours.  Neulasta on day 3 if approved by insurance.  Treatment is palliative and the patient understands this.  1/15/2024 cycle 1 FOLFIRINOX        *Chronic leukopenia/neutropenia  Followed by Dr. Marshall at [facility]  ANC is normal  1/4/2024 at 2.04     *Cancer related pain: This is mild at this point we will continue to monitor.  She is not really taking any pain medication for this at this point.     *Anorexia and dysgeusia with weight loss  We will have her see our nutritionist     *Anxiety regarding her health  We discussed potential referral to our supportive oncology program today but she prefers not to schedule this at this time.  We will continue to address this.     *Hypokalemia:  1/15/2024 potassium is 3.0.  She will be given 40 mEq p.o. potassium in the office today.  She also continues on home potassium 20 mEq daily.     Plan  Continue IV fluids  Replenish potassium  So far patient is not neutropenic or febrile  Star nystatin swish  and spitt  Will give morphine 2 mg every 6 hours as needed IV  Tarah Ortiz MD                    Cc:  Aleida Dodd, *

## 2024-01-21 NOTE — PROGRESS NOTES
Name: Eveline Govea ADMIT: 2024   : 1955  PCP: Rosibel Seymour MD    MRN: [id number] LOS: 1 days   AGE/SEX: 68 y.o. female  ROOM: [facility]     Subjective   Subjective   Feels about the same although her mouth feels better.  Having some abdominal pain.       Objective   Objective   Vital Signs  Temp:  [98.6 °F (37 °C)-99.1 °F (37.3 °C)] 98.6 °F (37 °C)  Heart Rate:  [73-82] 82  Resp:  [16] 16  BP: (120-139)/(65-75) 120/65  SpO2:  [97 %-99 %] 98 %  on   ;   Device (Oxygen Therapy): room air  Body mass index is 22.18 kg/m².  Physical Exam  Vitals reviewed.   Constitutional:       General: She is not in acute distress.     Appearance: She is not ill-appearing.   HENT:      Head: Normocephalic and atraumatic.      Mouth/Throat:      Pharynx: Oropharyngeal exudate (tongue) present.   Cardiovascular:      Rate and Rhythm: Normal rate and regular rhythm.   Pulmonary:      Effort: Pulmonary effort is normal.   Abdominal:      General: There is no distension.      Palpations: Abdomen is soft.      Tenderness: There is no abdominal tenderness.   Musculoskeletal:      Right lower leg: No edema.      Left lower leg: No edema.   Skin:     General: Skin is warm and dry.   Neurological:      Mental Status: She is alert and oriented to person, place, and time.   Psychiatric:         Mood and Affect: Mood normal.       Results Review     I reviewed the patient's new clinical results.  Results from last 7 days   Lab Units 24  0624  0704 01/19/24  1737 01/15/24  0759   WBC 10*3/mm3 9.41 13.39*  13.58* 14.85* 3.97   HEMOGLOBIN g/dL 9.5* 10.0*  10.2* 11.5* 11.1*   PLATELETS 10*3/mm3 91* 110*  117* 142 184     Results from last 7 days   Lab Units 24  0619 24  0704 01/19/24  1737 01/15/24  0759   SODIUM mmol/L 132* 134* 133* 140   POTASSIUM mmol/L 3.7 3.9 2.8* 3.0*   CHLORIDE mmol/L 100 99 92* 98   CO2 mmol/L 23.0 19.0* 24.0 29.8*   BUN mg/dL 6* 13 16 20   CREATININE mg/dL 0.53* 0.65  "0.78 0.88   GLUCOSE mg/dL 105* 135* 139* 146*   EGFR mL/min/1.73 100.3 95.4 82.3 71.2     Results from last 7 days   Lab Units 01/21/24  0619 01/19/24  1737 01/15/24  0759   ALBUMIN g/dL 3.2* 3.9 3.8   BILIRUBIN mg/dL 0.2 0.4 0.4   ALK PHOS U/L 73 78 66   AST (SGOT) U/L 21 27 31   ALT (SGPT) U/L 16 23 15     Results from last 7 days   Lab Units 01/21/24  0619 01/20/24  0704 01/19/24  1737 01/15/24  0759   CALCIUM mg/dL 8.0* 8.3* 9.4 9.6   ALBUMIN g/dL 3.2*  --  3.9 3.8   MAGNESIUM mg/dL  --  2.5* 1.5* 1.7       No results found for: \"HGBA1C\", \"POCGLU\"    XR Chest 1 View    Result Date: 1/19/2024  Negative.  This report was finalized on 1/19/2024 6:02 PM by Dr. Cuauhtemoc Juarez M.D on Workstation: [id number]       I have personally reviewed all medications:  Scheduled Medications  amLODIPine, 10 mg, Oral, Daily  aspirin, 81 mg, Oral, Daily  atenolol, 50 mg, Oral, Q24H  atorvastatin, 10 mg, Oral, Daily  cetirizine, 10 mg, Oral, Daily  cycloSPORINE, 1 drop, Both Eyes, BID  nystatin, 5 mL, Swish & Spit, 4x Daily  potassium chloride, 20 mEq, Oral, Daily  senna-docusate sodium, 2 tablet, Oral, BID    Infusions  sodium chloride 0.9 % with KCl 20 mEq, 100 mL/hr, Last Rate: 100 mL/hr (01/21/24 0855)    Diet  Diet: Regular/House Diet; Texture: Regular Texture (IDDSI 7); Fluid Consistency: Thin (IDDSI 0)    I have personally reviewed:  [x]  Laboratory   [x]  Microbiology   [x]  Radiology   []  EKG/Telemetry  []  Cardiology/Vascular   []  Pathology    []  Records       Assessment/Plan     Active Hospital Problems    Diagnosis  POA    **Hypokalemia [E87.6]  Yes    Thrush [B37.0]  Yes    Malignant neoplasm of other parts of pancreas [C25.7]  Yes    Essential hypertension [I10]  Yes    SLE (systemic lupus erythematosus) [M32.9]  Yes      Resolved Hospital Problems   No resolved problems to display.       68 y.o. female with metastatic pancreatic cancer, just started cycle 1 of chemo admitted with Hypokalemia.    Dehydration and " electrolyte abnormalities due to chemo and anorexia.  Continue IV fluids and replacement of electrolytes per protocol.    Continue nystatin swish and spit for thrush    Abdominal pain: Lower dose morphine added by oncology, already had 4 mg dose in chart which had never been requested.  I will discontinue this for now  - Add oral option to use for pain as well    Patient says she did not bring up to oncology the statement she made to me which was that she did not want to be treated anymore for her cancer.  I encouraged her to discuss this with them further tomorrow.  Palliative care has been consulted as well.    Anemia/thrombocytopenia slightly worse today.  Suspect related to chemo.  Continue to follow counts daily.  WBC remains adequate    HTN: Hold chlorthalidone given dehydration.  Likely indefinitely      SCDs for DVT prophylaxis.  Dispo TBD      Wood Pradhan MD  [facility]  01/21/24  16:28 EST

## 2024-01-22 ENCOUNTER — DOCUMENTATION (OUTPATIENT)
Dept: OTHER | Facility: HOSPITAL | Age: 69
End: 2024-01-22
Payer: MEDICARE

## 2024-01-22 LAB
ALBUMIN SERPL-MCNC: 2.8 G/DL (ref 3.5–5.2)
ALBUMIN/GLOB SERPL: 1 G/DL
ALP SERPL-CCNC: 81 U/L (ref 39–117)
ALT SERPL W P-5'-P-CCNC: 11 U/L (ref 1–33)
ANION GAP SERPL CALCULATED.3IONS-SCNC: 12.5 MMOL/L (ref 5–15)
AST SERPL-CCNC: 17 U/L (ref 1–32)
BASOPHILS # BLD MANUAL: 0.08 10*3/MM3 (ref 0–0.2)
BASOPHILS NFR BLD MANUAL: 1 % (ref 0–1.5)
BILIRUB SERPL-MCNC: 0.2 MG/DL (ref 0–1.2)
BUN SERPL-MCNC: 5 MG/DL (ref 8–23)
BUN/CREAT SERPL: 10.9 (ref 7–25)
CALCIUM SPEC-SCNC: 7.9 MG/DL (ref 8.6–10.5)
CHLORIDE SERPL-SCNC: 98 MMOL/L (ref 98–107)
CO2 SERPL-SCNC: 20.5 MMOL/L (ref 22–29)
CREAT SERPL-MCNC: 0.46 MG/DL (ref 0.57–1)
DEPRECATED RDW RBC AUTO: 36 FL (ref 37–54)
EGFRCR SERPLBLD CKD-EPI 2021: 103.7 ML/MIN/1.73
EOSINOPHIL # BLD MANUAL: 0.08 10*3/MM3 (ref 0–0.4)
EOSINOPHIL NFR BLD MANUAL: 1 % (ref 0.3–6.2)
ERYTHROCYTE [DISTWIDTH] IN BLOOD BY AUTOMATED COUNT: 11.9 % (ref 12.3–15.4)
GLOBULIN UR ELPH-MCNC: 2.9 GM/DL
GLUCOSE SERPL-MCNC: 96 MG/DL (ref 65–99)
HCT VFR BLD AUTO: 28.7 % (ref 34–46.6)
HGB BLD-MCNC: 9.6 G/DL (ref 12–15.9)
LYMPHOCYTES # BLD MANUAL: 0.16 10*3/MM3 (ref 0.7–3.1)
LYMPHOCYTES NFR BLD MANUAL: 4 % (ref 5–12)
MCH RBC QN AUTO: 28.5 PG (ref 26.6–33)
MCHC RBC AUTO-ENTMCNC: 33.4 G/DL (ref 31.5–35.7)
MCV RBC AUTO: 85.2 FL (ref 79–97)
MONOCYTES # BLD: 0.33 10*3/MM3 (ref 0.1–0.9)
NEUTROPHILS # BLD AUTO: 7.48 10*3/MM3 (ref 1.7–7)
NEUTROPHILS NFR BLD MANUAL: 91.9 % (ref 42.7–76)
PLAT MORPH BLD: NORMAL
PLATELET # BLD AUTO: 96 10*3/MM3 (ref 140–450)
PMV BLD AUTO: 12 FL (ref 6–12)
POIKILOCYTOSIS BLD QL SMEAR: ABNORMAL
POTASSIUM SERPL-SCNC: 3.7 MMOL/L (ref 3.5–5.2)
PROT SERPL-MCNC: 5.7 G/DL (ref 6–8.5)
RBC # BLD AUTO: 3.37 10*6/MM3 (ref 3.77–5.28)
SODIUM SERPL-SCNC: 131 MMOL/L (ref 136–145)
VARIANT LYMPHS NFR BLD MANUAL: 2 % (ref 19.6–45.3)
WBC MORPH BLD: NORMAL
WBC NRBC COR # BLD AUTO: 8.14 10*3/MM3 (ref 3.4–10.8)

## 2024-01-22 PROCEDURE — 25010000002 SODIUM CHLORIDE 0.9 % WITH KCL 20 MEQ 20-0.9 MEQ/L-% SOLUTION: Performed by: INTERNAL MEDICINE

## 2024-01-22 PROCEDURE — 85027 COMPLETE CBC AUTOMATED: CPT | Performed by: INTERNAL MEDICINE

## 2024-01-22 PROCEDURE — 80053 COMPREHEN METABOLIC PANEL: CPT | Performed by: INTERNAL MEDICINE

## 2024-01-22 PROCEDURE — 25010000002 FLUCONAZOLE PER 200 MG: Performed by: HOSPITALIST

## 2024-01-22 PROCEDURE — 99222 1ST HOSP IP/OBS MODERATE 55: CPT | Performed by: NURSE PRACTITIONER

## 2024-01-22 PROCEDURE — 99497 ADVNCD CARE PLAN 30 MIN: CPT | Performed by: NURSE PRACTITIONER

## 2024-01-22 PROCEDURE — 99232 SBSQ HOSP IP/OBS MODERATE 35: CPT | Performed by: INTERNAL MEDICINE

## 2024-01-22 PROCEDURE — 85007 BL SMEAR W/DIFF WBC COUNT: CPT | Performed by: INTERNAL MEDICINE

## 2024-01-22 RX ORDER — FLUCONAZOLE 2 MG/ML
200 INJECTION, SOLUTION INTRAVENOUS EVERY 24 HOURS
Status: DISCONTINUED | OUTPATIENT
Start: 2024-01-22 | End: 2024-01-24

## 2024-01-22 RX ORDER — DEXTROSE MONOHYDRATE, SODIUM CHLORIDE, AND POTASSIUM CHLORIDE 50; 1.49; 4.5 G/1000ML; G/1000ML; G/1000ML
50 INJECTION, SOLUTION INTRAVENOUS CONTINUOUS
Status: DISCONTINUED | OUTPATIENT
Start: 2024-01-22 | End: 2024-01-25

## 2024-01-22 RX ADMIN — ATORVASTATIN CALCIUM 10 MG: 20 TABLET, FILM COATED ORAL at 08:43

## 2024-01-22 RX ADMIN — POTASSIUM CHLORIDE AND SODIUM CHLORIDE 100 ML/HR: 900; 150 INJECTION, SOLUTION INTRAVENOUS at 07:24

## 2024-01-22 RX ADMIN — DOCUSATE SODIUM 50MG AND SENNOSIDES 8.6MG 2 TABLET: 8.6; 5 TABLET, FILM COATED ORAL at 21:31

## 2024-01-22 RX ADMIN — CYCLOSPORINE 1 DROP: 0.5 EMULSION OPHTHALMIC at 22:22

## 2024-01-22 RX ADMIN — ASPIRIN 81 MG: 81 TABLET, COATED ORAL at 08:43

## 2024-01-22 RX ADMIN — FLUCONAZOLE 200 MG: 200 INJECTION, SOLUTION INTRAVENOUS at 16:26

## 2024-01-22 RX ADMIN — POTASSIUM CHLORIDE, DEXTROSE MONOHYDRATE AND SODIUM CHLORIDE 50 ML/HR: 150; 5; 450 INJECTION, SOLUTION INTRAVENOUS at 15:29

## 2024-01-22 RX ADMIN — ATENOLOL 50 MG: 50 TABLET ORAL at 21:31

## 2024-01-22 RX ADMIN — POTASSIUM CHLORIDE AND SODIUM CHLORIDE 100 ML/HR: 900; 150 INJECTION, SOLUTION INTRAVENOUS at 13:50

## 2024-01-22 RX ADMIN — CYCLOSPORINE 1 DROP: 0.5 EMULSION OPHTHALMIC at 08:45

## 2024-01-22 RX ADMIN — AMLODIPINE BESYLATE 10 MG: 10 TABLET ORAL at 08:43

## 2024-01-22 NOTE — CONSULTS
.            [facility] Palliative Care Services    Palliative Care Initial Consult   Attending Physician: Wood Pradhan*  Referring Provider: Dr Pradhan    Reason for Referral: assistance with advance directives, assistance with clarification of goals of care, and hospice referral or discussion  Family/Support: spouse, children and sister    Code Status and Medical Interventions:   Ordered at: 01/19/24 1941     Code Status (Patient has no pulse and is not breathing):    CPR (Attempt to Resuscitate)     Medical Interventions (Patient has pulse or is breathing):    Full     Goals of Care: TBD.    HPI:   68 y.o. 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 She resided at home prior to admission.   Patient presented to [facility] on 1/19/2024 related to weakness, nausea and poor intake after starting cancer treatment. Workup in ER, revealed hypokalemia, hypotension and anemia/thrombocytopenia. She was started on IV fluids and potassium replaced. Consults were placed for oncology. Of note, she was noted to have oral thrush as well and started on oral nystatin. The palliative care team was consulted for support with goals of care conversation as patient has mentioned considering stopping treatment for pancreatic cancer.   Palliative Care Spoke With: patient  Quality of life: fair  Functional Status: no assistive device   Due to the Palliative Care Topics Discussed: palliative care, goals of care, care options, resuscitation status, [facility], and discharge options we will establish an advance care plan.   Advance Care Planning   Advance Care Planning Discussion: see below         Review of Systems   Constitutional: Positive for decreased appetite and malaise/fatigue.   Cardiovascular:  Negative for chest pain.    Respiratory:  Negative for cough and shortness of breath.    Gastrointestinal:  Positive for abdominal pain, nausea and vomiting.   Neurological:  Positive for weakness.   Psychiatric/Behavioral:  Positive for depression. The patient is not nervous/anxious.        1- Pain Assessment  Pain Location: abdomen  Pain Description: aching    Past Medical History:   Diagnosis Date    Arthritis     H/O mammogram 09/01/2015    Dr Lizzy Gary    Hypertension     Lupus     Pancreatic cancer 2023     Past Surgical History:   Procedure Laterality Date    COLONOSCOPY  11/2014    Dr Rodgers    FOOT SURGERY  01/01/1990    Dr Urbina    HYSTERECTOMY  1999    Dr Amin    TOE SURGERY Bilateral     TUBAL ABDOMINAL LIGATION  1984    Dr Collins     Social History     Socioeconomic History    Marital status:     Number of children: 2   Tobacco Use    Smoking status: Never   Vaping Use    Vaping Use: Never used   Substance and Sexual Activity    Alcohol use: No    Drug use: Never    Sexual activity: Defer       Current Facility-Administered Medications   Medication Dose Route Frequency Provider Last Rate Last Admin    acetaminophen (TYLENOL) tablet 650 mg  650 mg Oral Q4H PRN Aleida Dodd MD        amLODIPine (NORVASC) tablet 10 mg  10 mg Oral Daily Aleida Dodd MD   10 mg at 01/22/24 0843    aspirin EC tablet 81 mg  81 mg Oral Daily Aleida Dodd MD   81 mg at 01/22/24 0843    atenolol (TENORMIN) tablet 50 mg  50 mg Oral Q24H Aleida Dodd MD   50 mg at 01/21/24 2034    atorvastatin (LIPITOR) tablet 10 mg  10 mg Oral Daily Aleida Dodd MD   10 mg at 01/22/24 0843    sennosides-docusate (PERICOLACE) 8.6-50 MG per tablet 2 tablet  2 tablet Oral BID Aleida Dodd MD   2 tablet at 01/20/24 2147    And    polyethylene glycol (MIRALAX) packet 17 g  17 g Oral Daily PRN Aleida Dodd MD        And    bisacodyl (DULCOLAX) EC tablet 5 mg  5 mg Oral Daily PRN Aleida Dodd  MD Dirk        And    bisacodyl (DULCOLAX) suppository 10 mg  10 mg Rectal Daily PRN Aleida Dodd MD        Calcium Replacement - Follow Nurse / BPA Driven Protocol   Does not apply PRN Aleida Dodd MD        cetirizine (zyrTEC) tablet 10 mg  10 mg Oral Daily Aleida Dodd MD   10 mg at 01/20/24 0953    cycloSPORINE (RESTASIS) 0.05 % ophthalmic emulsion 1 drop  1 drop Both Eyes BID Aleida Dodd MD   1 drop at 01/22/24 0845    HYDROcodone-acetaminophen (NORCO) 7.5-325 MG per tablet 1 tablet  1 tablet Oral Q4H PRN Wood Pradhan MD        lidocaine (LMX) 4 % cream   Topical PRN Aleida Dodd MD        Magnesium Standard Dose Replacement - Follow Nurse / BPA Driven Protocol   Does not apply PRN Aleida Dodd MD        melatonin tablet 3 mg  3 mg Oral Nightly PRN Aleida Dodd MD        morphine injection 2 mg  2 mg Intravenous Q4H PRN Tarah Ortiz MD   2 mg at 01/21/24 1240    morphine injection 4 mg  4 mg Intravenous Q4H PRN Aleida Dodd MD        nystatin (MYCOSTATIN) 100,000 unit/mL suspension 500,000 Units  5 mL Swish & Spit 4x Daily Wood Pradhan MD   500,000 Units at 01/21/24 1240    ondansetron ODT (ZOFRAN-ODT) disintegrating tablet 4 mg  4 mg Oral Q6H PRN Aleida Dodd MD        Or    ondansetron (ZOFRAN) injection 4 mg  4 mg Intravenous Q6H PRN Aleida Dodd MD        Phosphorus Replacement - Follow Nurse / BPA Driven Protocol   Does not apply PRN Aleida Dodd MD        potassium chloride (K-DUR,KLOR-CON) ER tablet 20 mEq  20 mEq Oral Daily Aleida Dodd MD        Potassium Replacement - Follow Nurse / BPA Driven Protocol   Does not apply PRN Aleida Dodd MD        sodium chloride 0.9 % flush 10 mL  10 mL Intravenous PRN Santi Valdivia PA        sodium chloride 0.9 % with KCl 20 mEq/L infusion  100 mL/hr Intravenous Continuous Stingl, Aleida Cavazos MD  "100 mL/hr at 01/22/24 0724 100 mL/hr at 01/22/24 0724     sodium chloride 0.9 % with KCl 20 mEq, 100 mL/hr, Last Rate: 100 mL/hr (01/22/24 0724)        acetaminophen    senna-docusate sodium **AND** polyethylene glycol **AND** bisacodyl **AND** bisacodyl    Calcium Replacement - Follow Nurse / BPA Driven Protocol    HYDROcodone-acetaminophen    lidocaine    Magnesium Standard Dose Replacement - Follow Nurse / BPA Driven Protocol    melatonin    Morphine    Morphine    ondansetron ODT **OR** ondansetron    Phosphorus Replacement - Follow Nurse / BPA Driven Protocol    Potassium Replacement - Follow Nurse / BPA Driven Protocol    [COMPLETED] Insert Peripheral IV **AND** sodium chloride    Allergies   Allergen Reactions    Ace Inhibitors      Attest that current medications reviewed  including but not limited to prescriptions, over-the counter, herbals and vitamin/mineral/dietary (nutritional) supplements for name, route of administration, type, dose and frequency and are current using all immediate resources available at time of dictation.      Intake/Output Summary (Last 24 hours) at 1/22/2024 1314  Last data filed at 1/22/2024 0724  Gross per 24 hour   Intake 925 ml   Output --   Net 925 ml       Physical Exam:    Diagnostics: Reviewed  /62 (BP Location: Right arm, Patient Position: Lying)   Pulse 83   Temp 99 °F (37.2 °C) (Oral)   Resp 16   Ht 160 cm (63\")   Wt 57.4 kg (126 lb 8.7 oz)   SpO2 99%   BMI 22.42 kg/m²     Constitutional:       Appearance: Not in distress.   Pulmonary:      Effort: Pulmonary effort is normal.   Cardiovascular:      PMI at left midclavicular line. Normal rate. Regular rhythm.   Edema:     Peripheral edema absent.   Abdominal:      Palpations: Abdomen is soft.      Tenderness: There is abdominal tenderness in the left upper quadrant.   Neurological:      Mental Status: Alert and oriented to person, place, and time.   Psychiatric:         Attention and Perception: Attention and " perception normal.         Mood and Affect: Affect is flat.         Speech: Speech normal.         Behavior: Behavior is cooperative.         Thought Content: Thought content normal.         Cognition and Memory: Cognition normal.         Judgment: Judgment normal.       Patient status: Disease state: Controlled with current treatments.  Functional status: Palliative Performance Scale Score: Performance 60% based on the following measures: Ambulation: Reduced, Activity and Evidence of Disease: Unable to do hobby or some work, significant evidence of disease, Self-Care: Occasional assist necessary,  Intake: Normal or reduced, LOC: Full or confusion   ECOG Status(2) Ambulatory and capable of self care, unable to carry out work activity, up and about > 50% or waking hours.  Nutritional status: Albumin  2.8 on 1/22/2024 Body mass index is 22.42 kg/m².    Family support: The patient receives support from her , children, and extended family..  Advance Directives: Advance Directive Status: Patient does not have advance directive   POA/Healthcare surrogate-n/a.       Impression/Problem List:    Metastatic adenocarcinoma of the pancreas with carcinomatosis      Thrush   Anemia  Thrombocytopenia  Malnutrition   Systemic lupus erythematosus     Hypertension       Recommendations/Plan:  1. Provide support with goals of care        2.  Palliative care encounter  I spoke with patient regarding goals of care. She has very good understanding of her medical conditions, which we reviewed. She was diagnosed with pancreatic cancer in November 2023. She is aware that is not curable. She is very familiar with cancer (breast, bone, lung) in her family, however NOT pancreatic cancer, but understands overall poor prognosis. She had her first treatment for cancer a couple weeks ago and she feels like it made her feel worst than she did before. She is considering stopping treatment, however her family (spouse, children and sister) is  "NOT very supportive and wants her to \"keep fighting.\" She tells me, \"I am the one that is having to go through this, not them.\" We discussed symptom management, palliative care and hospice care in detail. She is considering less aggressive treatment. She does ask that I reach out to her  to arrange a family meeting to support further discussion. We continued to discuss her symptoms, she endorses abdominal pain, nausea, vomiting, poor appetite, fatigue, and depression. We discussed importance of pain management and managing symptoms to provide good quality of life so that she can enjoy  her time. She loves to go to the [employer] and travel. In fact, her mother  in May 2023 and she and her  had plans to travel back to [facility] and [facility]. She reports depression is  new for her and describes as moderate. I inquired if she would consider taking medication for it and she is not sure she wants too. She is of [employer] juan.  She tells me she doesn't like her diagnosis, but accepts it. Of note, she is not sure if she has living will. We did discuss CODE status and medical interventions and she wishes to be a DNR/DNI, but wants to discuss with her spouse first before any changes her made.  She was very appreciative of conversation.I called patient spouseGer at 1423 to arrange a meeting with him tomorrow. There was no answer so I left a message for him to return call. I spoke with Divya KOWALSKI.       Thank you for this consult and allowing us to participate in patient's plan of care. Palliative Care Team will continue to follow patient.     Time spent:60 minutes spent reviewing medical and medication records, assessing and examining patient, discussing with family, answering questions, providing some guidance about a plan and documentation of care, and coordinating care with other healthcare members, with > 50% time spent face to face.   30 minutes spent on advance care planning.    Katie Llanes, " APRN  1/22/2024  13:14 EST          ;

## 2024-01-22 NOTE — PLAN OF CARE
Goal Outcome Evaluation:    A&OX4, calm and cooperative. C/O Pain today requiring PRN pain med Morphine only medication available MD ordered PO pain med. Nystatin swish and spit given. VSS on room air, CTM.

## 2024-01-22 NOTE — PROGRESS NOTES
"Oncology Social Work     OSW visited with patient and her  while in hospital along side nurse navigator Josy KOWALSKI. Patient is questioning treatment decisions and would like palliative to speak to her. Patient does not want anymore chemotherapy as she feels that is what is making her sick. Patient's  reports he wants her \"to keep fighting.\" OSW provided active listening and support. Patient reported her family is aware that she does not want to receive anymore treatment. Patient still feeling anxious and depressed. Patient making states about not wanting to wake up when she goes to sleep. Patient confirmed she is not having SI and does not have a plan. OSW and patient reviewed interventions. Josy KOWALSKI to see if patient's behavioral health oncology appt could be moved up.   OSW available for psychosocial support.     Clarita Machado CSW    "

## 2024-01-22 NOTE — PROGRESS NOTES
Subjective .     REASONS FOR FOLLOWUP:  Metastatic pancreatic cancer     Interval history:  January 21, 2024: Patient is s/p cycle 1 FOLFIRINOX with growth factor support  Hemoglobin 9.5, white count of 9.41 and platelet of 91.  Afebrile    Patient states she is in pain.  Her level of pain is 5 on a 1-10 scale in the abdomen.  We will try morphine PA.  2 mg IV every 4 hours as needed    January 22, 2024: Patient has a low-grade temperature 99 rest of vital signs are stable.  Hemoglobin down to 9.6 white count of 8.14, platelet of 96.  Patient apparently does not want to continue any further chemotherapy as she is feeling worse with chemotherapy.  However patient's  wants her to continue chemotherapy.  Palliative care nurse has been consulted.    HISTORY OF PRESENT ILLNESS:    Patient is a 68-year-old female who has been diagnosed with metastatic adenocarcinoma the pancreas with carcinomatosis.  She was seen by our nurse practitioner Cata on January 15, 2024.  Patient was diagnosed back in November 2023 with peritoneal carcinomatosis.  And had primary pancreatic malignancy with history of pancreatic extension and vascular involvement with shotty retroperitoneal lymphadenopathy.  Patient also had a bladder wall thickening and nonocclusive thrombus of the left ovarian vein and noncalcified pulmonary nodules.     Had shown evidence of peritoneal carcinomatosis with multiple hypermetabolic peritoneal implants     There was history of extensive family history of breast cancer and patient underwent genetic testing.     Was started on modified FOLFIRINOX with growth factor support every 2 weeks starting January 15, 2024 when cycle 1 was given.     And also has history of chronic leukopenia and neutropenia followed by Dr. Finley     Patient is followed by Dr. Boyer in our office.     Patient was followed by Cecilia Saba on January 17.  She came for unhooking of the chemotherapy.  She has not been eating well.   She just to cut chocolate Ensure.  She called our office January 19 as she has not been eating or drinking.  Not nauseated.  Got admitted because of lack of p.o. intake, severe fatigue nausea weakness and some abdominal discomfort.  Globin on admission was 11.5, white count of 14.85 and platelet count of 142.  Her potassium was 2.8     This morning her hemoglobin is down to 10.2 with a platelet of 117 and white count of 13 she is afebrile and blood pressure stable at 116 x 62 and oxygen saturation is 100%.  Chest x-ray negative.      Past Medical History:   Diagnosis Date    Arthritis     H/O mammogram 09/01/2015    Dr Lizzy Gary    Hypertension     Lupus     Pancreatic cancer 2023       ONCOLOGIC HISTORY:  (History from previous dates can be found in the separate document.)    No current facility-administered medications on file prior to encounter.     Current Outpatient Medications on File Prior to Encounter   Medication Sig Dispense Refill    amLODIPine (NORVASC) 10 MG tablet Take 1 tablet by mouth Daily.      aspirin 81 MG tablet Take 1 tablet by mouth Daily.      atenolol-chlorthalidone (TENORETIC) 50-25 MG per tablet Take 1 tablet by mouth Daily.      Biotin 5000 MCG tablet Take 1 tablet by mouth daily.      CALCIUM PO Take 2 tablets by mouth daily.      loratadine (CLARITIN) 10 MG tablet Take 1 tablet by mouth Daily.      lovastatin (MEVACOR) 20 MG tablet Take 1 tablet by mouth every night. 90 tablet 3    RESTASIS 0.05 % ophthalmic emulsion Administer 1 drop to both eyes 2 (Two) Times a Day.      betamethasone dipropionate (DIPROLENE) 0.05 % ointment Apply  topically daily as needed. (Patient not taking: Reported on 1/19/2024)      Diclofenac Sodium (VOLTAREN) 1 % gel gel Apply 4 g topically to the appropriate area as directed. (Patient not taking: Reported on 1/19/2024)      lidocaine-prilocaine (EMLA) 2.5-2.5 % cream APPLY NICKEL SIZE AMOUNT DIRECTLY ON PORT SITE 30-60 MINUTES PRIOR TO HAVING PORT  ACCESSED. COVER WITH SARAN WRAP. 30 g 1    Omega-3 Fatty Acids (FISH OIL) 1000 MG capsule capsule Take 1 capsule by mouth Daily With Breakfast. (Patient not taking: Reported on 1/19/2024)      ondansetron (ZOFRAN) 8 MG tablet Take 1 tablet by mouth 3 (Three) Times a Day As Needed for Nausea or Vomiting. 30 tablet 5    phenazopyridine (PYRIDIUM) 200 MG tablet  (Patient not taking: Reported on 1/19/2024)      potassium chloride (K-DUR,KLOR-CON) 20 MEQ CR tablet Take 1 tablet by mouth daily. (Patient not taking: Reported on 1/19/2024) 90 tablet 3    sulfamethoxazole-trimethoprim (BACTRIM DS,SEPTRA DS) 800-160 MG per tablet  (Patient not taking: Reported on 1/19/2024)         ALLERGIES:     Allergies   Allergen Reactions    Ace Inhibitors        Social History     Socioeconomic History    Marital status:     Number of children: 2   Tobacco Use    Smoking status: Never   Vaping Use    Vaping Use: Never used   Substance and Sexual Activity    Alcohol use: No    Drug use: Never    Sexual activity: Defer         Cancer-related family history includes Breast cancer in her mother, sister, and sister; Cancer in her mother and sister.     Review of Systems  A comprehensive 14 point review of systems was performed and was negative except as mentioned.  Patient has abdominal pain    Objective      Vitals:    01/21/24 1950 01/22/24 0345 01/22/24 0735 01/22/24 1130   BP: 126/71 133/68 133/70 125/62   BP Location: Right arm Right arm Right arm Right arm   Patient Position: Lying Lying Lying Lying   Pulse: 82 80 80 83   Resp: 16 16 16 16   Temp: 99.5 °F (37.5 °C) 99.5 °F (37.5 °C) 99.1 °F (37.3 °C) 99 °F (37.2 °C)   TempSrc: Oral Oral Oral Oral   SpO2: 100% 98% 100% 99%   Weight:  57.4 kg (126 lb 8.7 oz)     Height:             1/17/2024     1:16 PM   Current Status   ECOG score 0       Physical Exam    RESPIRATORY:  Normal respiratory effort.  No rales  or wheezing, clear.   CARDIOVASCULAR:  Regular rate and rhythm, no  murmur  No significant lower extremity edema.  Abdomen: Soft nontender positive bowel sounds no hepatosplenomegaly  SKIN: No wounds.  No rashes.  MUSCULOSKELETAL/EXTREMITIES: No clubbing or cyanosis.  No apparent unilateral weakness.  NEURO: CN 2-12 appear intact. No focal neurological deficits noted.  PSYCHIATRIC:  Normal judgment and insight.  Normal mood and affect.      RECENT LABS:  Hematology WBC   Date Value Ref Range Status   01/22/2024 8.14 3.40 - 10.80 10*3/mm3 Final     RBC   Date Value Ref Range Status   01/22/2024 3.37 (L) 3.77 - 5.28 10*6/mm3 Final     Hemoglobin   Date Value Ref Range Status   01/22/2024 9.6 (L) 12.0 - 15.9 g/dL Final     Hematocrit   Date Value Ref Range Status   01/22/2024 28.7 (L) 34.0 - 46.6 % Final     Platelets   Date Value Ref Range Status   01/22/2024 96 (L) 140 - 450 10*3/mm3 Final        Assessment & Plan       *.  Admitted with dehydration, decreased p.o. intake, hypokalemia following treatment with FOLFIRINOX on January 15, 2024 by Dr. Boyer.  Cycle 1 given  Very poor intake  Hypotensive when admitted now with normal blood pressure  Patient expressed concerns of not continuing any further treatment     *.  Anemia and Thrombocytopenia not neutropenic yet    *.  Abdominal pain: 4 or 5 on a 1-10 scale.  We will try IV morphine for pain.  She did have loose stools today.  Currently on IV morphine as needed        *Metastatic adenocarcinoma of the pancreas with carcinomatosis  She presented with a several month history of abdominal pain.  11/22/2023: CT abdomen and pelvis with peritoneal carcinomatosis, small volume ascites, abnormal appearance of the body and tail of the pancreas consistent with primary pancreas malignancy with extrapancreatic extension and vascular involvement, shotty retroperitoneal lymphadenopathy, bladder wall thickening, nonocclusive thrombus of the left ovarian vein, noncalcified pulmonary nodules.  11/29/2023: Endoscopic ultrasound with biopsy of the  pancreas mass showed adenocarcinoma  12/2/2023: PET CT scan with evident hypermetabolic malignancy in the pancreas body with evidence for peritoneal carcinomatosis with multiple hypermetabolic peritoneal implants, indeterminant small 3 mm right lower lobe lung nodule.  She had a port placed by Dr. Crooks with surgical oncology earlier this week.  On 12/13/2023 she saw Dr. Castillo with medical oncology at [facility].  A clinical trial may be available there.  She has been referred to genetic counseling due to an extensive family history of breast and other cancers.  She states that she had BRCA testing done a number of years ago that was negative.  It appears that Dr. Castillo is evaluating for mutations otherwise as he evaluates her for candidacy of the clinical trial.  Otherwise he discussed palliative chemotherapy options including modified FOLFIRINOX and Gemzar/Abraxane.  She is also going to see medical oncologist at the [facility] next week as well.  She is seen again on 1/4/2024 having been seen at the [facility].  She is interested in treatment here with our group.  Plan modified FOLFIRINOX with growth factor support due to chronic leukopenia/neutropenia.  Plan modified FOLFIRINOX with growth factor support every 2 weeks at the usual doses.  Oxaliplatin 85 mg/m², irinotecan 150 mg/m², 5-FU 2400 mg/m² over 46 hours.  Neulasta on day 3 if approved by insurance.  Treatment is palliative and the patient understands this.  1/15/2024 cycle 1 FOLFIRINOX        *Chronic leukopenia/neutropenia  Followed by Dr. Marshall at [facility]  ANC is normal  1/4/2024 at 2.04     *Cancer related pain: This is mild at this point we will continue to monitor.  She is not really taking any pain medication for this at this point.     *Anorexia and dysgeusia with weight loss  We will have her see our nutritionist     *Anxiety regarding her health  We discussed potential referral to our supportive oncology program  today but she prefers not to schedule this at this time.  We will continue to address this.     *Hypokalemia:  1/15/2024 potassium is 3.0.  She will be given 40 mEq p.o. potassium in the office today.  She also continues on home potassium 20 mEq daily.     Plan  Continue IV fluids  Replenish potassium  So far patient is not neutropenic or febrile  Will discuss with Dr. Boyer, palliative care saw patient today  Will give morphine 2 mg every 6 hours as needed IV    Patient states she prefers not to get chemotherapy though has been wants to.  Will discuss with Dr. Boyer to see if patient can receive FOLFOX instead of FOLFIRINOX.  If she agrees.    Tarah Ortiz MD                    Cc:  Ju, Aleida Cavazos, *

## 2024-01-22 NOTE — PROGRESS NOTES
Name: Eveline Govea ADMIT: 2024   : 1955  PCP: Rosibel Seymour MD    MRN: [id number] LOS: 2 days   AGE/SEX: 68 y.o. female  ROOM: [facility]     Subjective   Subjective   Feels about the same.  Chalky taste in her mouth is better but at the same time she did try to eat some food for lunch and threw it back up.  She did not feel nauseated prior to that.       Objective   Objective   Vital Signs  Temp:  [98.6 °F (37 °C)-99.5 °F (37.5 °C)] 99 °F (37.2 °C)  Heart Rate:  [80-83] 83  Resp:  [16] 16  BP: (120-133)/(62-71) 125/62  SpO2:  [98 %-100 %] 99 %  on   ;   Device (Oxygen Therapy): room air  Body mass index is 22.42 kg/m².  Physical Exam  Vitals reviewed.   Constitutional:       General: She is not in acute distress.     Appearance: She is not ill-appearing.   HENT:      Head: Normocephalic and atraumatic.      Mouth/Throat:      Pharynx: Oropharyngeal exudate (tongue looks better) present.   Cardiovascular:      Rate and Rhythm: Normal rate and regular rhythm.   Pulmonary:      Effort: Pulmonary effort is normal.   Abdominal:      General: There is no distension.      Palpations: Abdomen is soft.      Tenderness: There is no abdominal tenderness.   Musculoskeletal:      Right lower leg: No edema.      Left lower leg: No edema.   Skin:     General: Skin is warm and dry.   Neurological:      Mental Status: She is alert and oriented to person, place, and time.   Psychiatric:         Mood and Affect: Mood normal.       Results Review     I reviewed the patient's new clinical results.  Results from last 7 days   Lab Units 24  0554 24  0619 24  0704 24  1737   WBC 10*3/mm3 8.14 9.41 13.39*  13.58* 14.85*   HEMOGLOBIN g/dL 9.6* 9.5* 10.0*  10.2* 11.5*   PLATELETS 10*3/mm3 96* 91* 110*  117* 142     Results from last 7 days   Lab Units 24  0553 24  0619 24  0704 24  1737   SODIUM mmol/L 131* 132* 134* 133*   POTASSIUM mmol/L 3.7 3.7 3.9 2.8*   CHLORIDE  "mmol/L 98 100 99 92*   CO2 mmol/L 20.5* 23.0 19.0* 24.0   BUN mg/dL 5* 6* 13 16   CREATININE mg/dL 0.46* 0.53* 0.65 0.78   GLUCOSE mg/dL 96 105* 135* 139*   EGFR mL/min/1.73 103.7 100.3 95.4 82.3     Results from last 7 days   Lab Units 01/22/24  0553 01/21/24  0619 01/19/24  1737   ALBUMIN g/dL 2.8* 3.2* 3.9   BILIRUBIN mg/dL 0.2 0.2 0.4   ALK PHOS U/L 81 73 78   AST (SGOT) U/L 17 21 27   ALT (SGPT) U/L 11 16 23     Results from last 7 days   Lab Units 01/22/24  0553 01/21/24  0619 01/20/24  0704 01/19/24  1737   CALCIUM mg/dL 7.9* 8.0* 8.3* 9.4   ALBUMIN g/dL 2.8* 3.2*  --  3.9   MAGNESIUM mg/dL  --   --  2.5* 1.5*       No results found for: \"HGBA1C\", \"POCGLU\"    No radiology results for the last day    I have personally reviewed all medications:  Scheduled Medications  amLODIPine, 10 mg, Oral, Daily  aspirin, 81 mg, Oral, Daily  atenolol, 50 mg, Oral, Q24H  atorvastatin, 10 mg, Oral, Daily  cetirizine, 10 mg, Oral, Daily  cycloSPORINE, 1 drop, Both Eyes, BID  fluconazole, 200 mg, Intravenous, Q24H  potassium chloride, 20 mEq, Oral, Daily  senna-docusate sodium, 2 tablet, Oral, BID    Infusions  sodium chloride 0.9 % with KCl 20 mEq, 100 mL/hr, Last Rate: 100 mL/hr (01/22/24 1350)    Diet  Diet: Regular/House Diet; Texture: Regular Texture (IDDSI 7); Fluid Consistency: Thin (IDDSI 0)    I have personally reviewed:  [x]  Laboratory   []  Microbiology   []  Radiology   []  EKG/Telemetry  []  Cardiology/Vascular   []  Pathology    []  Records       Assessment/Plan     Active Hospital Problems    Diagnosis  POA    **Hypokalemia [E87.6]  Yes    Thrush [B37.0]  Yes    Malignant neoplasm of other parts of pancreas [C25.7]  Yes    Essential hypertension [I10]  Yes    SLE (systemic lupus erythematosus) [M32.9]  Yes      Resolved Hospital Problems   No resolved problems to display.       68 y.o. female with metastatic pancreatic cancer, just started cycle 1 of chemo admitted with Hypokalemia.    Dehydration and electrolyte " abnormalities due to chemo and anorexia.  Still very poor appetite.  Thrush has improved with nystatin but she does not like the taste and does not want to take it anymore.  - Discussed with pharmacy.  Starting fluconazole IV because she has trouble with large pills  - Will decrease rate of fluids but really not eating and drinking much so hesitant to stop them altogether  - Offered appetite stimulant (Remeron) but patient does not want this at this time.    Abdominal pain: Took morphine x 1 yesterday and did not like it.  She has Norco available if needed but says she is not hurting now.      Anemia/thrombocytopenia stable.  Counts may be rebounding some     HTN: Holding chlorthalidone given dehydration.  Likely indefinitely.  BP stable without it      SCDs for DVT prophylaxis.  Dispo: Home when symptomatically better.    Appreciate palliative consultation, meeting with patient and  tomorrow.  Patient does not seem motivated for further treatment       Wood Pradhan MD  [facility]  01/22/24  14:57 EST

## 2024-01-22 NOTE — CASE MANAGEMENT/SOCIAL WORK
Discharge Planning Assessment  [facility]     Patient Name: Eveline Govea  MRN: [id number]  Today's Date: 1/22/2024    Admit Date: 1/19/2024    Plan: Home with family to transport.   Discharge Needs Assessment       Row Name 01/22/24 1557       Living Environment    People in Home spouse    Current Living Arrangements home    Potentially Unsafe Housing Conditions none    In the past 12 months has the electric, gas, oil, or water company threatened to shut off services in your home? No    Primary Care Provided by self    Provides Primary Care For no one    Family Caregiver if Needed spouse    Quality of Family Relationships unable to assess    Able to Return to Prior Arrangements yes       Resource/Environmental Concerns    Resource/Environmental Concerns none    Transportation Concerns none       Transportation Needs    In the past 12 months, has lack of transportation kept you from medical appointments or from getting medications? no    In the past 12 months, has lack of transportation kept you from meetings, work, or from getting things needed for daily living? No       Food Insecurity    Within the past 12 months, you worried that your food would run out before you got the money to buy more. Never true    Within the past 12 months, the food you bought just didn't last and you didn't have money to get more. Never true       Transition Planning    Patient/Family Anticipates Transition to home with family    Patient/Family Anticipated Services at Transition     Transportation Anticipated family or friend will provide       Discharge Needs Assessment    Readmission Within the Last 30 Days no previous admission in last 30 days    Equipment Currently Used at Home none    Concerns to be Addressed denies needs/concerns at this time;discharge planning    Anticipated Changes Related to Illness none    Equipment Needed After Discharge none                   Discharge Plan       Row Name 01/22/24 1558        Plan    Plan Home with family to transport.    Roadmap to Recovery Yes    Patient/Family in Agreement with Plan yes    Provided Post Acute Provider List? N/A    N/A Provider List Comment Patient denies need    Provided Post Acute Provider Quality & Resource List? N/A    N/A Quality & Resource List Comment Patient denies need    Plan Comments Spoke with patient at bedside. Introduced self and explained CCP role. Verified face sheet and local pharmacy is [facility], patient also uses [facility]; patient choice to enroll in [facility]. Patient denies difficulty with medication cost/management. Patient lives with spouse in 2 story home with basement with 4 TONY with rail. Patient denies HH, SNF, and DME history. Patient plans to return home with spouse to transport.                  Continued Care and Services - Admitted Since 1/19/2024    Coordination has not been started for this encounter.       Expected Discharge Date and Time       Expected Discharge Date Expected Discharge Time    Jan 24, 2024            Demographic Summary       Row Name 01/22/24 1552       General Information    Admission Type inpatient    Required Notices Provided Important Message from Medicare    Referral Source admission list    Reason for Consult discharge planning    Preferred Language English       Contact Information    Permission Granted to Share Info With                    Functional Status       Row Name 01/22/24 1557       Functional Status    Usual Activity Tolerance good    Current Activity Tolerance good       Functional Status, IADL    Medications independent    Meal Preparation assistive person    Housekeeping assistive person    Laundry assistive person    Shopping completely dependent       Mental Status    General Appearance WDL WDL       Mental Status Summary    Recent Changes in Mental Status/Cognitive Functioning no changes                   Psychosocial    No documentation.                  Abuse/Neglect    No  documentation.                  Legal    No documentation.                  Substance Abuse    No documentation.                  Patient Forms    No documentation.                     Roxi Hernandez RN

## 2024-01-22 NOTE — PLAN OF CARE
Goal Outcome Evaluation:              Outcome Evaluation: VSS. A&Ox4. Pt refusing pain medication as well as Nystatin swish and spit mouth wash. Fluconazole ordered in replacement of Nystatin mouth wash. Palliative care to have a meeting with the patient and their family tomorrow morning.

## 2024-01-23 PROBLEM — E87.6 HYPOKALEMIA: Status: RESOLVED | Noted: 2024-01-19 | Resolved: 2024-01-23

## 2024-01-23 LAB
ALBUMIN SERPL-MCNC: 3.2 G/DL (ref 3.5–5.2)
ALBUMIN/GLOB SERPL: 1.2 G/DL
ALP SERPL-CCNC: 74 U/L (ref 39–117)
ALT SERPL W P-5'-P-CCNC: 12 U/L (ref 1–33)
ANION GAP SERPL CALCULATED.3IONS-SCNC: 10 MMOL/L (ref 5–15)
AST SERPL-CCNC: 22 U/L (ref 1–32)
BASOPHILS # BLD AUTO: 0.01 10*3/MM3 (ref 0–0.2)
BASOPHILS NFR BLD AUTO: 0.2 % (ref 0–1.5)
BILIRUB SERPL-MCNC: <0.2 MG/DL (ref 0–1.2)
BUN SERPL-MCNC: 3 MG/DL (ref 8–23)
BUN/CREAT SERPL: 5.9 (ref 7–25)
CALCIUM SPEC-SCNC: 8.2 MG/DL (ref 8.6–10.5)
CHLORIDE SERPL-SCNC: 100 MMOL/L (ref 98–107)
CO2 SERPL-SCNC: 23 MMOL/L (ref 22–29)
CREAT SERPL-MCNC: 0.51 MG/DL (ref 0.57–1)
DEPRECATED RDW RBC AUTO: 35.3 FL (ref 37–54)
EGFRCR SERPLBLD CKD-EPI 2021: 101.2 ML/MIN/1.73
EOSINOPHIL # BLD AUTO: 0.17 10*3/MM3 (ref 0–0.4)
EOSINOPHIL NFR BLD AUTO: 3.3 % (ref 0.3–6.2)
ERYTHROCYTE [DISTWIDTH] IN BLOOD BY AUTOMATED COUNT: 11.8 % (ref 12.3–15.4)
GLOBULIN UR ELPH-MCNC: 2.6 GM/DL
GLUCOSE SERPL-MCNC: 144 MG/DL (ref 65–99)
HCT VFR BLD AUTO: 29 % (ref 34–46.6)
HGB BLD-MCNC: 9.6 G/DL (ref 12–15.9)
LYMPHOCYTES # BLD AUTO: 0.67 10*3/MM3 (ref 0.7–3.1)
LYMPHOCYTES NFR BLD AUTO: 13.1 % (ref 19.6–45.3)
MCH RBC QN AUTO: 27.8 PG (ref 26.6–33)
MCHC RBC AUTO-ENTMCNC: 33.1 G/DL (ref 31.5–35.7)
MCV RBC AUTO: 84.1 FL (ref 79–97)
MONOCYTES # BLD AUTO: 0.61 10*3/MM3 (ref 0.1–0.9)
MONOCYTES NFR BLD AUTO: 11.9 % (ref 5–12)
NEUTROPHILS NFR BLD AUTO: 3.61 10*3/MM3 (ref 1.7–7)
NEUTROPHILS NFR BLD AUTO: 70.7 % (ref 42.7–76)
PLATELET # BLD AUTO: 99 10*3/MM3 (ref 140–450)
PMV BLD AUTO: 11.3 FL (ref 6–12)
POTASSIUM SERPL-SCNC: 3.8 MMOL/L (ref 3.5–5.2)
PROT SERPL-MCNC: 5.8 G/DL (ref 6–8.5)
RBC # BLD AUTO: 3.45 10*6/MM3 (ref 3.77–5.28)
SODIUM SERPL-SCNC: 133 MMOL/L (ref 136–145)
WBC NRBC COR # BLD AUTO: 5.11 10*3/MM3 (ref 3.4–10.8)

## 2024-01-23 PROCEDURE — 85027 COMPLETE CBC AUTOMATED: CPT | Performed by: INTERNAL MEDICINE

## 2024-01-23 PROCEDURE — 80053 COMPREHEN METABOLIC PANEL: CPT | Performed by: INTERNAL MEDICINE

## 2024-01-23 PROCEDURE — 99498 ADVNCD CARE PLAN ADDL 30 MIN: CPT | Performed by: NURSE PRACTITIONER

## 2024-01-23 PROCEDURE — 25010000002 FLUCONAZOLE PER 200 MG: Performed by: HOSPITALIST

## 2024-01-23 PROCEDURE — 99232 SBSQ HOSP IP/OBS MODERATE 35: CPT | Performed by: NURSE PRACTITIONER

## 2024-01-23 RX ORDER — MIRTAZAPINE 15 MG/1
15 TABLET, FILM COATED ORAL NIGHTLY
Status: DISCONTINUED | OUTPATIENT
Start: 2024-01-23 | End: 2024-01-24

## 2024-01-23 RX ADMIN — POTASSIUM CHLORIDE, DEXTROSE MONOHYDRATE AND SODIUM CHLORIDE 50 ML/HR: 150; 5; 450 INJECTION, SOLUTION INTRAVENOUS at 06:43

## 2024-01-23 RX ADMIN — ASPIRIN 81 MG: 81 TABLET, COATED ORAL at 08:24

## 2024-01-23 RX ADMIN — MIRTAZAPINE 15 MG: 15 TABLET, FILM COATED ORAL at 21:38

## 2024-01-23 RX ADMIN — CYCLOSPORINE 1 DROP: 0.5 EMULSION OPHTHALMIC at 08:24

## 2024-01-23 RX ADMIN — FLUCONAZOLE 200 MG: 200 INJECTION, SOLUTION INTRAVENOUS at 15:14

## 2024-01-23 RX ADMIN — ATORVASTATIN CALCIUM 10 MG: 20 TABLET, FILM COATED ORAL at 08:24

## 2024-01-23 RX ADMIN — ATENOLOL 50 MG: 50 TABLET ORAL at 21:38

## 2024-01-23 RX ADMIN — HYDROCODONE BITARTRATE AND ACETAMINOPHEN 1 TABLET: 7.5; 325 TABLET ORAL at 03:13

## 2024-01-23 RX ADMIN — CYCLOSPORINE 1 DROP: 0.5 EMULSION OPHTHALMIC at 21:38

## 2024-01-23 RX ADMIN — AMLODIPINE BESYLATE 10 MG: 10 TABLET ORAL at 08:24

## 2024-01-23 NOTE — PROGRESS NOTES
.            [facility]    Palliative Care Daily Progress Note   Chief complaint-follow up goals of care/advanced care planning, support for patient/family, and hospice referral/discussion    Code Status:   Code Status and Medical Interventions:   Ordered at: 01/19/24 1941     Code Status (Patient has no pulse and is not breathing):    CPR (Attempt to Resuscitate)     Medical Interventions (Patient has pulse or is breathing):    Full      Advanced Directives: Advance Directive Status: Patient does not have advance directive   Goals of Care: Ongoing.     S: Medical record reviewed. Events noted.  Patient sitting on side of bed after going to restroom. Reports pain is better. She is tired. Family at bedside. I reviewed labs, medical notes, and MAR. I spoke with DEX Flood.              Review of Systems   Constitutional: Positive for decreased appetite and malaise/fatigue.   Cardiovascular:  Negative for chest pain.   Respiratory:  Negative for shortness of breath.    Gastrointestinal:  Positive for abdominal pain and nausea (intermittent, none at present).   Neurological:  Positive for weakness.   Psychiatric/Behavioral:  Negative for depression. The patient is not nervous/anxious.      Pain Assessment  Preferred Pain Scale: number (Numeric Rating Pain Scale)  Pain Location: abdomen  Pain Description: constant, aching  Pain Onset: recent (weeks)  Pain Duration: weeks  Factors That Aggravate Pain: positioning, palpation  Factors That Relieve Pain: rest, medication timing  Lifestyle Changes/Adaptations in Response to Pain: decreased appetite    O:   No intake or output data in the 24 hours ending 01/23/24 0917    Diagnostics and current medications: Reviewed.    Current Facility-Administered Medications   Medication Dose Route Frequency Provider Last Rate Last Admin    acetaminophen (TYLENOL) tablet 650 mg  650 mg Oral Q4H PRN Aleida Dodd MD        amLODIPine  (NORVASC) tablet 10 mg  10 mg Oral Daily Aleida Dodd MD   10 mg at 01/23/24 0824    aspirin EC tablet 81 mg  81 mg Oral Daily Aleida Dodd MD   81 mg at 01/23/24 0824    atenolol (TENORMIN) tablet 50 mg  50 mg Oral Q24H Aleida Dodd MD   50 mg at 01/22/24 2131    atorvastatin (LIPITOR) tablet 10 mg  10 mg Oral Daily Aleida Dodd MD   10 mg at 01/23/24 0824    sennosides-docusate (PERICOLACE) 8.6-50 MG per tablet 2 tablet  2 tablet Oral BID Aleida Dodd MD   2 tablet at 01/22/24 2131    And    polyethylene glycol (MIRALAX) packet 17 g  17 g Oral Daily PRN Aleida Dodd MD        And    bisacodyl (DULCOLAX) EC tablet 5 mg  5 mg Oral Daily PRN Aleida Dodd MD        And    bisacodyl (DULCOLAX) suppository 10 mg  10 mg Rectal Daily PRN Aleida Dodd MD        Calcium Replacement - Follow Nurse / BPA Driven Protocol   Does not apply PRN Aleida Dodd MD        cetirizine (zyrTEC) tablet 10 mg  10 mg Oral Daily Aleida Dodd MD   10 mg at 01/20/24 0953    cycloSPORINE (RESTASIS) 0.05 % ophthalmic emulsion 1 drop  1 drop Both Eyes BID Aleida Dodd MD   1 drop at 01/23/24 0824    dextrose 5 % and sodium chloride 0.45 % with KCl 20 mEq/L infusion  50 mL/hr Intravenous Continuous Wood Pradhan MD 50 mL/hr at 01/23/24 0643 50 mL/hr at 01/23/24 0643    fluconazole (DIFLUCAN) IVPB 200 mg  200 mg Intravenous Q24H Wood Pradhan  mL/hr at 01/22/24 1626 200 mg at 01/22/24 1626    HYDROcodone-acetaminophen (NORCO) 7.5-325 MG per tablet 1 tablet  1 tablet Oral Q4H PRN Wood Pradhan MD   1 tablet at 01/23/24 0313    lidocaine (LMX) 4 % cream   Topical PRN Stingl, Aleida Cavazos MD        Magnesium Standard Dose Replacement - Follow Nurse / BPA Driven Protocol   Does not apply PRN StingAleida champagne MD        melatonin tablet 3 mg  3 mg Oral Nightly PRN StingAleida champagne MD      "   morphine injection 2 mg  2 mg Intravenous Q4H PRN Tarah Ortiz MD   2 mg at 01/21/24 1240    morphine injection 4 mg  4 mg Intravenous Q4H PRN Aleida Dodd MD        ondansetron ODT (ZOFRAN-ODT) disintegrating tablet 4 mg  4 mg Oral Q6H PRN Aleida Dodd MD        Or    ondansetron (ZOFRAN) injection 4 mg  4 mg Intravenous Q6H PRN Aleida Dodd MD        Phosphorus Replacement - Follow Nurse / BPA Driven Protocol   Does not apply PRN StingAleida champagne MD        potassium chloride (K-DUR,KLOR-CON) ER tablet 20 mEq  20 mEq Oral Daily Aleida Dodd MD        Potassium Replacement - Follow Nurse / BPA Driven Protocol   Does not apply PRN Aleida Dodd MD        sodium chloride 0.9 % flush 10 mL  10 mL Intravenous PRN Santi Valdivia PA         dextrose 5 % and sodium chloride 0.45 % with KCl 20 mEq/L, 50 mL/hr, Last Rate: 50 mL/hr (01/23/24 0643)        acetaminophen    senna-docusate sodium **AND** polyethylene glycol **AND** bisacodyl **AND** bisacodyl    Calcium Replacement - Follow Nurse / BPA Driven Protocol    HYDROcodone-acetaminophen    lidocaine    Magnesium Standard Dose Replacement - Follow Nurse / BPA Driven Protocol    melatonin    Morphine    Morphine    ondansetron ODT **OR** ondansetron    Phosphorus Replacement - Follow Nurse / BPA Driven Protocol    Potassium Replacement - Follow Nurse / BPA Driven Protocol    [COMPLETED] Insert Peripheral IV **AND** sodium chloride  Attest that current medications reviewed including but not limited to prescriptions, over-the counter, herbals and vitamin/mineral/dietary (nutritional) supplements for name, route of administration, type, dose and frequency and are current using all immediate resources available at time of dictation.    A:    /68 (BP Location: Right arm, Patient Position: Lying)   Pulse 72   Temp 98.4 °F (36.9 °C) (Oral)   Resp 16   Ht 160 cm (63\")   Wt 57.6 kg (126 lb 15.8 oz)   SpO2 " 99%   BMI 22.49 kg/m²     Constitutional:       Appearance: Not in distress.   Pulmonary:      Effort: Pulmonary effort is normal.   Cardiovascular:      PMI at left midclavicular line.   Edema:     Peripheral edema absent.   Neurological:      Mental Status: Alert and oriented to person, place, and time.   Psychiatric:         Mood and Affect: Mood and affect normal.         Speech: Speech normal.         Behavior: Behavior is cooperative.         Thought Content: Thought content normal.         Cognition and Memory: Cognition normal.         Judgment: Judgment normal.         Patient status: Disease state: Controlled with current treatments.  Functional status: Palliative Performance Scale Score: Performance 60% based on the following measures: Ambulation: Reduced, Activity and Evidence of Disease: Unable to do hobby or some work, significant evidence of disease, Self-Care: Occasional assist necessary,  Intake: Normal or reduced, LOC: Full or confusion   ECOG Status(2) Ambulatory and capable of self care, unable to carry out work activity, up and about > 50% or waking hours.  Nutritional status: Albumin  2.8 on 1/22/2024 Body mass index is 22.42 kg/m².    Family support: The patient receives support from her , children, and extended family..  Advance Directives: Advance Directive Status: Patient does not have advance directive   POA/Healthcare surrogate-n/a.     Impression/Problem List:     Metastatic adenocarcinoma of the pancreas with carcinomatosis      Thrush   Anemia  Thrombocytopenia  Malnutrition   Systemic lupus erythematosus     Hypertension        Recommendations/Plan:  1. Provide support with goals of care           2.  Palliative care encounter  1/22/2024- I spoke with patient regarding goals of care. She has very good understanding of her medical conditions, which we reviewed. She was diagnosed with pancreatic cancer in November 2023. She is aware that is not curable. She is very familiar with  "cancer (breast, bone, lung) in her family, however NOT pancreatic cancer, but understands overall poor prognosis. She had her first treatment for cancer a couple weeks ago and she feels like it made her feel worst than she did before. She is considering stopping treatment, however her family (spouse, children and sister) is NOT very supportive and wants her to \"keep fighting.\" She tells me, \"I am the one that is having to go through this, not them.\" We discussed symptom management, palliative care and hospice care in detail. She is considering less aggressive treatment. She does ask that I reach out to her  to arrange a family meeting to support further discussion. We continued to discuss her symptoms, she endorses abdominal pain, nausea, vomiting, poor appetite, fatigue, and depression. We discussed importance of pain management and managing symptoms to provide good quality of life so that she can enjoy  her time. She loves to go to the [facility] and travel. In fact, her mother  in May 2023 and she and her  had plans to travel back to [facility] and [facility]. She reports depression is  new for her and describes as moderate. I inquired if she would consider taking medication for it and she is not sure she wants too. She is of [employer] juan.  She tells me she doesn't like her diagnosis, but accepts it. Of note, she is not sure if she has living will. We did discuss CODE status and medical interventions and she wishes to be a DNR/DNI, but wants to discuss with her spouse first before any changes her made.  She was very appreciative of conversation.I called patient spouse, Ger at 1423 to arrange a meeting with him tomorrow. There was no answer so I left a message for him to return call. I spoke with Divya KOWALSKI.     2024- I had follow up conversation with patient, to include her spouse, Ger and sister, Ratna. They all have a good understanding of her medical conditions and understand its  NOT " curable. We reviewed patient goals. She remains fairly consistent with NOT wanting to proceed with chemotherapy for pancreatic cancer. However,  she will wait to discuss further with Dr Boyer this upcoming Monday. I provided them all with information regarding [facility] vs [facility]. They will review and let either let myself know or Dr Boyer office when she follows up with them. We discussed grief in detail. Both Ger and Ratna express their concerns with the patient decision of NOT wanting to continue, but understand and appear somewhat empathetic. Of note, Ratna has undergone chemotherapy for breast cancer and has some ideas to side effects, etc. We reviewed CODE status and medical interventions and Eveline tells them she doesn't want to be have CPR and be on ventilator knowing she has cancer and knows she will die  eventually because of it. Her   is reluctant and in fact states I will do CPR on her if she is at home. I have encouraged them to have further conversation and encouraged Ger to respect the patient wishes. They plan to discuss further amongst themselves. At this time the patient is considering just focusing on symptom control, with hopes of feeling somewhat better so that she can travel and enjoy what time she has. She wait to discuss with Dr Boyer next week.  They were very appreciative of conversation. I spoke with DEX Flood.         Thank you for allowing us to participate in patient's plan of care. Palliative Care Team will sign off.     Time spent:45 minutes spent reviewing medical and medication records, assessing and examining patient, discussing with family, answering questions, providing some guidance about a plan and documentation of care, and coordinating care with other healthcare members, with > 50% time spent face to face.   30 minutes spent on advance care planning.    Katie Llanes, APRN  1/23/2024  09:17 EST

## 2024-01-23 NOTE — PROGRESS NOTES
Name: Eveline Govea ADMIT: 2024   : 1955  PCP: Rosibel Seymour MD    MRN: [id number] LOS: 3 days   AGE/SEX: 68 y.o. female  ROOM: [facility]     Subjective   Subjective   Feels a little better. Ate a little turkey and ham for lunch and it tasted good.    Pain was better with oral meds       Objective   Objective   Vital Signs  Temp:  [98.4 °F (36.9 °C)-99.7 °F (37.6 °C)] 98.4 °F (36.9 °C)  Heart Rate:  [72-86] 86  Resp:  [16] 16  BP: (116-135)/(68-74) 129/74  SpO2:  [97 %-100 %] 100 %  on   ;   Device (Oxygen Therapy): room air  Body mass index is 22.49 kg/m².  Physical Exam  Vitals reviewed.   Constitutional:       General: She is not in acute distress.     Appearance: She is not ill-appearing.   HENT:      Head: Normocephalic and atraumatic.   Cardiovascular:      Rate and Rhythm: Normal rate and regular rhythm.   Pulmonary:      Effort: Pulmonary effort is normal.   Abdominal:      General: There is no distension.      Palpations: Abdomen is soft.      Tenderness: There is no abdominal tenderness.   Musculoskeletal:      Right lower leg: No edema.      Left lower leg: No edema.   Skin:     General: Skin is warm and dry.   Neurological:      Mental Status: She is alert and oriented to person, place, and time.   Psychiatric:         Mood and Affect: Mood normal.       Results Review     I reviewed the patient's new clinical results.  Results from last 7 days   Lab Units 24  0636 24  0554 24  0624  0704   WBC 10*3/mm3 5.11 8.14 9.41 13.39*  13.58*   HEMOGLOBIN g/dL 9.6* 9.6* 9.5* 10.0*  10.2*   PLATELETS 10*3/mm3 99* 96* 91* 110*  117*     Results from last 7 days   Lab Units 24  0636 24  0553 24  0619 24  0704   SODIUM mmol/L 133* 131* 132* 134*   POTASSIUM mmol/L 3.8 3.7 3.7 3.9   CHLORIDE mmol/L 100 98 100 99   CO2 mmol/L 23.0 20.5* 23.0 19.0*   BUN mg/dL 3* 5* 6* 13   CREATININE mg/dL 0.51* 0.46* 0.53* 0.65   GLUCOSE mg/dL 144* 96 105*  "135*   EGFR mL/min/1.73 101.2 103.7 100.3 95.4     Results from last 7 days   Lab Units 01/23/24  0636 01/22/24  0553 01/21/24  0619 01/19/24  1737   ALBUMIN g/dL 3.2* 2.8* 3.2* 3.9   BILIRUBIN mg/dL <0.2 0.2 0.2 0.4   ALK PHOS U/L 74 81 73 78   AST (SGOT) U/L 22 17 21 27   ALT (SGPT) U/L 12 11 16 23     Results from last 7 days   Lab Units 01/23/24  0636 01/22/24  0553 01/21/24  0619 01/20/24  0704 01/19/24  1737   CALCIUM mg/dL 8.2* 7.9* 8.0* 8.3* 9.4   ALBUMIN g/dL 3.2* 2.8* 3.2*  --  3.9   MAGNESIUM mg/dL  --   --   --  2.5* 1.5*       No results found for: \"HGBA1C\", \"POCGLU\"    No radiology results for the last day    I have personally reviewed all medications:  Scheduled Medications  amLODIPine, 10 mg, Oral, Daily  aspirin, 81 mg, Oral, Daily  atenolol, 50 mg, Oral, Q24H  atorvastatin, 10 mg, Oral, Daily  cetirizine, 10 mg, Oral, Daily  cycloSPORINE, 1 drop, Both Eyes, BID  fluconazole, 200 mg, Intravenous, Q24H  mirtazapine, 15 mg, Oral, Nightly  potassium chloride, 20 mEq, Oral, Daily  senna-docusate sodium, 2 tablet, Oral, BID    Infusions  dextrose 5 % and sodium chloride 0.45 % with KCl 20 mEq/L, 50 mL/hr, Last Rate: 50 mL/hr (01/23/24 0643)    Diet  Diet: Regular/House Diet; Texture: Regular Texture (IDDSI 7); Fluid Consistency: Thin (IDDSI 0)    I have personally reviewed:  [x]  Laboratory   []  Microbiology   []  Radiology   []  EKG/Telemetry  []  Cardiology/Vascular   []  Pathology    []  Records       Assessment/Plan     Active Hospital Problems    Diagnosis  POA    Thrush [B37.0]  Yes    Malignant neoplasm of other parts of pancreas [C25.7]  Yes    Essential hypertension [I10]  Yes    SLE (systemic lupus erythematosus) [M32.9]  Yes      Resolved Hospital Problems    Diagnosis Date Resolved POA    **Hypokalemia [E87.6] 01/23/2024 Yes       68 y.o. female with metastatic pancreatic cancer, just started cycle 1 of chemo admitted with Hypokalemia.    Dehydration and electrolyte abnormalities due to " chemo and anorexia.  Still very poor appetite.    -Now willing to try Remeron per nutritionist.  I have ordered it  - Likely DC fluids in a.m. if appetite continues to improve    Thrush improving.  Continue fluconazole IV as patient poorly tolerant of nystatin.      Abdominal pain: Better control with Norco as needed.      Anemia/thrombocytopenia stable.  Counts may be rebounding some     HTN: Holding chlorthalidone given dehydration.  Likely indefinitely.  BP stable without it      SCDs for DVT prophylaxis.  Dispo: Likely home soon, potentially tomorrow if eating better  Appreciate palliative consultation, note reviewed      Wood Pradhan MD  [facility]  01/23/24  16:21 EST

## 2024-01-23 NOTE — PROGRESS NOTES
Subjective .     REASONS FOR FOLLOWUP:  Metastatic pancreatic cancer     Interval history:  January 21, 2024: Patient is s/p cycle 1 FOLFIRINOX with growth factor support  Hemoglobin 9.5, white count of 9.41 and platelet of 91.  Afebrile    Patient states she is in pain.  Her level of pain is 5 on a 1-10 scale in the abdomen.  We will try morphine PA.  2 mg IV every 4 hours as needed    January 22, 2024: Patient has a low-grade temperature 99 rest of vital signs are stable.  Hemoglobin down to 9.6 white count of 8.14, platelet of 96.  Patient apparently does not want to continue any further chemotherapy as she is feeling worse with chemotherapy.  However patient's  wants her to continue chemotherapy.  Palliative care nurse has been consulted.     January 23, 2024: Palliative care nurse met with patient.  Patient is not interested to continue any further treatment.  But her  and children prefer her to have treatment.  She also wants to discuss resuscitation that status with her  she prefers to be DNR  Able CBC with a white count of 5.11 hemoglobin of 9.6 and a platelet of 99.  Will need to discuss with Dr. Boyer.  If patient is discharged she can follow-up with Dr. Boyer    January 24, 2024: Patient is afebrile, vital signs stable.  Nausea improved.  Pain 5 out of 10 on the left flank and was refusing to take pain meds.  I discussed with patient and patient's .  Both are in agreement for no resuscitation.  And patient has made it very clear that she does not want any chemotherapy.  Discussion done with Dr. Boyer and he is in agreement with that.  Will consult hospitalist    HISTORY OF PRESENT ILLNESS:    Patient is a 68-year-old female who has been diagnosed with metastatic adenocarcinoma the pancreas with carcinomatosis.  She was seen by our nurse practitioner Cata on January 15, 2024.  Patient was diagnosed back in November 2023 with peritoneal carcinomatosis.  And had primary  pancreatic malignancy with history of pancreatic extension and vascular involvement with shotty retroperitoneal lymphadenopathy.  Patient also had a bladder wall thickening and nonocclusive thrombus of the left ovarian vein and noncalcified pulmonary nodules.     Had shown evidence of peritoneal carcinomatosis with multiple hypermetabolic peritoneal implants     There was history of extensive family history of breast cancer and patient underwent genetic testing.     Was started on modified FOLFIRINOX with growth factor support every 2 weeks starting January 15, 2024 when cycle 1 was given.     And also has history of chronic leukopenia and neutropenia followed by Dr. Finley     Patient is followed by Dr. Boyer in our office.     Patient was followed by Cecilia Saba on January 17.  She came for unhooking of the chemotherapy.  She has not been eating well.  She just to cut chocolate Ensure.  She called our office January 19 as she has not been eating or drinking.  Not nauseated.  Got admitted because of lack of p.o. intake, severe fatigue nausea weakness and some abdominal discomfort.  Globin on admission was 11.5, white count of 14.85 and platelet count of 142.  Her potassium was 2.8     This morning her hemoglobin is down to 10.2 with a platelet of 117 and white count of 13 she is afebrile and blood pressure stable at 116 x 62 and oxygen saturation is 100%.  Chest x-ray negative.      Past Medical History:   Diagnosis Date    Arthritis     H/O mammogram 09/01/2015    Dr Lizzy Gary    Hypertension     Lupus     Pancreatic cancer 2023       ONCOLOGIC HISTORY:  (History from previous dates can be found in the separate document.)    No current facility-administered medications on file prior to encounter.     Current Outpatient Medications on File Prior to Encounter   Medication Sig Dispense Refill    amLODIPine (NORVASC) 10 MG tablet Take 1 tablet by mouth Daily.      aspirin 81 MG tablet Take 1 tablet by mouth Daily.       atenolol-chlorthalidone (TENORETIC) 50-25 MG per tablet Take 1 tablet by mouth Daily.      Biotin 5000 MCG tablet Take 1 tablet by mouth daily.      CALCIUM PO Take 2 tablets by mouth daily.      loratadine (CLARITIN) 10 MG tablet Take 1 tablet by mouth Daily.      lovastatin (MEVACOR) 20 MG tablet Take 1 tablet by mouth every night. 90 tablet 3    RESTASIS 0.05 % ophthalmic emulsion Administer 1 drop to both eyes 2 (Two) Times a Day.      betamethasone dipropionate (DIPROLENE) 0.05 % ointment Apply  topically daily as needed. (Patient not taking: Reported on 1/19/2024)      Diclofenac Sodium (VOLTAREN) 1 % gel gel Apply 4 g topically to the appropriate area as directed. (Patient not taking: Reported on 1/19/2024)      lidocaine-prilocaine (EMLA) 2.5-2.5 % cream APPLY NICKEL SIZE AMOUNT DIRECTLY ON PORT SITE 30-60 MINUTES PRIOR TO HAVING PORT ACCESSED. COVER WITH SARAN WRAP. 30 g 1    Omega-3 Fatty Acids (FISH OIL) 1000 MG capsule capsule Take 1 capsule by mouth Daily With Breakfast. (Patient not taking: Reported on 1/19/2024)      ondansetron (ZOFRAN) 8 MG tablet Take 1 tablet by mouth 3 (Three) Times a Day As Needed for Nausea or Vomiting. 30 tablet 5    phenazopyridine (PYRIDIUM) 200 MG tablet  (Patient not taking: Reported on 1/19/2024)      potassium chloride (K-DUR,KLOR-CON) 20 MEQ CR tablet Take 1 tablet by mouth daily. (Patient not taking: Reported on 1/19/2024) 90 tablet 3    sulfamethoxazole-trimethoprim (BACTRIM DS,SEPTRA DS) 800-160 MG per tablet  (Patient not taking: Reported on 1/19/2024)         ALLERGIES:     Allergies   Allergen Reactions    Ace Inhibitors        Social History     Socioeconomic History    Marital status:     Number of children: 2   Tobacco Use    Smoking status: Never   Vaping Use    Vaping Use: Never used   Substance and Sexual Activity    Alcohol use: No    Drug use: Never    Sexual activity: Defer         Cancer-related family history includes Breast cancer in her  mother, sister, and sister; Cancer in her mother and sister.     Review of Systems  A comprehensive 14 point review of systems was performed and was negative except as mentioned.  Patient has abdominal pain    Objective      Vitals:    01/23/24 1941 01/24/24 0432 01/24/24 0744 01/24/24 1144   BP: 122/69 129/78 125/77 114/69   BP Location: Left arm Right arm Right arm Right arm   Patient Position: Lying Lying Lying Lying   Pulse: 80 74 78 80   Resp: 16 16 18 18   Temp: 98.4 °F (36.9 °C) 99 °F (37.2 °C) 98.6 °F (37 °C) 98.6 °F (37 °C)   TempSrc: Oral Oral Oral Oral   SpO2: 99% 98% 98% 97%   Weight:  56.4 kg (124 lb 5.4 oz)     Height:             1/17/2024     1:16 PM   Current Status   ECOG score 0       Physical Exam    RESPIRATORY:  Normal respiratory effort.  No rales  or wheezing, clear.   CARDIOVASCULAR:  Regular rate and rhythm, no murmur  No significant lower extremity edema.  Abdomen: Soft nontender positive bowel sounds no hepatosplenomegaly  SKIN: No wounds.  No rashes.  MUSCULOSKELETAL/EXTREMITIES: No clubbing or cyanosis.  No apparent unilateral weakness.  NEURO: CN 2-12 appear intact. No focal neurological deficits noted.  PSYCHIATRIC:  Normal judgment and insight.  Normal mood and affect.      RECENT LABS:  Hematology WBC   Date Value Ref Range Status   01/24/2024 5.20 3.40 - 10.80 10*3/mm3 Final     RBC   Date Value Ref Range Status   01/24/2024 3.40 (L) 3.77 - 5.28 10*6/mm3 Final     Hemoglobin   Date Value Ref Range Status   01/24/2024 9.7 (L) 12.0 - 15.9 g/dL Final     Hematocrit   Date Value Ref Range Status   01/24/2024 29.0 (L) 34.0 - 46.6 % Final     Platelets   Date Value Ref Range Status   01/24/2024 120 (L) 140 - 450 10*3/mm3 Final        Assessment & Plan       *.  Admitted with dehydration, decreased p.o. intake, hypokalemia following treatment with FOLFIRINOX on January 15, 2024 by Dr. Boyer.  Cycle 1 given  Very poor intake  Hypotensive when admitted now with normal blood pressure  Patient  expressed concerns of not continuing any further treatment  Palliative care met with her.  She is going to discuss with her family and decide on the resuscitation status and also prefers not to  January 24, 2024: Nausea improved.  Pain 5 out of 10, has been refusing pain meds.  Has been wanting to stop chemotherapy but will encourage to consider less aggressive chemo as family really wants to try.  Will discuss with family in length     *.  Anemia and Thrombocytopenia not neutropenic yet    *.  Abdominal pain: 4 or 5 on a 1-10 scale.  We will try IV morphine for pain.  She did have loose stools today.  Currently on IV morphine as needed        *Metastatic adenocarcinoma of the pancreas with carcinomatosis  She presented with a several month history of abdominal pain.  11/22/2023: CT abdomen and pelvis with peritoneal carcinomatosis, small volume ascites, abnormal appearance of the body and tail of the pancreas consistent with primary pancreas malignancy with extrapancreatic extension and vascular involvement, shotty retroperitoneal lymphadenopathy, bladder wall thickening, nonocclusive thrombus of the left ovarian vein, noncalcified pulmonary nodules.  11/29/2023: Endoscopic ultrasound with biopsy of the pancreas mass showed adenocarcinoma  12/2/2023: PET CT scan with evident hypermetabolic malignancy in the pancreas body with evidence for peritoneal carcinomatosis with multiple hypermetabolic peritoneal implants, indeterminant small 3 mm right lower lobe lung nodule.  She had a port placed by Dr. Crooks with surgical oncology earlier this week.  On 12/13/2023 she saw Dr. Castillo with medical oncology at [facility].  A clinical trial may be available there.  She has been referred to genetic counseling due to an extensive family history of breast and other cancers.  She states that she had BRCA testing done a number of years ago that was negative.  It appears that Dr. Castillo is evaluating for mutations otherwise as he  evaluates her for candidacy of the clinical trial.  Otherwise he discussed palliative chemotherapy options including modified FOLFIRINOX and Gemzar/Abraxane.  She is also going to see medical oncologist at the [facility] next week as well.  She is seen again on 1/4/2024 having been seen at the [facility].  She is interested in treatment here with our group.  Plan modified FOLFIRINOX with growth factor support due to chronic leukopenia/neutropenia.  Plan modified FOLFIRINOX with growth factor support every 2 weeks at the usual doses.  Oxaliplatin 85 mg/m², irinotecan 150 mg/m², 5-FU 2400 mg/m² over 46 hours.  Neulasta on day 3 if approved by insurance.  Treatment is palliative and the patient understands this.  1/15/2024 cycle 1 FOLFIRINOX  January 24, 2024: Patient is gradually improving with nausea.  Patient still complains of pain in the left lower quadrant which is a 5 out of 10.  She continues to take Norco.  She has made it very clear that she does not want any further chemotherapy.  Her  is in agreement with that.  Patient is DNR and had lengthy discussion with both patient and .  Discussed with Dr. Boyer and is in agreement that her prognosis is not the best and it is okay for home hospice          *Chronic leukopenia/neutropenia  Followed by Dr. Marshall at [place]  ANC is normal  1/4/2024 at 2.04  January 24, 2024: WBC 5.2, hemoglobin 9.7, platelet 120     *Cancer related pain: This is mild at this point we will continue to monitor.  She is not really taking any pain medication for this at this point.  Has been refusing pain meds, will encourage     *Anorexia and dysgeusia with weight loss  We will have her see our nutritionist     *Anxiety regarding her health  We discussed potential referral to our supportive oncology program today but she prefers not to schedule this at this time.  We will continue to address this.     *Hypokalemia:  1/15/2024 potassium is 3.0.  She  will be given 40 mEq p.o. potassium in the office today.  She also continues on home potassium 20 mEq daily.     Plan  Continue IV fluids  Replenish potassium  So far patient is not neutropenic or febrile  Continue Norco for pain  Palliative care has seen patient  Patient refuses any further chemotherapy  Will refer to hospice so that patient can go home with hospice    Tarah Ortiz MD                    Cc:  Aleida Dodd, *

## 2024-01-23 NOTE — PLAN OF CARE
Goal Outcome Evaluation:           Progress: no change  Outcome Evaluation: VSS. A&Ox4. No c/o pain or nausea this shift. Fluconazole given per order. Palliative care nurse spoke with patient and family this am. No changes made to patients plan of care at this time.

## 2024-01-23 NOTE — PROGRESS NOTES
"Nutrition Services    Patient Name:  Eveline Govea  YOB: 1955  MRN: [id number]  Admit Date:  1/19/2024  Assessment Date:  01/23/24    Summary: Nutrition follow up    Nutrition follow up completed. Visited pt at bedside. Still with poor appetite, eating 25% PO per EMR. She endorses having taste changes since undergoing chemo. She dislikes the Ensures, will discontinue. Agreeable to try Boost Breeze. Discussed option of appetite stimulant with pt with which she was agreeable to try, notified MD. Palliative met with pt to discuss GOC/POC. Pt is considering stopping treatment and does not want to proceed with chemo for her pancreatic cancer per APRN note.   Labs: Na 133, Glu 144, BUN 3, Creat 0.51, Platelets 99    Plan/Recommendation  Boost Breeze mixed berry BID B/D for additional calories/protein and to support adequate PO intake  Will CTM PO/ONS intake and wt   Appetite stimulant     RD to follow per protocol.    CLINICAL NUTRITION ASSESSMENT      Reason for Assessment Follow up protocol      Diagnosis/Problem    Metastatic adenocarcinoma of the pancreas    Medical/Surgical History Past Medical History:   Diagnosis Date    Arthritis     H/O mammogram 09/01/2015    Dr Lizzy Gary    Hypertension     Lupus     Pancreatic cancer 2023       Past Surgical History:   Procedure Laterality Date    COLONOSCOPY  11/2014    Dr Rodgers    FOOT SURGERY  01/01/1990    Dr Urbina    HYSTERECTOMY  1999    Dr Amin    TOE SURGERY Bilateral     TUBAL ABDOMINAL LIGATION  1984    Dr Collins        Anthropometrics        Current Height  Current Weight  BMI kg/m2 Height: 160 cm (63\")  Weight: 57.6 kg (126 lb 15.8 oz) (01/23/24 0222)  Body mass index is 22.49 kg/m².   Adjusted BMI (if applicable)    BMI Category Normal/Healthy (18.4 - 24.9)   Ideal Body Weight (IBW) 115   Usual Body Weight (UBW) 130's   Weight Trend Loss, Gain   Weight History Wt Readings from Last 30 Encounters:   01/23/24 0222 57.6 kg (126 lb 15.8 oz) "   01/22/24 0345 57.4 kg (126 lb 8.7 oz)   01/21/24 0414 56.8 kg (125 lb 3.5 oz)   01/20/24 0337 55.4 kg (122 lb 2.2 oz)   01/19/24 2100 55.4 kg (122 lb 2.2 oz)   01/19/24 1707 55.3 kg (122 lb)   01/17/24 1316 56.8 kg (125 lb 3.2 oz)   01/15/24 0815 56.9 kg (125 lb 8 oz)   01/11/24 1315 56.5 kg (124 lb 9.6 oz)   01/04/24 1027 57.1 kg (125 lb 14.4 oz)   12/14/23 0915 59.4 kg (130 lb 14.4 oz)   10/12/16 1131 62.6 kg (138 lb)   08/15/16 1632 63 kg (139 lb)   11/03/14 1714 65.3 kg (144 lb 0.1 oz)      --  Labs       Pertinent Labs    Results from last 7 days   Lab Units 01/23/24  0636 01/22/24  0553 01/21/24  0619   SODIUM mmol/L 133* 131* 132*   POTASSIUM mmol/L 3.8 3.7 3.7   CHLORIDE mmol/L 100 98 100   CO2 mmol/L 23.0 20.5* 23.0   BUN mg/dL 3* 5* 6*   CREATININE mg/dL 0.51* 0.46* 0.53*   CALCIUM mg/dL 8.2* 7.9* 8.0*   BILIRUBIN mg/dL <0.2 0.2 0.2   ALK PHOS U/L 74 81 73   ALT (SGPT) U/L 12 11 16   AST (SGOT) U/L 22 17 21   GLUCOSE mg/dL 144* 96 105*     Results from last 7 days   Lab Units 01/23/24  0636 01/21/24  0619 01/20/24  0704 01/19/24  1737   MAGNESIUM mg/dL  --   --  2.5* 1.5*   HEMOGLOBIN g/dL 9.6*   < > 10.0*  10.2* 11.5*   HEMATOCRIT % 29.0*   < > 29.9*  31.4* 34.1   WBC 10*3/mm3 5.11   < > 13.39*  13.58* 14.85*   ALBUMIN g/dL 3.2*   < >  --  3.9    < > = values in this interval not displayed.     Results from last 7 days   Lab Units 01/23/24  0636 01/22/24  0554 01/21/24  0619 01/20/24  0704 01/19/24  1737   PLATELETS 10*3/mm3 99* 96* 91* 110*  117* 142     COVID19   Date Value Ref Range Status   01/19/2024 Not Detected Not Detected - Ref. Range Final     Lab Results   Component Value Date    HGBA1C 6.4 (H) 10/02/2023          Medications           Scheduled Medications amLODIPine, 10 mg, Oral, Daily  aspirin, 81 mg, Oral, Daily  atenolol, 50 mg, Oral, Q24H  atorvastatin, 10 mg, Oral, Daily  cetirizine, 10 mg, Oral, Daily  cycloSPORINE, 1 drop, Both Eyes, BID  fluconazole, 200 mg, Intravenous,  Q24H  potassium chloride, 20 mEq, Oral, Daily  senna-docusate sodium, 2 tablet, Oral, BID       Infusions dextrose 5 % and sodium chloride 0.45 % with KCl 20 mEq/L, 50 mL/hr, Last Rate: 50 mL/hr (01/23/24 0643)       PRN Medications   acetaminophen    senna-docusate sodium **AND** polyethylene glycol **AND** bisacodyl **AND** bisacodyl    Calcium Replacement - Follow Nurse / BPA Driven Protocol    HYDROcodone-acetaminophen    lidocaine    Magnesium Standard Dose Replacement - Follow Nurse / BPA Driven Protocol    melatonin    Morphine    Morphine    ondansetron ODT **OR** ondansetron    Phosphorus Replacement - Follow Nurse / BPA Driven Protocol    Potassium Replacement - Follow Nurse / BPA Driven Protocol    [COMPLETED] Insert Peripheral IV **AND** sodium chloride     Physical Findings          General Findings alert, oriented, room air   Oral/Mouth Cavity WDL   Edema  no edema   Gastrointestinal abdominal pain, nausea, non-distended , normoactive, last bowel movement: 1/23   Skin  skin intact   Tubes/Drains/Lines implantable port   NFPE No clinical signs of muscle wasting or fat loss   --  Current Nutrition Orders & Evaluation of Intake       Oral Nutrition     Food Allergies NKFA   Current PO Diet Diet: Regular/House Diet; Texture: Regular Texture (IDDSI 7); Fluid Consistency: Thin (IDDSI 0)   Supplement Ensure Compact, BID   PO Evaluation     % PO Intake 25%    Factors Affecting Intake: abdominal pain, decreased appetite, nausea, taste changes, weakness   --  PES STATEMENT / NUTRITION DIAGNOSIS      Nutrition Dx Problem  Problem: Inadequate Oral Intake  Etiology: pancreatic cancer, decreased appetite, weakness, abdominal pain, nausea, taste changes  Signs/Symptoms: PO intake and Report/Observation     NUTRITION INTERVENTION / PLAN OF CARE      Intervention Goal(s) Maintain nutrition status, Reduce/improve symptoms, Disease management/therapy, Increase intake, Accepts oral nutrition supplement, Maintain weight,  No significant weight loss, and PO intake goal %: 75+         RD Intervention/Action Interview for preferences, Encourage intake, Continue to monitor, Care plan reviewed, and Recommend/order: ONS   --      Prescription/Orders: Appetite stimulant       PO Diet       Supplements Boost Breeze mixed berry BID B/D      Enteral Nutrition       Parenteral Nutrition    New Prescription Ordered? Yes   --      Monitor/Evaluation Per protocol, PO intake, Supplement intake, Pertinent labs, Weight, GI status, Symptoms, POC/GOC   Discharge Plan/Needs Pending clinical course   --    RD to follow per protocol.      Electronically signed by:  Kisha Gibbons RDN, LD  01/23/24 15:42 EST

## 2024-01-24 ENCOUNTER — PATIENT OUTREACH (OUTPATIENT)
Dept: OTHER | Facility: HOSPITAL | Age: 69
End: 2024-01-24
Payer: MEDICARE

## 2024-01-24 LAB
ALBUMIN SERPL-MCNC: 3.1 G/DL (ref 3.5–5.2)
ALBUMIN/GLOB SERPL: 1.2 G/DL
ALP SERPL-CCNC: 72 U/L (ref 39–117)
ALT SERPL W P-5'-P-CCNC: 15 U/L (ref 1–33)
ANION GAP SERPL CALCULATED.3IONS-SCNC: 10.5 MMOL/L (ref 5–15)
AST SERPL-CCNC: 26 U/L (ref 1–32)
BILIRUB SERPL-MCNC: <0.2 MG/DL (ref 0–1.2)
BUN SERPL-MCNC: <2 MG/DL (ref 8–23)
BUN/CREAT SERPL: ABNORMAL
CALCIUM SPEC-SCNC: 8.5 MG/DL (ref 8.6–10.5)
CHLORIDE SERPL-SCNC: 103 MMOL/L (ref 98–107)
CO2 SERPL-SCNC: 22.5 MMOL/L (ref 22–29)
CREAT SERPL-MCNC: 0.55 MG/DL (ref 0.57–1)
DEPRECATED RDW RBC AUTO: 36.4 FL (ref 37–54)
EGFRCR SERPLBLD CKD-EPI 2021: 99.4 ML/MIN/1.73
EOSINOPHIL # BLD MANUAL: 0.1 10*3/MM3 (ref 0–0.4)
EOSINOPHIL NFR BLD MANUAL: 2 % (ref 0.3–6.2)
ERYTHROCYTE [DISTWIDTH] IN BLOOD BY AUTOMATED COUNT: 12 % (ref 12.3–15.4)
GLOBULIN UR ELPH-MCNC: 2.5 GM/DL
GLUCOSE SERPL-MCNC: 130 MG/DL (ref 65–99)
HCT VFR BLD AUTO: 29 % (ref 34–46.6)
HGB BLD-MCNC: 9.7 G/DL (ref 12–15.9)
LYMPHOCYTES # BLD MANUAL: 0.52 10*3/MM3 (ref 0.7–3.1)
LYMPHOCYTES NFR BLD MANUAL: 12 % (ref 5–12)
MCH RBC QN AUTO: 28.5 PG (ref 26.6–33)
MCHC RBC AUTO-ENTMCNC: 33.4 G/DL (ref 31.5–35.7)
MCV RBC AUTO: 85.3 FL (ref 79–97)
MONOCYTES # BLD: 0.62 10*3/MM3 (ref 0.1–0.9)
NEUTROPHILS # BLD AUTO: 3.95 10*3/MM3 (ref 1.7–7)
NEUTROPHILS NFR BLD MANUAL: 76 % (ref 42.7–76)
NRBC SPEC MANUAL: 1 /100 WBC (ref 0–0.2)
PLAT MORPH BLD: NORMAL
PLATELET # BLD AUTO: 120 10*3/MM3 (ref 140–450)
PMV BLD AUTO: 10.6 FL (ref 6–12)
POTASSIUM SERPL-SCNC: 3.9 MMOL/L (ref 3.5–5.2)
PROT SERPL-MCNC: 5.6 G/DL (ref 6–8.5)
RBC # BLD AUTO: 3.4 10*6/MM3 (ref 3.77–5.28)
RBC MORPH BLD: NORMAL
SODIUM SERPL-SCNC: 136 MMOL/L (ref 136–145)
VARIANT LYMPHS NFR BLD MANUAL: 10 % (ref 19.6–45.3)
WBC MORPH BLD: NORMAL
WBC NRBC COR # BLD AUTO: 5.2 10*3/MM3 (ref 3.4–10.8)

## 2024-01-24 PROCEDURE — 85027 COMPLETE CBC AUTOMATED: CPT | Performed by: INTERNAL MEDICINE

## 2024-01-24 PROCEDURE — 97161 PT EVAL LOW COMPLEX 20 MIN: CPT

## 2024-01-24 PROCEDURE — 80053 COMPREHEN METABOLIC PANEL: CPT | Performed by: INTERNAL MEDICINE

## 2024-01-24 PROCEDURE — 25010000002 ONDANSETRON PER 1 MG: Performed by: INTERNAL MEDICINE

## 2024-01-24 PROCEDURE — 99232 SBSQ HOSP IP/OBS MODERATE 35: CPT | Performed by: INTERNAL MEDICINE

## 2024-01-24 RX ORDER — FLUCONAZOLE 200 MG/1
200 TABLET ORAL EVERY 24 HOURS
Status: DISCONTINUED | OUTPATIENT
Start: 2024-01-24 | End: 2024-01-27 | Stop reason: HOSPADM

## 2024-01-24 RX ORDER — OLANZAPINE 5 MG/1
5 TABLET ORAL NIGHTLY
Status: DISCONTINUED | OUTPATIENT
Start: 2024-01-24 | End: 2024-01-24

## 2024-01-24 RX ORDER — OLANZAPINE 2.5 MG/1
2.5 TABLET, FILM COATED ORAL NIGHTLY
Status: DISCONTINUED | OUTPATIENT
Start: 2024-01-24 | End: 2024-01-27 | Stop reason: HOSPADM

## 2024-01-24 RX ORDER — HYDROCODONE BITARTRATE AND ACETAMINOPHEN 5; 325 MG/1; MG/1
1 TABLET ORAL EVERY 4 HOURS PRN
Status: DISCONTINUED | OUTPATIENT
Start: 2024-01-24 | End: 2024-01-27 | Stop reason: HOSPADM

## 2024-01-24 RX ADMIN — ATORVASTATIN CALCIUM 10 MG: 20 TABLET, FILM COATED ORAL at 08:42

## 2024-01-24 RX ADMIN — POTASSIUM CHLORIDE, DEXTROSE MONOHYDRATE AND SODIUM CHLORIDE 50 ML/HR: 150; 5; 450 INJECTION, SOLUTION INTRAVENOUS at 21:48

## 2024-01-24 RX ADMIN — ASPIRIN 81 MG: 81 TABLET, COATED ORAL at 08:42

## 2024-01-24 RX ADMIN — HYDROCODONE BITARTRATE AND ACETAMINOPHEN 1 TABLET: 7.5; 325 TABLET ORAL at 12:36

## 2024-01-24 RX ADMIN — FLUCONAZOLE 200 MG: 200 TABLET ORAL at 15:02

## 2024-01-24 RX ADMIN — ONDANSETRON 4 MG: 2 INJECTION INTRAMUSCULAR; INTRAVENOUS at 12:36

## 2024-01-24 RX ADMIN — ATENOLOL 50 MG: 50 TABLET ORAL at 21:52

## 2024-01-24 RX ADMIN — CYCLOSPORINE 1 DROP: 0.5 EMULSION OPHTHALMIC at 21:51

## 2024-01-24 RX ADMIN — OLANZAPINE 2.5 MG: 2.5 TABLET, FILM COATED ORAL at 21:50

## 2024-01-24 RX ADMIN — AMLODIPINE BESYLATE 10 MG: 10 TABLET ORAL at 08:43

## 2024-01-24 RX ADMIN — POTASSIUM CHLORIDE, DEXTROSE MONOHYDRATE AND SODIUM CHLORIDE 50 ML/HR: 150; 5; 450 INJECTION, SOLUTION INTRAVENOUS at 01:39

## 2024-01-24 RX ADMIN — CYCLOSPORINE 1 DROP: 0.5 EMULSION OPHTHALMIC at 08:43

## 2024-01-24 NOTE — PLAN OF CARE
Goal Outcome Evaluation:  Plan of Care Reviewed With: patient, spouse           Outcome Evaluation: Patient is a 68 y.o female who presented to [facility] with increased weakness. Patient with history of pancreatic cancer and lupus. Patient AOx4 supine in bed upon arrival. Patient lives at home with her  with 4 TONY and a full flight of steps to the bedroom. Patient is independent at baseline with no AD. Patient completed all bed mobility independently. Patient performed STS from EOB with SV. Patient ambulated 150ft with no AD and SBA. Gait steady with no overt LOB noted. Encouraged patient to continue mobilizing in hallway several times per day. No further acute PT needs identified. PT will sign off.      Anticipated Discharge Disposition (PT): home with assist

## 2024-01-24 NOTE — PROGRESS NOTES
Hpi Title: Evaluation of Skin Lesions Nurse Navigator F/U Visit: Met with patient and spouse at bedside (P380) on 1/22/24 and 1/24/24). Patient has diagnosis of metastatic pancreas cancer with carcinomatosis. She received cycle #1 FOLFIRINOX on 1/15/24. States she didn't tolerate treatment. Complains of not feeling well, abdominal pain (5/10) and unable to eat due to everything tasting like paste. Says her stomach doesn't feel nauseated. However, she has this feeling in her throat. She also c/o vaginal pain and discomfort. She is being followed by Cecilia/Nutrition. Patient remains sad, depressed and helpless/hopeless. Verbalized wanting to go to sleep and never wake up again. No suicidal plan or intent voiced. Patient adamantly states she doesn't want to continue with chemotherapy. Briefly discussed options such as palliative care and hospice care. Agreeable with palliative care referral. Patient met with Katie/palliative care APRN individually and later with her  and sister to discuss palliative care, goals of care, care options, resuscitation status, [facility], discharge options and to establish an advance care plan. Initially, patient's spouse wanted her to continue treatment and to keep fighting. However, after meeting with the palliative care nurse he states he wants to honor his wife's wishes. Reviewed case with patient's nurse Aleena, Eden/Behavioral Health APRN and Radha/[facility] pharmacist regarding medication for patient's depression and lack of appetite. Patient had been started on Remeron per hospitalist but was discontinued and changed to Zyprexa. Educated patient about Zyprexa and why it was prescribed. Also discussed with patient her hesitancy about taking pain and nausea medication. States she didn't like how she felt after taking Morphine. Reminded patient that she has Norco ordered for pain. She had one during the night and was able to tolerate. Also reminded patient to take her nausea medication 30 minutes prior to eating as   Diana had instructed. Patient verbalized understanding. Spoke with Katie/palliative care APRN. States patient and spouse met with Clarita/Alejanrdina. Will see patient and family at bedside to confirm plan of care/discharge plans. No additional needs or concerns noted. Encouraged to call with questions or concerns. Continue to follow.……Josy Monroy RN, Oncology Nurse Navigator

## 2024-01-24 NOTE — THERAPY EVALUATION
Patient Name: Eveline Govea  : 1955    MRN: [id number]                              Today's Date: 2024       Admit Date: 2024    Visit Dx:     ICD-10-CM ICD-9-CM   1. Hypokalemia  E87.6 276.8   2. Chemotherapy-induced fatigue  R53.83 780.79    T45.1X5A E933.1   3. Leukocytosis, unspecified type  D72.829 288.60     Patient Active Problem List   Diagnosis    Hot flash, menopausal    Essential hypertension    SLE (systemic lupus erythematosus)    Pancytopenia    Malignant neoplasm of other parts of pancreas    High risk medication use    Encounter for management of implanted device    Thrush     Past Medical History:   Diagnosis Date    Arthritis     H/O mammogram 2015    Dr Lizzy Gary    Hypertension     Lupus     Pancreatic cancer      Past Surgical History:   Procedure Laterality Date    COLONOSCOPY  2014    Dr Rodgers    FOOT SURGERY  1990    Dr Urbina    HYSTERECTOMY      Dr Amin    TOE SURGERY Bilateral     TUBAL ABDOMINAL LIGATION      Dr Collins      General Information       Row Name 24 1406          Physical Therapy Time and Intention    Document Type evaluation  -     Mode of Treatment individual therapy;physical therapy  -       Row Name 24 1406          General Information    Patient Profile Reviewed yes  -SM     Prior Level of Function independent:  -       Row Name 24 1406          Living Environment    People in Home spouse  -       Row Name 24 1406          Home Main Entrance    Number of Stairs, Main Entrance four  -       Row Name 24 1406          Stairs Within Home, Primary    Number of Stairs, Within Home, Primary twelve  -       Row Name 24 1406          Cognition    Orientation Status (Cognition) oriented x 4  -SM               User Key  (r) = Recorded By, (t) = Taken By, (c) = Cosigned By      Initials Name Provider Type    SM Monet Yates PT Physical Therapist                   Mobility        Row Name 01/24/24 1406          Bed Mobility    Bed Mobility supine-sit;sit-supine  -     Supine-Sit [place] (Bed Mobility) independent  -SM     Sit-Supine [place] (Bed Mobility) independent  -SM     Assistive Device (Bed Mobility) bed rails;head of bed elevated  -       Row Name 01/24/24 1406          Sit-Stand Transfer    Sit-Stand [place] (Transfers) supervision  -       Row Name 01/24/24 1406          Gait/Stairs (Locomotion)    [place] Level (Gait) standby assist  -     Distance in Feet (Gait) 150ft  -     Deviations/Abnormal Patterns (Gait) gait speed decreased  -     Comment, (Gait/Stairs) Gait steady with no overt LOB noted.  -               User Key  (r) = Recorded By, (t) = Taken By, (c) = Cosigned By      Initials Name Provider Type    Monet Gonzales PT Physical Therapist                   Obj/Interventions       Row Name 01/24/24 1407          Range of Motion Comprehensive    General Range of Motion bilateral lower extremity ROM WFL  -[facility] Name 01/24/24 1407          Strength Comprehensive (MMT)    Comment, General Manual Muscle Testing (MMT) Assessment Generalized weakness  -[facility] Name 01/24/24 1407          Balance    Balance Assessment sitting static balance;sitting dynamic balance;sit to stand dynamic balance;standing static balance;standing dynamic balance  -     Static Sitting Balance independent  -SM     Dynamic Sitting Balance modified independence  -     Position, Sitting Balance sitting edge of bed  -     Sit to Stand Dynamic Balance supervision  -     Static Standing Balance supervision  -     Dynamic Standing Balance supervision  -     Position/Device Used, Standing Balance unsupported  -     Balance Interventions sitting;standing;sit to stand;static;dynamic  -               User Key  (r) = Recorded By, (t) = Taken By, (c) = Cosigned By      Initials Name Provider Type    Monet Gonzales PT Physical Therapist                    Goals/Plan    No documentation.                  Clinical Impression       [facility] Name 01/24/24 1408          Pain    Pretreatment Pain Rating 0/10 - no pain  -     Posttreatment Pain Rating 0/10 - no pain  -SM       Row Name 01/24/24 1408          Plan of Care Review    Plan of Care Reviewed With patient;spouse  -     Outcome Evaluation Patient is a 68 y.o female who presented to [facility] with increased weakness. Patient with history of pancreatic cancer and lupus. Patient AOx4 supine in bed upon arrival. Patient lives at home with her  with 4 TONY and a full flight of steps to the bedroom. Patient is independent at baseline with no AD. Patient completed all bed mobility independently. Patient performed STS from EOB with SV. Patient ambulated 150ft with no AD and SBA. Gait steady with no overt LOB noted. Encouraged patient to continue mobilizing in hallway several times per day. No further acute PT needs identified. PT will sign off.  -SM       Row Name 01/24/24 1408          Therapy Assessment/Plan (PT)    Criteria for Skilled Interventions Met (PT) no problems identified which require skilled intervention;no  -     Therapy Frequency (PT) evaluation only  -SM       Row Name 01/24/24 1408          Vital Signs    Pre Patient Position Supine  -     Intra Patient Position Standing  -SM     Post Patient Position Supine  -SM       Row Name 01/24/24 1408          Positioning and Restraints    Pre-Treatment Position in bed  -SM     Post Treatment Position bed  -SM     In Bed notified nsg;call light within reach;encouraged to call for assist;fowlers;with family/caregiver  -               User Key  (r) = Recorded By, (t) = Taken By, (c) = Cosigned By      Initials Name Provider Type     Monet Yates, PT Physical Therapist                   Outcome Measures       [facility] Name 01/24/24 1410 01/24/24 0842       How much help from another person do you currently need...    Turning from your back to  your side while in flat bed without using bedrails? 4  -SM 4  -MB    Moving from lying on back to sitting on the side of a flat bed without bedrails? 4  - 4  -MB    Moving to and from a bed to a chair (including a wheelchair)? 4  -SM 4  -MB    Standing up from a chair using your arms (e.g., wheelchair, bedside chair)? 4  - 4  -MB    Climbing 3-5 steps with a railing? 3  -SM 3  -MB    To walk in hospital room? 4  -SM 4  -MB    AM-PAC 6 Clicks Score (PT) 23  - 23  -MB    Highest Level of Mobility Goal 7 --> Walk 25 feet or more  - 7 --> Walk 25 feet or more  -MB      Row Name 01/24/24 1410          Functional Assessment    Outcome Measure Options AM-PAC 6 Clicks Basic Mobility (PT)  -               User Key  (r) = Recorded By, (t) = Taken By, (c) = Cosigned By      Initials Name Provider Type    Aleena Clarke, RN Registered Nurse     Monet Yates, LAURIE Physical Therapist                                 Physical Therapy Education       Title: PT OT SLP Therapies (In Progress)       Topic: Physical Therapy (In Progress)       Point: Mobility training (Done)       Learning Progress Summary             Patient Acceptance, E, VU by  at 1/24/2024 1411                         Point: Home exercise program (Not Started)       Learner Progress:  Not documented in this visit.              Point: Body mechanics (Done)       Learning Progress Summary             Patient Acceptance, E, VU by  at 1/24/2024 1411                         Point: Precautions (Done)       Learning Progress Summary             Patient Acceptance, E, VU by  at 1/24/2024 1411                                         User Key       Initials Effective Dates Name Provider Type Discipline     05/02/22 -  Monet Yates PT Physical Therapist PT                  PT Recommendation and Plan     Plan of Care Reviewed With: patient, spouse  Outcome Evaluation: Patient is a 68 y.o female who presented to [facility] with increased weakness.  Patient with history of pancreatic cancer and lupus. Patient AOx4 supine in bed upon arrival. Patient lives at home with her  with 4 TONY and a full flight of steps to the bedroom. Patient is independent at baseline with no AD. Patient completed all bed mobility independently. Patient performed STS from EOB with SV. Patient ambulated 150ft with no AD and SBA. Gait steady with no overt LOB noted. Encouraged patient to continue mobilizing in hallway several times per day. No further acute PT needs identified. PT will sign off.     Time Calculation:         PT Charges       Row Name 01/24/24 1411             Time Calculation    Start Time 1351  -SM      Stop Time 1359  -SM      Time Calculation (min) 8 min  -SM      PT Received On 01/24/24  -                User Key  (r) = Recorded By, (t) = Taken By, (c) = Cosigned By      Initials Name Provider Type    Monet Gonzales PT Physical Therapist                  Therapy Charges for Today       Code Description Service Date Service Provider Modifiers Qty    [id number] HC PT EVAL LOW COMPLEXITY 2 1/24/2024 Monet Yates, LAURIE GP 1            PT G-Codes  Outcome Measure Options: AM-PAC 6 Clicks Basic Mobility (PT)  AM-PAC 6 Clicks Score (PT): 23  PT Discharge Summary  Anticipated Discharge Disposition (PT): home with assist    Monet Yates PT  1/24/2024

## 2024-01-24 NOTE — PLAN OF CARE
Goal Outcome Evaluation:           Progress: improving  Outcome Evaluation: Pain medication adjusted. Encouraging appropriate use of medication, pt educated on necessity of pain medication for her diagnosis and symptoms. Very little PO intake this shift. Up to the restroom with stand by assistance as needed.

## 2024-01-24 NOTE — PLAN OF CARE
Goal Outcome Evaluation:  Plan of Care Reviewed With: patient, spouse        Progress: improving  Outcome Evaluation: Pt without complaint of nausea--had attempted to eat white cast[place] that her  had picked up--only had nibbles due to taste.  Rated pain 5/10 of left flank/ upper left abd--did not want to take anything for it--said she new it was available if needed.   at , sister visited, up with stand-by assist to bathroom.  D51/2NS with 20KCl infusing at 50/hr.

## 2024-01-24 NOTE — PROGRESS NOTES
Name: Eveline Govea ADMIT: 2024   : 1955  PCP: Rosibel Seymour MD    MRN: [id number] LOS: 4 days   AGE/SEX: 68 y.o. female  ROOM: [facility] [place]     Subjective   Subjective   Feels about the same.  Tried to eat some outside food from [place] and says it did not go well.  Still some episodic lower abdominal pain and nausea       Objective   Objective   Vital Signs  Temp:  [98.2 °F (36.8 °C)-99 °F (37.2 °C)] 98.2 °F (36.8 °C)  Heart Rate:  [70-80] 70  Resp:  [16-18] 18  BP: (104-129)/(68-78) 104/68  SpO2:  [97 %-99 %] 98 %  on   ;   Device (Oxygen Therapy): room air  Body mass index is 22.03 kg/m².  Physical Exam  Vitals reviewed.   Constitutional:       General: She is not in acute distress.     Appearance: She is not ill-appearing.   HENT:      Head: Normocephalic and atraumatic.   Cardiovascular:      Rate and Rhythm: Normal rate and regular rhythm.   Pulmonary:      Effort: Pulmonary effort is normal.   Abdominal:      General: There is distension.      Palpations: Abdomen is soft.      Tenderness: There is no abdominal tenderness.   Musculoskeletal:      Right lower leg: No edema.      Left lower leg: No edema.   Skin:     General: Skin is warm and dry.   Neurological:      Mental Status: She is alert and oriented to person, place, and time.   Psychiatric:         Mood and Affect: Mood normal.       Results Review     I reviewed the patient's new clinical results.  Results from last 7 days   Lab Units 24  0654 24  0636 24  0554 24  0619   WBC 10*3/mm3 5.20 5.11 8.14 9.41   HEMOGLOBIN g/dL 9.7* 9.6* 9.6* 9.5*   PLATELETS 10*3/mm3 120* 99* 96* 91*     Results from last 7 days   Lab Units 24  0654 24  0636 24  0553 24  0619   SODIUM mmol/L 136 133* 131* 132*   POTASSIUM mmol/L 3.9 3.8 3.7 3.7   CHLORIDE mmol/L 103 100 98 100   CO2 mmol/L 22.5 23.0 20.5* 23.0   BUN mg/dL <2* 3* 5* 6*   CREATININE mg/dL 0.55* 0.51* 0.46* 0.53*   GLUCOSE mg/dL 130*  "144* 96 105*   EGFR mL/min/1.73 99.4 101.2 103.7 100.3     Results from last 7 days   Lab Units 01/24/24  0654 01/23/24  0636 01/22/24  0553 01/21/24  0619   ALBUMIN g/dL 3.1* 3.2* 2.8* 3.2*   BILIRUBIN mg/dL <0.2 <0.2 0.2 0.2   ALK PHOS U/L 72 74 81 73   AST (SGOT) U/L 26 22 17 21   ALT (SGPT) U/L 15 12 11 16     Results from last 7 days   Lab Units 01/24/24  0654 01/23/24  0636 01/22/24  0553 01/21/24  0619 01/20/24  0704 01/19/24  1737   CALCIUM mg/dL 8.5* 8.2* 7.9* 8.0* 8.3* 9.4   ALBUMIN g/dL 3.1* 3.2* 2.8* 3.2*  --  3.9   MAGNESIUM mg/dL  --   --   --   --  2.5* 1.5*       No results found for: \"HGBA1C\", \"POCGLU\"    No radiology results for the last day    I have personally reviewed all medications:  Scheduled Medications  amLODIPine, 10 mg, Oral, Daily  aspirin, 81 mg, Oral, Daily  atenolol, 50 mg, Oral, Q24H  atorvastatin, 10 mg, Oral, Daily  cetirizine, 10 mg, Oral, Daily  cycloSPORINE, 1 drop, Both Eyes, BID  fluconazole, 200 mg, Oral, Q24H  OLANZapine, 2.5 mg, Oral, Nightly  potassium chloride, 20 mEq, Oral, Daily  senna-docusate sodium, 2 tablet, Oral, BID    Infusions  dextrose 5 % and sodium chloride 0.45 % with KCl 20 mEq/L, 50 mL/hr, Last Rate: 50 mL/hr (01/24/24 0139)    Diet  Diet: Regular/House Diet; Texture: Regular Texture (IDDSI 7); Fluid Consistency: Thin (IDDSI 0)    I have personally reviewed:  [x]  Laboratory   []  Microbiology   []  Radiology   []  EKG/Telemetry  []  Cardiology/Vascular   []  Pathology    []  Records       Assessment/Plan     Active Hospital Problems    Diagnosis  POA    Thrush [B37.0]  Yes    Malignant neoplasm of other parts of pancreas [C25.7]  Yes    Essential hypertension [I10]  Yes    SLE (systemic lupus erythematosus) [M32.9]  Yes      Resolved Hospital Problems    Diagnosis Date Resolved POA    **Hypokalemia [E87.6] 01/23/2024 Yes       68 y.o. female with metastatic pancreatic cancer, just started cycle 1 of chemo admitted with Hypokalemia.    Dehydration and " electrolyte abnormalities due to chemo and anorexia.  Still very poor appetite.    - I had started Remeron but per discussion with pharmacy, Zyprexa more indicated for appetite stimulation.    - Continue gentle IV fluids for now.    -Oncology note reviewed.  Apparently since I saw the patient they have decided on no further treatment and have consulted hospice to follow at home.  - Continue current pain regimen    Thrush improving.  Continue fluconazole IV as patient poorly tolerant of nystatin.      Anemia/thrombocytopenia stable.      HTN: Holding chlorthalidone given dehydration.  Likely indefinitely.  BP stable without it      SCDs for DVT prophylaxis.  Dispo: Likely home soon, potentially tomorrow with hospice?  Discussed with  at bedside as well      Wood Pradhan MD  [facility]  01/24/24  16:31 EST

## 2024-01-25 ENCOUNTER — PATIENT OUTREACH (OUTPATIENT)
Dept: OTHER | Facility: HOSPITAL | Age: 69
End: 2024-01-25
Payer: MEDICARE

## 2024-01-25 PROCEDURE — 99232 SBSQ HOSP IP/OBS MODERATE 35: CPT | Performed by: INTERNAL MEDICINE

## 2024-01-25 RX ADMIN — FLUCONAZOLE 200 MG: 200 TABLET ORAL at 15:47

## 2024-01-25 RX ADMIN — CYCLOSPORINE 1 DROP: 0.5 EMULSION OPHTHALMIC at 20:51

## 2024-01-25 RX ADMIN — ASPIRIN 81 MG: 81 TABLET, COATED ORAL at 10:18

## 2024-01-25 RX ADMIN — ATENOLOL 50 MG: 50 TABLET ORAL at 20:51

## 2024-01-25 RX ADMIN — HYDROCODONE BITARTRATE AND ACETAMINOPHEN 1 TABLET: 5; 325 TABLET ORAL at 20:53

## 2024-01-25 RX ADMIN — AMLODIPINE BESYLATE 10 MG: 10 TABLET ORAL at 10:18

## 2024-01-25 RX ADMIN — OLANZAPINE 2.5 MG: 2.5 TABLET, FILM COATED ORAL at 20:52

## 2024-01-25 RX ADMIN — ATORVASTATIN CALCIUM 10 MG: 20 TABLET, FILM COATED ORAL at 10:18

## 2024-01-25 NOTE — PLAN OF CARE
"Goal Outcome Evaluation:  Plan of Care Reviewed With: patient        Progress: no change  Outcome Evaluation: Pt stated pain is tolerable without pain meds--denies nausea, stated only being able to eat a few bites of food brought in by --\"nothing taste good anymore\"--stand-by assist to bathroom,  at bedside.                               "

## 2024-01-25 NOTE — CONSULTS
Met with patient and spouse and provided explanation of services. Written material provided. Pt verbalized DNR. Pt is alert and oriented. Pt states she has pain but it is manageable and did not request any meds. Pt would want follow up at home with [facility]. [facility] to follow up at home. Pt does not need any DME or supplies.     Thank you for the referral.    Clarita Del Toro RN  [facility]  [phone]

## 2024-01-25 NOTE — PROGRESS NOTES
Name: Eveline Govea ADMIT: 2024   : 1955  PCP: Rosibel Seymour MD    MRN: [id number] LOS: 5 days   AGE/SEX: 68 y.o. female  ROOM: [facility]     Subjective   Subjective   Feeling okay today. Still c/o dry mouth and difficulty tolerating po as a result. No pain with swallowing. Thrush improving. Abd pain and nausea are only intermittent and mild       Objective   Objective   Vital Signs  Temp:  [98.2 °F (36.8 °C)-98.6 °F (37 °C)] 98.4 °F (36.9 °C)  Heart Rate:  [70-80] 77  Resp:  [17-18] 17  BP: (104-123)/(58-69) 123/67  SpO2:  [97 %-99 %] 98 %  on   ;   Device (Oxygen Therapy): room air  Body mass index is 22.46 kg/m².  Physical Exam  Vitals and nursing note reviewed. Exam conducted with a chaperone present ( and RN).   Constitutional:       General: She is not in acute distress.     Appearance: She is ill-appearing. She is not toxic-appearing or diaphoretic.   HENT:      Head: Normocephalic.      Nose: Nose normal.      Mouth/Throat:      Mouth: Mucous membranes are moist.      Pharynx: Oropharynx is clear.   Eyes:      General: No scleral icterus.        Right eye: No discharge.         Left eye: No discharge.      Extraocular Movements: Extraocular movements intact.      Conjunctiva/sclera: Conjunctivae normal.   Cardiovascular:      Rate and Rhythm: Normal rate and regular rhythm.      Pulses: Normal pulses.   Pulmonary:      Effort: Pulmonary effort is normal. No respiratory distress.      Breath sounds: Normal breath sounds. No wheezing or rales.   Abdominal:      General: Bowel sounds are normal. There is distension (mild).      Palpations: Abdomen is soft.      Tenderness: There is no abdominal tenderness.   Musculoskeletal:         General: No swelling or deformity. Normal range of motion.      Cervical back: Neck supple.   Skin:     General: Skin is warm and dry.      Capillary Refill: Capillary refill takes less than 2 seconds.      Coloration: Skin is not jaundiced.  "  Neurological:      General: No focal deficit present.      Mental Status: She is alert and oriented to person, place, and time. Mental status is at baseline.      Cranial Nerves: No cranial nerve deficit.      Coordination: Coordination normal.   Psychiatric:         Mood and Affect: Mood normal.         Behavior: Behavior normal.         Thought Content: Thought content normal.       Results Review     I reviewed the patient's new clinical results.  Results from last 7 days   Lab Units 01/24/24  0654 01/23/24  0636 01/22/24  0554 01/21/24  0619   WBC 10*3/mm3 5.20 5.11 8.14 9.41   HEMOGLOBIN g/dL 9.7* 9.6* 9.6* 9.5*   PLATELETS 10*3/mm3 120* 99* 96* 91*     Results from last 7 days   Lab Units 01/24/24  0654 01/23/24  0636 01/22/24  0553 01/21/24  0619   SODIUM mmol/L 136 133* 131* 132*   POTASSIUM mmol/L 3.9 3.8 3.7 3.7   CHLORIDE mmol/L 103 100 98 100   CO2 mmol/L 22.5 23.0 20.5* 23.0   BUN mg/dL <2* 3* 5* 6*   CREATININE mg/dL 0.55* 0.51* 0.46* 0.53*   GLUCOSE mg/dL 130* 144* 96 105*   EGFR mL/min/1.73 99.4 101.2 103.7 100.3     Results from last 7 days   Lab Units 01/24/24  0654 01/23/24  0636 01/22/24  0553 01/21/24  0619   ALBUMIN g/dL 3.1* 3.2* 2.8* 3.2*   BILIRUBIN mg/dL <0.2 <0.2 0.2 0.2   ALK PHOS U/L 72 74 81 73   AST (SGOT) U/L 26 22 17 21   ALT (SGPT) U/L 15 12 11 16     Results from last 7 days   Lab Units 01/24/24  0654 01/23/24  0636 01/22/24  0553 01/21/24  0619 01/20/24  0704 01/19/24  1737   CALCIUM mg/dL 8.5* 8.2* 7.9* 8.0* 8.3* 9.4   ALBUMIN g/dL 3.1* 3.2* 2.8* 3.2*  --  3.9   MAGNESIUM mg/dL  --   --   --   --  2.5* 1.5*       No results found for: \"HGBA1C\", \"POCGLU\"    No radiology results for the last day    I have personally reviewed all medications:  Scheduled Medications  amLODIPine, 10 mg, Oral, Daily  aspirin, 81 mg, Oral, Daily  atenolol, 50 mg, Oral, Q24H  atorvastatin, 10 mg, Oral, Daily  cetirizine, 10 mg, Oral, Daily  cycloSPORINE, 1 drop, Both Eyes, BID  fluconazole, 200 mg, " Oral, Q24H  OLANZapine, 2.5 mg, Oral, Nightly  potassium chloride, 20 mEq, Oral, Daily  senna-docusate sodium, 2 tablet, Oral, BID    Infusions  dextrose 5 % and sodium chloride 0.45 % with KCl 20 mEq/L, 50 mL/hr, Last Rate: 50 mL/hr (01/24/24 2148)    Diet  Diet: Regular/House Diet; Texture: Regular Texture (IDDSI 7); Fluid Consistency: Thin (IDDSI 0)    I have personally reviewed:  [x]  Laboratory   []  Microbiology   []  Radiology   []  EKG/Telemetry  []  Cardiology/Vascular   []  Pathology    [x]  Records       Assessment/Plan     Active Hospital Problems    Diagnosis  POA    **Malignant neoplasm of other parts of pancreas [C25.7]  Yes    Thrush [B37.0]  Yes    Essential hypertension [I10]  Yes    SLE (systemic lupus erythematosus) [M32.9]  Yes      Resolved Hospital Problems    Diagnosis Date Resolved POA    Hypokalemia [E87.6] 01/23/2024 Yes       69yo woman with metastatic pancreatic cancer, just started cycle 1 of chemo, admitted with hypokalemia.     Dehydration and electrolyte abnormalities due to chemo and anorexia:    - Zyprexa for appetite stimulation.    - Stop IVFs, encouraged po fluids.   - Heme/Onc note reviewed. Pt has decided on no further treatment, have consulted hospice, meeting today at 1100.  - Continue current PRN pain meds--not requiring any really.  - Continue current PRN Zofran     Thrush:  improving on Diflucan.     Anemia/thrombocytopenia:  stable.       HTN: Holding chlorthalidone given dehydration--would not restart as BPs acceptable without it. Continue amlodipine and atenolol.      SCDs for DVT prophylaxis.  Full code.  Discussed with patient.  Anticipate discharge home with family and hospice tomorrow AM if continues to do well.  Has f/u appt with Dr. Boyer (Heme/Onc) on Monday 1/29.      Cuauhtemoc Bentley MD  [facility]  01/25/24  10:05 EST

## 2024-01-25 NOTE — PLAN OF CARE
Goal Outcome Evaluation:  Plan of Care Reviewed With: patient        Progress: declining  Outcome Evaluation: Cont with poor PO intake. Receiving PO diflucan. Anticipates d/c home tomorrow. No requests for PRN pain or nausea medication this shift. [facility] to follow once discharged.

## 2024-01-25 NOTE — CASE MANAGEMENT/SOCIAL WORK
Continued Stay Note  [facility]     Patient Name: Eveline Govea  MRN: [id number]  Today's Date: 1/25/2024    Admit Date: 1/19/2024    Plan: Home with family to transport.   Discharge Plan       Row Name 01/25/24 1547       Plan    Plan Home with family to transport.    Patient/Family in Agreement with Plan yes    Plan Comments Message received from [facility]/ Hospice stating that per their meeting with patient and family that patient will reach out to them after returning home and that she does not current need anything further to discharge home.                   Discharge Codes    No documentation.                 Expected Discharge Date and Time       Expected Discharge Date Expected Discharge Time    Jan 25, 2024               Roxi Hernandez RN

## 2024-01-25 NOTE — PROGRESS NOTES
Nurse Navigator F/U Visit: Discussed case with patient's nurse Honey regarding [facility] consult and discharge plans. States plan is for discharge tomorrow and patient to contact [facility] on Monday to be admitted to hospice services. Spoke with Winnie KOWALSKI to discuss discharge plan. Confirmed patient's plan is to wait until Monday to enroll into hospice care. Nurse navigator verbalized concerns regarding gap in care. If hospitalist writes for discharge medications, they will only prescribe pain medications x3 days. Met with patient and spouse at bedside to discuss concerns surrounding discharge plans. Explained to them that Dr. Boyer may not continue to write for pain medications if he is no longer following. However, once she is admitted to [facility], they will manage all of her needs including pain medications and starting her on an antidepressant. Verbalized understanding. Dr. Ortiz also met with patient and spouse. [facility] she spoke with Dr. Boyer to update him regarding patient's plans to discharge home with [facility] care. Dr. Ortiz states she will write for patient's pain medication at discharge. Call placed to Winnie KOWALSKI to update. Messages sent to cancel appointments with Dr. Boyer and Armando on Monday. No additional needs noted. Will sign off……Josy Monroy RN, Oncology Nurse Navigator

## 2024-01-25 NOTE — PROGRESS NOTES
Subjective .     REASONS FOR FOLLOWUP:  Metastatic pancreatic cancer     Interval history:  January 21, 2024: Patient is s/p cycle 1 FOLFIRINOX with growth factor support  Hemoglobin 9.5, white count of 9.41 and platelet of 91.  Afebrile    Patient states she is in pain.  Her level of pain is 5 on a 1-10 scale in the abdomen.  We will try morphine PA.  2 mg IV every 4 hours as needed    January 22, 2024: Patient has a low-grade temperature 99 rest of vital signs are stable.  Hemoglobin down to 9.6 white count of 8.14, platelet of 96.  Patient apparently does not want to continue any further chemotherapy as she is feeling worse with chemotherapy.  However patient's  wants her to continue chemotherapy.  Palliative care nurse has been consulted.     January 23, 2024: Palliative care nurse met with patient.  Patient is not interested to continue any further treatment.  But her  and children prefer her to have treatment.  She also wants to discuss resuscitation that status with her  she prefers to be DNR  Able CBC with a white count of 5.11 hemoglobin of 9.6 and a platelet of 99.  Will need to discuss with Dr. Boyer.  If patient is discharged she can follow-up with Dr. Boyer    January 24, 2024: Patient is afebrile, vital signs stable.  Nausea improved.  Pain 5 out of 10 on the left flank and was refusing to take pain meds.  I discussed with patient and patient's .  Both are in agreement for no resuscitation.  And patient has made it very clear that she does not want any chemotherapy.  Discussion done with Dr. Boyer and he is in agreement with that.  Will consult palliative care and hospice    January 25, 2024: Discussed in length with patient and patient's .  They want to go home with hospice.  Currently decreased the Norco from 7.5 mg - 5 mg.  Every 4 hours    HISTORY OF PRESENT ILLNESS:    Patient is a 68-year-old female who has been diagnosed with metastatic adenocarcinoma the  pancreas with carcinomatosis.  She was seen by our nurse practitioner Cata on January 15, 2024.  Patient was diagnosed back in November 2023 with peritoneal carcinomatosis.  And had primary pancreatic malignancy with history of pancreatic extension and vascular involvement with shotty retroperitoneal lymphadenopathy.  Patient also had a bladder wall thickening and nonocclusive thrombus of the left ovarian vein and noncalcified pulmonary nodules.     Had shown evidence of peritoneal carcinomatosis with multiple hypermetabolic peritoneal implants     There was history of extensive family history of breast cancer and patient underwent genetic testing.     Was started on modified FOLFIRINOX with growth factor support every 2 weeks starting January 15, 2024 when cycle 1 was given.     And also has history of chronic leukopenia and neutropenia followed by Dr. Finley     Patient is followed by Dr. Boyer in our office.     Patient was followed by Cecilia Saba on January 17.  She came for unhooking of the chemotherapy.  She has not been eating well.  She just to cut chocolate Ensure.  She called our office January 19 as she has not been eating or drinking.  Not nauseated.  Got admitted because of lack of p.o. intake, severe fatigue nausea weakness and some abdominal discomfort.  Globin on admission was 11.5, white count of 14.85 and platelet count of 142.  Her potassium was 2.8     This morning her hemoglobin is down to 10.2 with a platelet of 117 and white count of 13 she is afebrile and blood pressure stable at 116 x 62 and oxygen saturation is 100%.  Chest x-ray negative.      Past Medical History:   Diagnosis Date    Arthritis     H/O mammogram 09/01/2015    Dr Lizzy Gary    Hypertension     Lupus     Pancreatic cancer 2023       ONCOLOGIC HISTORY:  (History from previous dates can be found in the separate document.)    No current facility-administered medications on file prior to encounter.     Current Outpatient  Medications on File Prior to Encounter   Medication Sig Dispense Refill    amLODIPine (NORVASC) 10 MG tablet Take 1 tablet by mouth Daily.      aspirin 81 MG tablet Take 1 tablet by mouth Daily.      atenolol-chlorthalidone (TENORETIC) 50-25 MG per tablet Take 1 tablet by mouth Daily.      Biotin 5000 MCG tablet Take 1 tablet by mouth daily.      CALCIUM PO Take 2 tablets by mouth daily.      loratadine (CLARITIN) 10 MG tablet Take 1 tablet by mouth Daily.      lovastatin (MEVACOR) 20 MG tablet Take 1 tablet by mouth every night. 90 tablet 3    RESTASIS 0.05 % ophthalmic emulsion Administer 1 drop to both eyes 2 (Two) Times a Day.      betamethasone dipropionate (DIPROLENE) 0.05 % ointment Apply  topically daily as needed. (Patient not taking: Reported on 1/19/2024)      Diclofenac Sodium (VOLTAREN) 1 % gel gel Apply 4 g topically to the appropriate area as directed. (Patient not taking: Reported on 1/19/2024)      lidocaine-prilocaine (EMLA) 2.5-2.5 % cream APPLY NICKEL SIZE AMOUNT DIRECTLY ON PORT SITE 30-60 MINUTES PRIOR TO HAVING PORT ACCESSED. COVER WITH SARAN WRAP. 30 g 1    Omega-3 Fatty Acids (FISH OIL) 1000 MG capsule capsule Take 1 capsule by mouth Daily With Breakfast. (Patient not taking: Reported on 1/19/2024)      ondansetron (ZOFRAN) 8 MG tablet Take 1 tablet by mouth 3 (Three) Times a Day As Needed for Nausea or Vomiting. 30 tablet 5    phenazopyridine (PYRIDIUM) 200 MG tablet  (Patient not taking: Reported on 1/19/2024)      potassium chloride (K-DUR,KLOR-CON) 20 MEQ CR tablet Take 1 tablet by mouth daily. (Patient not taking: Reported on 1/19/2024) 90 tablet 3    sulfamethoxazole-trimethoprim (BACTRIM DS,SEPTRA DS) 800-160 MG per tablet  (Patient not taking: Reported on 1/19/2024)         ALLERGIES:     Allergies   Allergen Reactions    Ace Inhibitors        Social History     Socioeconomic History    Marital status:     Number of children: 2   Tobacco Use    Smoking status: Never   Vaping  Use    Vaping Use: Never used   Substance and Sexual Activity    Alcohol use: No    Drug use: Never    Sexual activity: Defer         Cancer-related family history includes Breast cancer in her mother, sister, and sister; Cancer in her mother and sister.     Review of Systems  A comprehensive 14 point review of systems was performed and was negative except as mentioned.  Patient has abdominal pain    Objective      Vitals:    01/24/24 1930 01/25/24 0518 01/25/24 0725 01/25/24 1130   BP: 109/64 107/58 123/67 119/72   BP Location: Right arm Right arm Right arm Right arm   Patient Position: Lying Lying Lying Sitting   Pulse: 78 78 77 84   Resp: 18 18 17 16   Temp: 98.5 °F (36.9 °C) 98.3 °F (36.8 °C) 98.4 °F (36.9 °C) 99.1 °F (37.3 °C)   TempSrc: Oral Oral Oral Oral   SpO2: 99% 98% 98% 99%   Weight:  57.5 kg (126 lb 12.2 oz)     Height:             1/17/2024     1:16 PM   Current Status   ECOG score 0       Physical Exam    RESPIRATORY:  Normal respiratory effort.  No rales  or wheezing, clear.   CARDIOVASCULAR:  Regular rate and rhythm, no murmur  No significant lower extremity edema.  Abdomen: Soft nontender positive bowel sounds no hepatosplenomegaly  SKIN: No wounds.  No rashes.  MUSCULOSKELETAL/EXTREMITIES: No clubbing or cyanosis.  No apparent unilateral weakness.  NEURO: CN 2-12 appear intact. No focal neurological deficits noted.  PSYCHIATRIC:  Normal judgment and insight.  Normal mood and affect.      RECENT LABS:  Hematology WBC   Date Value Ref Range Status   01/24/2024 5.20 3.40 - 10.80 10*3/mm3 Final     RBC   Date Value Ref Range Status   01/24/2024 3.40 (L) 3.77 - 5.28 10*6/mm3 Final     Hemoglobin   Date Value Ref Range Status   01/24/2024 9.7 (L) 12.0 - 15.9 g/dL Final     Hematocrit   Date Value Ref Range Status   01/24/2024 29.0 (L) 34.0 - 46.6 % Final     Platelets   Date Value Ref Range Status   01/24/2024 120 (L) 140 - 450 10*3/mm3 Final        Assessment & Plan       *.  Admitted with dehydration,  decreased p.o. intake, hypokalemia following treatment with FOLFIRINOX on January 15, 2024 by Dr. Boyer.  Cycle 1 given  Very poor intake  Hypotensive when admitted now with normal blood pressure  Patient expressed concerns of not continuing any further treatment  Palliative care met with her.  She is going to discuss with her family and decide on the resuscitation status and also prefers not to  January 24, 2024: Nausea improved.  Pain 5 out of 10, has been refusing pain meds.  Has been wanting to stop chemotherapy but will encourage to consider less aggressive chemo as family really wants to try.  Will discuss with family in length     *.  Anemia and Thrombocytopenia not neutropenic yet    *.  Abdominal pain: 4 or 5 on a 1-10 scale.  We will try IV morphine for pain.  She did have loose stools today.  Currently on IV morphine as needed        *Metastatic adenocarcinoma of the pancreas with carcinomatosis  She presented with a several month history of abdominal pain.  11/22/2023: CT abdomen and pelvis with peritoneal carcinomatosis, small volume ascites, abnormal appearance of the body and tail of the pancreas consistent with primary pancreas malignancy with extrapancreatic extension and vascular involvement, shotty retroperitoneal lymphadenopathy, bladder wall thickening, nonocclusive thrombus of the left ovarian vein, noncalcified pulmonary nodules.  11/29/2023: Endoscopic ultrasound with biopsy of the pancreas mass showed adenocarcinoma  12/2/2023: PET CT scan with evident hypermetabolic malignancy in the pancreas body with evidence for peritoneal carcinomatosis with multiple hypermetabolic peritoneal implants, indeterminant small 3 mm right lower lobe lung nodule.  She had a port placed by Dr. Crooks with surgical oncology earlier this week.  On 12/13/2023 she saw Dr. Catsillo with medical oncology at [facility].  A clinical trial may be available there.  She has been referred to genetic counseling due to an  extensive family history of breast and other cancers.  She states that she had BRCA testing done a number of years ago that was negative.  It appears that Dr. Castillo is evaluating for mutations otherwise as he evaluates her for candidacy of the clinical trial.  Otherwise he discussed palliative chemotherapy options including modified FOLFIRINOX and Gemzar/Abraxane.  She is also going to see medical oncologist at the [facility] next week as well.  She is seen again on 1/4/2024 having been seen at the [facility].  She is interested in treatment here with our group.  Plan modified FOLFIRINOX with growth factor support due to chronic leukopenia/neutropenia.  Plan modified FOLFIRINOX with growth factor support every 2 weeks at the usual doses.  Oxaliplatin 85 mg/m², irinotecan 150 mg/m², 5-FU 2400 mg/m² over 46 hours.  Neulasta on day 3 if approved by insurance.  Treatment is palliative and the patient understands this.  1/15/2024 cycle 1 FOLFIRINOX  January 24, 2024: Patient is gradually improving with nausea.  Patient still complains of pain in the left lower quadrant which is a 5 out of 10.  She continues to take Norco.  She has made it very clear that she does not want any further chemotherapy.  Her  is in agreement with that.  Patient is DNR and had lengthy discussion with both patient and .  Discussed with Dr. Boyer and is in agreement that her prognosis is not the best and it is okay for home hospice          *Chronic leukopenia/neutropenia  Followed by Dr. Marshall at [facility]  ANC is normal  1/4/2024 at 2.04  January 24, 2024: WBC 5.2, hemoglobin 9.7, platelet 120     *Cancer related pain: This is mild at this point we will continue to monitor.  She is not really taking any pain medication for this at this point.  Has been refusing pain meds, will encourage     *Anorexia and dysgeusia with weight loss  We will have her see our nutritionist     *Anxiety regarding her  health  We discussed potential referral to our supportive oncology program today but she prefers not to schedule this at this time.  We will continue to address this.     *Hypokalemia:  1/15/2024 potassium is 3.0.  She will be given 40 mEq p.o. potassium in the office today.  She also continues on home potassium 20 mEq daily.     Plan  Continue IV fluids  Replenish potassium  So far patient is not neutropenic or febrile  Continue Norco for pain  Palliative care has seen patient  Patient refuses any further chemotherapy  Will refer to hospice so that patient can go home with hospice  Patient will be discharged tomorrow with home hospice.  Okay from a point.  We can order the pain medicines tomorrow    Tarah Ortiz MD                    Cc:  Ju, Aleida Cavazos, *

## 2024-01-26 LAB
ANION GAP SERPL CALCULATED.3IONS-SCNC: 10.4 MMOL/L (ref 5–15)
BUN SERPL-MCNC: 3 MG/DL (ref 8–23)
BUN/CREAT SERPL: 4.8 (ref 7–25)
CALCIUM SPEC-SCNC: 8.9 MG/DL (ref 8.6–10.5)
CHLORIDE SERPL-SCNC: 102 MMOL/L (ref 98–107)
CO2 SERPL-SCNC: 23.6 MMOL/L (ref 22–29)
CREAT SERPL-MCNC: 0.63 MG/DL (ref 0.57–1)
DEPRECATED RDW RBC AUTO: 39.4 FL (ref 37–54)
EGFRCR SERPLBLD CKD-EPI 2021: 96.2 ML/MIN/1.73
ERYTHROCYTE [DISTWIDTH] IN BLOOD BY AUTOMATED COUNT: 12.4 % (ref 12.3–15.4)
GLUCOSE SERPL-MCNC: 104 MG/DL (ref 65–99)
HCT VFR BLD AUTO: 28.9 % (ref 34–46.6)
HGB BLD-MCNC: 9.4 G/DL (ref 12–15.9)
MAGNESIUM SERPL-MCNC: 1.6 MG/DL (ref 1.6–2.4)
MCH RBC QN AUTO: 28.2 PG (ref 26.6–33)
MCHC RBC AUTO-ENTMCNC: 32.5 G/DL (ref 31.5–35.7)
MCV RBC AUTO: 86.8 FL (ref 79–97)
PLATELET # BLD AUTO: 127 10*3/MM3 (ref 140–450)
PMV BLD AUTO: 10.3 FL (ref 6–12)
POTASSIUM SERPL-SCNC: 3.4 MMOL/L (ref 3.5–5.2)
POTASSIUM SERPL-SCNC: 3.5 MMOL/L (ref 3.5–5.2)
RBC # BLD AUTO: 3.33 10*6/MM3 (ref 3.77–5.28)
SODIUM SERPL-SCNC: 136 MMOL/L (ref 136–145)
WBC NRBC COR # BLD AUTO: 5.91 10*3/MM3 (ref 3.4–10.8)

## 2024-01-26 PROCEDURE — 83735 ASSAY OF MAGNESIUM: CPT | Performed by: HOSPITALIST

## 2024-01-26 PROCEDURE — 85027 COMPLETE CBC AUTOMATED: CPT | Performed by: HOSPITALIST

## 2024-01-26 PROCEDURE — 80048 BASIC METABOLIC PNL TOTAL CA: CPT | Performed by: HOSPITALIST

## 2024-01-26 PROCEDURE — 84132 ASSAY OF SERUM POTASSIUM: CPT | Performed by: HOSPITALIST

## 2024-01-26 PROCEDURE — 99231 SBSQ HOSP IP/OBS SF/LOW 25: CPT | Performed by: INTERNAL MEDICINE

## 2024-01-26 RX ORDER — POTASSIUM CHLORIDE 1.5 G/1.58G
40 POWDER, FOR SOLUTION ORAL EVERY 4 HOURS
Status: DISPENSED | OUTPATIENT
Start: 2024-01-26 | End: 2024-01-26

## 2024-01-26 RX ADMIN — ATORVASTATIN CALCIUM 10 MG: 20 TABLET, FILM COATED ORAL at 08:48

## 2024-01-26 RX ADMIN — ATENOLOL 50 MG: 50 TABLET ORAL at 22:18

## 2024-01-26 RX ADMIN — FLUCONAZOLE 200 MG: 200 TABLET ORAL at 14:34

## 2024-01-26 RX ADMIN — CYCLOSPORINE 1 DROP: 0.5 EMULSION OPHTHALMIC at 22:18

## 2024-01-26 RX ADMIN — HYDROCODONE BITARTRATE AND ACETAMINOPHEN 1 TABLET: 5; 325 TABLET ORAL at 22:17

## 2024-01-26 RX ADMIN — ASPIRIN 81 MG: 81 TABLET, COATED ORAL at 08:47

## 2024-01-26 RX ADMIN — OLANZAPINE 2.5 MG: 2.5 TABLET, FILM COATED ORAL at 22:18

## 2024-01-26 RX ADMIN — AMLODIPINE BESYLATE 10 MG: 10 TABLET ORAL at 08:49

## 2024-01-26 NOTE — CASE MANAGEMENT/SOCIAL WORK
Continued Stay Note  [facility]     Patient Name: Eveline Govea  MRN: [id number]  Today's Date: 1/26/2024    Admit Date: 1/19/2024    Plan: Home with family to transport.   Discharge Plan       Row Name 01/26/24 1256       Plan    Plan Home with family to transport.    Plan Comments Spoke with patient and spouse at bedside. Patient's plan is to return home and reach out to Hospice services after getting home.                   Discharge Codes    No documentation.                 Expected Discharge Date and Time       Expected Discharge Date Expected Discharge Time    Jan 26, 2024               Roxi Hernandez RN

## 2024-01-26 NOTE — PLAN OF CARE
Problem: Adult Inpatient Plan of Care  Goal: Plan of Care Review  Outcome: Ongoing, Progressing     Problem: Adult Inpatient Plan of Care  Goal: Patient-Specific Goal (Individualized)  Outcome: Ongoing, Progressing     Problem: Adult Inpatient Plan of Care  Goal: Absence of Hospital-Acquired Illness or Injury  Outcome: Ongoing, Progressing  Intervention: Identify and Manage Fall Risk  Recent Flowsheet Documentation  Taken 1/26/2024 1630 by Monse Villanueva RN  Safety Promotion/Fall Prevention:   safety round/check completed   room organization consistent  Taken 1/26/2024 1415 by Monse Villanueva RN  Safety Promotion/Fall Prevention:   room organization consistent   safety round/check completed  Taken 1/26/2024 1200 by Monse Villanueva RN  Safety Promotion/Fall Prevention:   safety round/check completed   room organization consistent  Taken 1/26/2024 1000 by Monse Villanueva RN  Safety Promotion/Fall Prevention:   safety round/check completed   room organization consistent  Taken 1/26/2024 0830 by Monse Villanueva RN  Safety Promotion/Fall Prevention:   safety round/check completed   room organization consistent  Intervention: Prevent and Manage VTE (Venous Thromboembolism) Risk  Recent Flowsheet Documentation  Taken 1/26/2024 1630 by Monse Villanueva RN  Activity Management: up ad cameron  Taken 1/26/2024 1415 by Monse Villanueva RN  Activity Management: up ad cameron  Taken 1/26/2024 1200 by Monse Villanueva RN  Activity Management: up ad cameron  Taken 1/26/2024 1000 by Monse Villanueva RN  Activity Management: up ad cameron  Taken 1/26/2024 0830 by Monse Villanueva RN  Activity Management: up ad cameron  VTE Prevention/Management:   bilateral   sequential compression devices off   Goal Outcome Evaluation:  AOX4. VSS.  Remains afebrile.  Up ad cameron.  Significant other at bedside.  Poor appetite persists.  Plan is for patient to go home and will admit to hospice on patient's initiation.  MD aware that patient has no IV access.  Anticipate discharge home  tomorrow.

## 2024-01-26 NOTE — PROGRESS NOTES
Subjective .     REASONS FOR FOLLOWUP:  Metastatic pancreatic cancer     Interval history:  January 21, 2024: Patient is s/p cycle 1 FOLFIRINOX with growth factor support  Hemoglobin 9.5, white count of 9.41 and platelet of 91.  Afebrile    Patient states she is in pain.  Her level of pain is 5 on a 1-10 scale in the abdomen.  We will try morphine PA.  2 mg IV every 4 hours as needed    January 22, 2024: Patient has a low-grade temperature 99 rest of vital signs are stable.  Hemoglobin down to 9.6 white count of 8.14, platelet of 96.  Patient apparently does not want to continue any further chemotherapy as she is feeling worse with chemotherapy.  However patient's  wants her to continue chemotherapy.  Palliative care nurse has been consulted.     January 23, 2024: Palliative care nurse met with patient.  Patient is not interested to continue any further treatment.  But her  and children prefer her to have treatment.  She also wants to discuss resuscitation that status with her  she prefers to be DNR  Able CBC with a white count of 5.11 hemoglobin of 9.6 and a platelet of 99.  Will need to discuss with Dr. Boyer.  If patient is discharged she can follow-up with Dr. Boyer    January 24, 2024: Patient is afebrile, vital signs stable.  Nausea improved.  Pain 5 out of 10 on the left flank and was refusing to take pain meds.  I discussed with patient and patient's .  Both are in agreement for no resuscitation.  And patient has made it very clear that she does not want any chemotherapy.  Discussion done with Dr. Boyer and he is in agreement with that.  Will consult palliative care and hospice    January 25, 2024: Discussed in length with patient and patient's .  They want to go home with hospice.  Currently decreased the Norco from 7.5 mg - 5 mg.  Every 4 hours    1/26 pt with one episode of fever.     HISTORY OF PRESENT ILLNESS:    Patient is a 68-year-old female who has been diagnosed  with metastatic adenocarcinoma the pancreas with carcinomatosis.  She was seen by our nurse practitioner Cata on January 15, 2024.  Patient was diagnosed back in November 2023 with peritoneal carcinomatosis.  And had primary pancreatic malignancy with history of pancreatic extension and vascular involvement with shotty retroperitoneal lymphadenopathy.  Patient also had a bladder wall thickening and nonocclusive thrombus of the left ovarian vein and noncalcified pulmonary nodules.     Had shown evidence of peritoneal carcinomatosis with multiple hypermetabolic peritoneal implants     There was history of extensive family history of breast cancer and patient underwent genetic testing.     Was started on modified FOLFIRINOX with growth factor support every 2 weeks starting January 15, 2024 when cycle 1 was given.     And also has history of chronic leukopenia and neutropenia followed by Dr. Finley     Patient is followed by Dr. Boyer in our office.     Patient was followed by Cecilia Saba on January 17.  She came for unhooking of the chemotherapy.  She has not been eating well.  She just to cut chocolate Ensure.  She called our office January 19 as she has not been eating or drinking.  Not nauseated.  Got admitted because of lack of p.o. intake, severe fatigue nausea weakness and some abdominal discomfort.  Globin on admission was 11.5, white count of 14.85 and platelet count of 142.  Her potassium was 2.8     This morning her hemoglobin is down to 10.2 with a platelet of 117 and white count of 13 she is afebrile and blood pressure stable at 116 x 62 and oxygen saturation is 100%.  Chest x-ray negative.      Past Medical History:   Diagnosis Date    Arthritis     H/O mammogram 09/01/2015    Dr Lizzy Gary    Hypertension     Lupus     Pancreatic cancer 2023       ONCOLOGIC HISTORY:  (History from previous dates can be found in the separate document.)    No current facility-administered medications on file prior to  encounter.     Current Outpatient Medications on File Prior to Encounter   Medication Sig Dispense Refill    amLODIPine (NORVASC) 10 MG tablet Take 1 tablet by mouth Daily.      aspirin 81 MG tablet Take 1 tablet by mouth Daily.      atenolol-chlorthalidone (TENORETIC) 50-25 MG per tablet Take 1 tablet by mouth Daily.      Biotin 5000 MCG tablet Take 1 tablet by mouth daily.      CALCIUM PO Take 2 tablets by mouth daily.      loratadine (CLARITIN) 10 MG tablet Take 1 tablet by mouth Daily.      lovastatin (MEVACOR) 20 MG tablet Take 1 tablet by mouth every night. 90 tablet 3    RESTASIS 0.05 % ophthalmic emulsion Administer 1 drop to both eyes 2 (Two) Times a Day.      betamethasone dipropionate (DIPROLENE) 0.05 % ointment Apply  topically daily as needed. (Patient not taking: Reported on 1/19/2024)      Diclofenac Sodium (VOLTAREN) 1 % gel gel Apply 4 g topically to the appropriate area as directed. (Patient not taking: Reported on 1/19/2024)      lidocaine-prilocaine (EMLA) 2.5-2.5 % cream APPLY NICKEL SIZE AMOUNT DIRECTLY ON PORT SITE 30-60 MINUTES PRIOR TO HAVING PORT ACCESSED. COVER WITH SARAN WRAP. 30 g 1    Omega-3 Fatty Acids (FISH OIL) 1000 MG capsule capsule Take 1 capsule by mouth Daily With Breakfast. (Patient not taking: Reported on 1/19/2024)      ondansetron (ZOFRAN) 8 MG tablet Take 1 tablet by mouth 3 (Three) Times a Day As Needed for Nausea or Vomiting. 30 tablet 5    phenazopyridine (PYRIDIUM) 200 MG tablet  (Patient not taking: Reported on 1/19/2024)      potassium chloride (K-DUR,KLOR-CON) 20 MEQ CR tablet Take 1 tablet by mouth daily. (Patient not taking: Reported on 1/19/2024) 90 tablet 3    sulfamethoxazole-trimethoprim (BACTRIM DS,SEPTRA DS) 800-160 MG per tablet  (Patient not taking: Reported on 1/19/2024)         ALLERGIES:     Allergies   Allergen Reactions    Ace Inhibitors        Social History     Socioeconomic History    Marital status:     Number of children: 2   Tobacco Use     Smoking status: Never   Vaping Use    Vaping Use: Never used   Substance and Sexual Activity    Alcohol use: No    Drug use: Never    Sexual activity: Defer         Cancer-related family history includes Breast cancer in her mother, sister, and sister; Cancer in her mother and sister.     Review of Systems  A comprehensive 14 point review of systems was performed and was negative except as mentioned.  Patient has abdominal pain    Objective      Vitals:    01/26/24 0411 01/26/24 0739 01/26/24 1115 01/26/24 1624   BP: 123/66 111/64 112/67 126/72   BP Location: Right arm Right arm Right arm Right arm   Patient Position: Lying Lying Lying Lying   Pulse: 71 72 83 85   Resp: 18 18 18 18   Temp: 98.1 °F (36.7 °C) 98.4 °F (36.9 °C) 98.6 °F (37 °C) 98.6 °F (37 °C)   TempSrc: Oral Oral Oral Oral   SpO2: 97% 97% 96% 97%   Weight:       Height:             1/17/2024     1:16 PM   Current Status   ECOG score 0       Physical Exam    RESPIRATORY:  Normal respiratory effort.  No rales  or wheezing, clear.   CARDIOVASCULAR:  Regular rate and rhythm, no murmur  No significant lower extremity edema.  Abdomen: Soft nontender positive bowel sounds no hepatosplenomegaly  SKIN: No wounds.  No rashes.  MUSCULOSKELETAL/EXTREMITIES: No clubbing or cyanosis.  No apparent unilateral weakness.  NEURO: CN 2-12 appear intact. No focal neurological deficits noted.  PSYCHIATRIC:  Normal judgment and insight.  Normal mood and affect.      RECENT LABS:  Hematology WBC   Date Value Ref Range Status   01/26/2024 5.91 3.40 - 10.80 10*3/mm3 Final     RBC   Date Value Ref Range Status   01/26/2024 3.33 (L) 3.77 - 5.28 10*6/mm3 Final     Hemoglobin   Date Value Ref Range Status   01/26/2024 9.4 (L) 12.0 - 15.9 g/dL Final     Hematocrit   Date Value Ref Range Status   01/26/2024 28.9 (L) 34.0 - 46.6 % Final     Platelets   Date Value Ref Range Status   01/26/2024 127 (L) 140 - 450 10*3/mm3 Final        Assessment & Plan       *.  Admitted with  dehydration, decreased p.o. intake, hypokalemia following treatment with FOLFIRINOX on January 15, 2024 by Dr. Boyer.  Cycle 1 given  Very poor intake  Hypotensive when admitted now with normal blood pressure  Patient expressed concerns of not continuing any further treatment  Palliative care met with her.  She is going to discuss with her family and decide on the resuscitation status and also prefers not to  January 24, 2024: Nausea improved.  Pain 5 out of 10, has been refusing pain meds.  Has been wanting to stop chemotherapy but will encourage to consider less aggressive chemo as family really wants to try.  Will discuss with family in length     *.  Anemia and Thrombocytopenia not neutropenic yet    *.  Abdominal pain: 4 or 5 on a 1-10 scale.  We will try IV morphine for pain.  She did have loose stools today.  Currently on IV morphine as needed        *Metastatic adenocarcinoma of the pancreas with carcinomatosis  She presented with a several month history of abdominal pain.  11/22/2023: CT abdomen and pelvis with peritoneal carcinomatosis, small volume ascites, abnormal appearance of the body and tail of the pancreas consistent with primary pancreas malignancy with extrapancreatic extension and vascular involvement, shotty retroperitoneal lymphadenopathy, bladder wall thickening, nonocclusive thrombus of the left ovarian vein, noncalcified pulmonary nodules.  11/29/2023: Endoscopic ultrasound with biopsy of the pancreas mass showed adenocarcinoma  12/2/2023: PET CT scan with evident hypermetabolic malignancy in the pancreas body with evidence for peritoneal carcinomatosis with multiple hypermetabolic peritoneal implants, indeterminant small 3 mm right lower lobe lung nodule.  She had a port placed by Dr. Crooks with surgical oncology earlier this week.  On 12/13/2023 she saw Dr. Castillo with medical oncology at [facility].  A clinical trial may be available there.  She has been referred to genetic counseling due  to an extensive family history of breast and other cancers.  She states that she had BRCA testing done a number of years ago that was negative.  It appears that Dr. Castillo is evaluating for mutations otherwise as he evaluates her for candidacy of the clinical trial.  Otherwise he discussed palliative chemotherapy options including modified FOLFIRINOX and Gemzar/Abraxane.  She is also going to see medical oncologist at the [facility] next week as well.  She is seen again on 1/4/2024 having been seen at the [facility].  She is interested in treatment here with our group.  Plan modified FOLFIRINOX with growth factor support due to chronic leukopenia/neutropenia.  Plan modified FOLFIRINOX with growth factor support every 2 weeks at the usual doses.  Oxaliplatin 85 mg/m², irinotecan 150 mg/m², 5-FU 2400 mg/m² over 46 hours.  Neulasta on day 3 if approved by insurance.  Treatment is palliative and the patient understands this.  1/15/2024 cycle 1 FOLFIRINOX  January 24, 2024: Patient is gradually improving with nausea.  Patient still complains of pain in the left lower quadrant which is a 5 out of 10.  She continues to take Norco.  She has made it very clear that she does not want any further chemotherapy.  Her  is in agreement with that.  Patient is DNR and had lengthy discussion with both patient and .  Discussed with Dr. Boyer and is in agreement that her prognosis is not the best and it is okay for home hospice          *Chronic leukopenia/neutropenia  Followed by Dr. Marshall at [facility]  ANC is normal  1/4/2024 at 2.04  January 24, 2024: WBC 5.2, hemoglobin 9.7, platelet 120     *Cancer related pain: This is mild at this point we will continue to monitor.  She is not really taking any pain medication for this at this point.  Has been refusing pain meds, will encourage     *Anorexia and dysgeusia with weight loss  We will have her see our nutritionist     *Anxiety regarding her  health  We discussed potential referral to our supportive oncology program today but she prefers not to schedule this at this time.  We will continue to address this.     *Hypokalemia:  1/15/2024 potassium is 3.0.  She will be given 40 mEq p.o. potassium in the office today.  She also continues on home potassium 20 mEq daily.     Plan  Continue IV fluids  Replenish potassium  So far patient is not neutropenic or febrile  Continue Norco for pain  Palliative care has seen patient  Patient refuses any further chemotherapy  Will refer to hospice so that patient can go home with hospice  Patient will be discharged tomorrow with home hospice.  Okay from a point.  We can order the pain medicines tomorrow    Ok to discarge from our point. Home with [facility]      Tarah Ortiz MD                    Cc:  Ju, Aleida Cavazos, *

## 2024-01-26 NOTE — PROGRESS NOTES
Name: Eveline Govea ADMIT: 2024   : 1955  PCP: Rosibel Seymour MD    MRN: [id number] LOS: 6 days   AGE/SEX: 68 y.o. female  ROOM: [facility]     Subjective   Subjective   Feeling okay again today. Still c/o dry mouth and difficulty tolerating po as a result--tolerated dinner a little better last night though. No pain with swallowing. Thrush improving. Abd pain and nausea are only intermittent and mild. Had fever last night but was not aware.       Objective   Objective   Vital Signs  Temp:  [98.1 °F (36.7 °C)-100.8 °F (38.2 °C)] 98.1 °F (36.7 °C)  Heart Rate:  [71-90] 71  Resp:  [16-18] 18  BP: (116-127)/(66-73) 123/66  SpO2:  [97 %-99 %] 97 %  on   ;   Device (Oxygen Therapy): room air  Body mass index is 22.46 kg/m².    (No change in exam today)    Physical Exam  Vitals and nursing note reviewed. Exam conducted with a chaperone present ().   Constitutional:       General: She is not in acute distress.     Appearance: She is ill-appearing. She is not toxic-appearing or diaphoretic.   HENT:      Head: Normocephalic.      Nose: Nose normal.      Mouth/Throat:      Mouth: Mucous membranes are moist.      Pharynx: Oropharynx is clear.   Eyes:      General: No scleral icterus.        Right eye: No discharge.         Left eye: No discharge.      Extraocular Movements: Extraocular movements intact.      Conjunctiva/sclera: Conjunctivae normal.   Cardiovascular:      Rate and Rhythm: Normal rate and regular rhythm.      Pulses: Normal pulses.   Pulmonary:      Effort: Pulmonary effort is normal. No respiratory distress.      Breath sounds: Normal breath sounds. No wheezing or rales.   Abdominal:      General: Bowel sounds are normal. There is distension (mild).      Palpations: Abdomen is soft.      Tenderness: There is no abdominal tenderness.   Musculoskeletal:         General: No swelling or deformity. Normal range of motion.      Cervical back: Neck supple.   Skin:     General: Skin is warm  "and dry.      Capillary Refill: Capillary refill takes less than 2 seconds.      Coloration: Skin is not jaundiced.   Neurological:      General: No focal deficit present.      Mental Status: She is alert and oriented to person, place, and time. Mental status is at baseline.      Cranial Nerves: No cranial nerve deficit.      Coordination: Coordination normal.   Psychiatric:         Mood and Affect: Mood normal.         Behavior: Behavior normal.         Thought Content: Thought content normal.       Results Review     I reviewed the patient's new clinical results.  Results from last 7 days   Lab Units 01/26/24  0645 01/24/24  0654 01/23/24  0636 01/22/24  0554   WBC 10*3/mm3 5.91 5.20 5.11 8.14   HEMOGLOBIN g/dL 9.4* 9.7* 9.6* 9.6*   PLATELETS 10*3/mm3 127* 120* 99* 96*     Results from last 7 days   Lab Units 01/26/24  0645 01/24/24  0654 01/23/24  0636 01/22/24  0553   SODIUM mmol/L 136 136 133* 131*   POTASSIUM mmol/L 3.5 3.9 3.8 3.7   CHLORIDE mmol/L 102 103 100 98   CO2 mmol/L 23.6 22.5 23.0 20.5*   BUN mg/dL 3* <2* 3* 5*   CREATININE mg/dL 0.63 0.55* 0.51* 0.46*   GLUCOSE mg/dL 104* 130* 144* 96   EGFR mL/min/1.73 96.2 99.4 101.2 103.7     Results from last 7 days   Lab Units 01/24/24  0654 01/23/24  0636 01/22/24  0553 01/21/24  0619   ALBUMIN g/dL 3.1* 3.2* 2.8* 3.2*   BILIRUBIN mg/dL <0.2 <0.2 0.2 0.2   ALK PHOS U/L 72 74 81 73   AST (SGOT) U/L 26 22 17 21   ALT (SGPT) U/L 15 12 11 16     Results from last 7 days   Lab Units 01/26/24  0645 01/24/24  0654 01/23/24  0636 01/22/24  0553 01/21/24  0619 01/20/24  0704 01/19/24  1737   CALCIUM mg/dL 8.9 8.5* 8.2* 7.9* 8.0* 8.3* 9.4   ALBUMIN g/dL  --  3.1* 3.2* 2.8* 3.2*  --  3.9   MAGNESIUM mg/dL 1.6  --   --   --   --  2.5* 1.5*       No results found for: \"HGBA1C\", \"POCGLU\"    No radiology results for the last day    I have personally reviewed all medications:  Scheduled Medications  amLODIPine, 10 mg, Oral, Daily  aspirin, 81 mg, Oral, Daily  atenolol, 50 " mg, Oral, Q24H  atorvastatin, 10 mg, Oral, Daily  cetirizine, 10 mg, Oral, Daily  cycloSPORINE, 1 drop, Both Eyes, BID  fluconazole, 200 mg, Oral, Q24H  OLANZapine, 2.5 mg, Oral, Nightly  potassium chloride, 20 mEq, Oral, Daily  senna-docusate sodium, 2 tablet, Oral, BID    Infusions     Diet  Diet: Regular/House Diet; Texture: Regular Texture (IDDSI 7); Fluid Consistency: Thin (IDDSI 0)    I have personally reviewed:  [x]  Laboratory   []  Microbiology   []  Radiology   []  EKG/Telemetry  []  Cardiology/Vascular   []  Pathology    []  Records       Assessment/Plan     Active Hospital Problems    Diagnosis  POA    **Malignant neoplasm of other parts of pancreas [C25.7]  Yes    Thrush [B37.0]  Yes    Essential hypertension [I10]  Yes    SLE (systemic lupus erythematosus) [M32.9]  Yes      Resolved Hospital Problems    Diagnosis Date Resolved POA    Hypokalemia [E87.6] 01/23/2024 Yes       67yo woman with metastatic pancreatic cancer, just started cycle 1 of chemo, admitted with hypokalemia.     Dehydration and electrolyte abnormalities due to chemo and anorexia:    - Continue Zyprexa for appetite stimulation.    - Stopped IVFs, encourage po fluids.   - Heme/Onc note reviewed. Pt has decided on no further treatment, plan is home with hospice--likely tomorrow  - Continue current PRN pain meds--not requiring much at all.  - Continue current PRN Zofran     Thrush:  improving on Diflucan.     Anemia/thrombocytopenia:  stable.       HTN: Holding chlorthalidone given dehydration--would not restart as BPs acceptable without it. Continue amlodipine and atenolol.    Fever: asymptomatic, exam unchanged, if fever recurs would workup. Would like for her to be fever-free for at least 24h before we dc her. She and  agree with this plan.      SCDs for DVT prophylaxis.  Full code.  Discussed with patient and .  Anticipate discharge home with family and hospice tomorrow AM if continues to do well and no more  fever.      Cuauhtemoc Bentley MD  [facility]  01/26/24  08:31 EST

## 2024-01-27 VITALS
SYSTOLIC BLOOD PRESSURE: 120 MMHG | BODY MASS INDEX: 22.46 KG/M2 | OXYGEN SATURATION: 96 % | HEART RATE: 73 BPM | HEIGHT: 63 IN | DIASTOLIC BLOOD PRESSURE: 64 MMHG | WEIGHT: 126.76 LBS | TEMPERATURE: 99 F | RESPIRATION RATE: 16 BRPM

## 2024-01-27 LAB
ALBUMIN SERPL-MCNC: 3.2 G/DL (ref 3.5–5.2)
ALBUMIN/GLOB SERPL: 1.1 G/DL
ALP SERPL-CCNC: 74 U/L (ref 39–117)
ALT SERPL W P-5'-P-CCNC: 29 U/L (ref 1–33)
ANION GAP SERPL CALCULATED.3IONS-SCNC: 11 MMOL/L (ref 5–15)
AST SERPL-CCNC: 38 U/L (ref 1–32)
BILIRUB SERPL-MCNC: <0.2 MG/DL (ref 0–1.2)
BUN SERPL-MCNC: 3 MG/DL (ref 8–23)
BUN/CREAT SERPL: 5 (ref 7–25)
CALCIUM SPEC-SCNC: 9.1 MG/DL (ref 8.6–10.5)
CHLORIDE SERPL-SCNC: 103 MMOL/L (ref 98–107)
CO2 SERPL-SCNC: 23 MMOL/L (ref 22–29)
CREAT SERPL-MCNC: 0.6 MG/DL (ref 0.57–1)
DEPRECATED RDW RBC AUTO: 37.6 FL (ref 37–54)
EGFRCR SERPLBLD CKD-EPI 2021: 97.3 ML/MIN/1.73
ERYTHROCYTE [DISTWIDTH] IN BLOOD BY AUTOMATED COUNT: 12.1 % (ref 12.3–15.4)
GLOBULIN UR ELPH-MCNC: 2.8 GM/DL
GLUCOSE SERPL-MCNC: 107 MG/DL (ref 65–99)
HCT VFR BLD AUTO: 29.2 % (ref 34–46.6)
HGB BLD-MCNC: 9.4 G/DL (ref 12–15.9)
MAGNESIUM SERPL-MCNC: 1.6 MG/DL (ref 1.6–2.4)
MCH RBC QN AUTO: 27.6 PG (ref 26.6–33)
MCHC RBC AUTO-ENTMCNC: 32.2 G/DL (ref 31.5–35.7)
MCV RBC AUTO: 85.6 FL (ref 79–97)
PLATELET # BLD AUTO: 133 10*3/MM3 (ref 140–450)
PMV BLD AUTO: 10.8 FL (ref 6–12)
POTASSIUM SERPL-SCNC: 3.9 MMOL/L (ref 3.5–5.2)
PROT SERPL-MCNC: 6 G/DL (ref 6–8.5)
RBC # BLD AUTO: 3.41 10*6/MM3 (ref 3.77–5.28)
SODIUM SERPL-SCNC: 137 MMOL/L (ref 136–145)
WBC NRBC COR # BLD AUTO: 6.51 10*3/MM3 (ref 3.4–10.8)

## 2024-01-27 PROCEDURE — 83735 ASSAY OF MAGNESIUM: CPT | Performed by: HOSPITALIST

## 2024-01-27 PROCEDURE — 85027 COMPLETE CBC AUTOMATED: CPT | Performed by: HOSPITALIST

## 2024-01-27 PROCEDURE — 80053 COMPREHEN METABOLIC PANEL: CPT | Performed by: HOSPITALIST

## 2024-01-27 RX ORDER — HYDROCODONE BITARTRATE AND ACETAMINOPHEN 5; 325 MG/1; MG/1
1 TABLET ORAL EVERY 4 HOURS PRN
Qty: 20 TABLET | Refills: 0 | Status: SHIPPED | OUTPATIENT
Start: 2024-01-27

## 2024-01-27 RX ORDER — POTASSIUM CHLORIDE 20 MEQ/1
20 TABLET, EXTENDED RELEASE ORAL DAILY
Qty: 30 TABLET | Refills: 0 | Status: SHIPPED | OUTPATIENT
Start: 2024-01-27

## 2024-01-27 RX ORDER — MAGNESIUM SULFATE HEPTAHYDRATE 40 MG/ML
2 INJECTION, SOLUTION INTRAVENOUS ONCE
Status: DISCONTINUED | OUTPATIENT
Start: 2024-01-27 | End: 2024-01-27

## 2024-01-27 RX ORDER — OLANZAPINE 2.5 MG/1
2.5 TABLET, FILM COATED ORAL NIGHTLY
Qty: 30 TABLET | Refills: 0 | Status: SHIPPED | OUTPATIENT
Start: 2024-01-27

## 2024-01-27 RX ORDER — ATENOLOL 50 MG/1
50 TABLET ORAL
Qty: 30 TABLET | Refills: 0 | Status: SHIPPED | OUTPATIENT
Start: 2024-01-27

## 2024-01-27 RX ORDER — FLUCONAZOLE 200 MG/1
200 TABLET ORAL EVERY 24 HOURS
Qty: 2 TABLET | Refills: 0 | Status: SHIPPED | OUTPATIENT
Start: 2024-01-27 | End: 2024-01-29

## 2024-01-27 RX ADMIN — AMLODIPINE BESYLATE 10 MG: 10 TABLET ORAL at 08:42

## 2024-01-27 RX ADMIN — CYCLOSPORINE 1 DROP: 0.5 EMULSION OPHTHALMIC at 08:43

## 2024-01-27 NOTE — PLAN OF CARE
Goal Outcome Evaluation:         Pt rested well thru out the night. Pt only requested pain medication once tonight so far. HOB 20-30 degrees, pt resting w/o any complications.  remains at the bedside.

## 2024-01-27 NOTE — DISCHARGE SUMMARY
Patient Name: Eveline Govea  : 1955  MRN: [id number]    Date of Admission: 2024  Date of Discharge:  2024  Primary Care Physician: Rosibel Seymour MD      Chief Complaint:   Weakness - Generalized and Nausea      Discharge Diagnoses     Active Hospital Problems    Diagnosis  POA    **Malignant neoplasm of other parts of pancreas [C25.7]  Yes    Thrush [B37.0]  Yes    Essential hypertension [I10]  Yes    SLE (systemic lupus erythematosus) [M32.9]  Yes      Resolved Hospital Problems    Diagnosis Date Resolved POA    Hypokalemia [E87.6] 2024 Yes        Hospital Course     Very pleasant 69yo woman with metastatic pancreatic cancer, just started cycle 1 of chemo, admitted with hypokalemia. Please see below for details of admission:     Dehydration and electrolyte abnormalities due to chemo and anorexia:    - Continue Zyprexa for appetite stimulation.    - Stopped IVFs, encouraged po fluids.   - Heme/Onc note reviewed. Pt has decided on no further treatment, plan is home with hospice  - Continue current PRN pain meds--not requiring much at all here. Home with Norco #20 today. Further meds through [facility].  - Continue current PRN Zofran (has rx at home)     Thrush:  improving on Diflucan. Will complete course at home.     Anemia/thrombocytopenia:  Hgb stable.       HTN: Held chlorthalidone given dehydration--will not restart at dc as BPs acceptable without it. Continued amlodipine and atenolol.     Fever: isolated fever on . Has not recurred. WBC wnl. No s/sx of infection or neutropenia. Okay for dc.        SCDs for DVT prophylaxis while here.  DNR.  Discussed with patient and .  Discharge home with family and hospice today  F/u with PCP in a week.  No further f/u with Heme/Onc planned per Dr. Ortiz.    Day of Discharge     Subjective:  Feeling okay again today. Still c/o dry mouth and difficulty tolerating po as a result--tolerating po a little better though now as  thrush is treated. No pain with swallowing. Abd pain and nausea are only intermittent and mild. Very eager to go home.    Physical Exam:  Temp:  [98 °F (36.7 °C)-99.4 °F (37.4 °C)] 99 °F (37.2 °C)  Heart Rate:  [73-85] 73  Resp:  [16-18] 16  BP: (112-126)/(63-72) 120/64  Body mass index is 22.46 kg/m².  Physical Exam  Vitals and nursing note reviewed. Exam conducted with a chaperone present ().   Constitutional:       General: She is not in acute distress.     Appearance: She is ill-appearing. She is not toxic-appearing or diaphoretic.   Cardiovascular:      Rate and Rhythm: Normal rate and regular rhythm.      Pulses: Normal pulses.   Pulmonary:      Effort: Pulmonary effort is normal. No respiratory distress.      Breath sounds: Normal breath sounds. No wheezing or rales.   Abdominal:      General: Bowel sounds are normal. There is distension (mild).      Palpations: Abdomen is soft.      Tenderness: There is no abdominal tenderness.   Musculoskeletal:         General: No swelling or deformity. Normal range of motion.      Cervical back: Neck supple.   Skin:     General: Skin is warm and dry.      Capillary Refill: Capillary refill takes less than 2 seconds.      Coloration: Skin is not jaundiced.   Neurological:      General: No focal deficit present.      Mental Status: She is alert and oriented to person, place, and time. Mental status is at baseline.      Cranial Nerves: No cranial nerve deficit.      Coordination: Coordination normal.   Psychiatric:         Mood and Affect: Mood normal.         Behavior: Behavior normal.         Thought Content: Thought content normal.      Consultants     Consult Orders (all) (From admission, onward)       Start     Ordered    01/24/24 1615  Inpatient Hospice / [facility] Consult  Once        Specialty:  Hospice and Palliative Medicine  Provider:  (Not yet assigned)    01/24/24 1614    01/21/24 0919  Inpatient Palliative Care Team Consult  Once        Provider:  (Not  yet assigned)    01/21/24 0919    01/20/24 2140  Inpatient Palliative Care Team Consult  Once        Provider:  (Not yet assigned)    01/20/24 2140 01/19/24 2129  Inpatient Nutrition Consult  Once        Provider:  (Not yet assigned)    01/19/24 2128 01/19/24 1900  Hematology and Oncology (on-call MD unless specified)  Once        Specialty:  Hematology and Oncology  Provider:  Ashkan Boyer MD    01/19/24 1859                  Procedures     * Surgery not found *    Imaging Results (All)       Procedure Component Value Units Date/Time    XR Chest 1 View [[id number]] Collected: 01/19/24 1802     Updated: 01/19/24 1805    Narrative:      PORTABLE CHEST X-RAY     HISTORY: Fatigue.     Portable chest x-ray is provided. Correlation: None.     FINDINGS: Right IJ Mediport catheter. The cardiomediastinal silhouette  is normal. The lungs are clear. The costophrenic sulci are dry and the  bones appear normal. There is no pneumothorax.       Impression:      Negative.     This report was finalized on 1/19/2024 6:02 PM by Dr. Cuauhtemoc Juarez M.D on Workstation: [id number]                 Pertinent Labs     Results from last 7 days   Lab Units 01/27/24  0637 01/26/24  0645 01/24/24  0654 01/23/24  0636   WBC 10*3/mm3 6.51 5.91 5.20 5.11   HEMOGLOBIN g/dL 9.4* 9.4* 9.7* 9.6*   PLATELETS 10*3/mm3 133* 127* 120* 99*     Results from last 7 days   Lab Units 01/27/24  0637 01/26/24 2127 01/26/24  0645 01/24/24  0654 01/23/24  0636   SODIUM mmol/L 137  --  136 136 133*   POTASSIUM mmol/L 3.9 3.4* 3.5 3.9 3.8   CHLORIDE mmol/L 103  --  102 103 100   CO2 mmol/L 23.0  --  23.6 22.5 23.0   BUN mg/dL 3*  --  3* <2* 3*   CREATININE mg/dL 0.60  --  0.63 0.55* 0.51*   GLUCOSE mg/dL 107*  --  104* 130* 144*   EGFR mL/min/1.73 97.3  --  96.2 99.4 101.2     Results from last 7 days   Lab Units 01/27/24  0637 01/24/24  0654 01/23/24  0636 01/22/24  0553   ALBUMIN g/dL 3.2* 3.1* 3.2* 2.8*   BILIRUBIN mg/dL <0.2 <0.2 <0.2 0.2   ALK PHOS U/L  "74 72 74 81   AST (SGOT) U/L 38* 26 22 17   ALT (SGPT) U/L 29 15 12 11     Results from last 7 days   Lab Units 01/27/24  0637 01/26/24  0645 01/24/24  0654 01/23/24  0636 01/22/24  0553   CALCIUM mg/dL 9.1 8.9 8.5* 8.2* 7.9*   ALBUMIN g/dL 3.2*  --  3.1* 3.2* 2.8*   MAGNESIUM mg/dL 1.6 1.6  --   --   --                Invalid input(s): \"LDLCALC\"          Test Results Pending at Discharge       Discharge Details        Discharge Medications        New Medications        Instructions Start Date   atenolol 50 MG tablet  Commonly known as: TENORMIN  Replaces: atenolol-chlorthalidone 50-25 MG per tablet   50 mg, Oral, Every 24 Hours Scheduled      fluconazole 200 MG tablet  Commonly known as: DIFLUCAN   200 mg, Oral, Every 24 Hours      HYDROcodone-acetaminophen 5-325 MG per tablet  Commonly known as: NORCO   1 tablet, Oral, Every 4 Hours PRN      OLANZapine 2.5 MG tablet  Commonly known as: zyPREXA   2.5 mg, Oral, Nightly             Continue These Medications        Instructions Start Date   amLODIPine 10 MG tablet  Commonly known as: NORVASC   1 tablet, Oral, Daily      aspirin 81 MG tablet   1 tablet, Oral, Daily      Biotin 5000 MCG tablet   1 tablet, Oral, Daily      CALCIUM PO   2 tablets, Oral, Daily      lidocaine-prilocaine 2.5-2.5 % cream  Commonly known as: EMLA   APPLY NICKEL SIZE AMOUNT DIRECTLY ON PORT SITE 30-60 MINUTES PRIOR TO HAVING PORT ACCESSED. COVER WITH SARAN WRAP.      loratadine 10 MG tablet  Commonly known as: CLARITIN   1 tablet, Oral, Daily      lovastatin 20 MG tablet  Commonly known as: MEVACOR   20 mg, Oral, Nightly      ondansetron 8 MG tablet  Commonly known as: ZOFRAN   8 mg, Oral, 3 Times Daily PRN      potassium chloride 20 MEQ CR tablet  Commonly known as: K-DUR,KLOR-CON   20 mEq, Oral, Daily      Restasis 0.05 % ophthalmic emulsion  Generic drug: cycloSPORINE   1 drop, Both Eyes, 2 Times Daily             Stop These Medications      atenolol-chlorthalidone 50-25 MG per " tablet  Commonly known as: TENORETIC  Replaced by: atenolol 50 MG tablet     betamethasone dipropionate 0.05 % ointment  Commonly known as: DIPROLENE     Diclofenac Sodium 1 % gel gel  Commonly known as: VOLTAREN     fish oil 1000 MG capsule capsule     phenazopyridine 200 MG tablet  Commonly known as: PYRIDIUM     sulfamethoxazole-trimethoprim 800-160 MG per tablet  Commonly known as: BACTRIM DS,SEPTRA DS              Allergies   Allergen Reactions    Ace Inhibitors        Discharge Disposition:  Hospice/Home      Discharge Diet:  Diet Order   Procedures    Diet: Regular/House Diet; Texture: Regular Texture (IDDSI 7); Fluid Consistency: Thin (IDDSI 0)       Discharge Activity:   As tolerated    CODE STATUS:    Code Status and Medical Interventions:   Ordered at: 01/25/24 1609     Level Of Support Discussed With:    Patient     Code Status (Patient has no pulse and is not breathing):    No CPR (Do Not Attempt to Resuscitate)     Medical Interventions (Patient has pulse or is breathing):    Comfort Measures       No future appointments.  Additional Instructions for the Follow-ups that You Need to Schedule       Discharge Follow-up with PCP   As directed       Currently Documented PCP:    Rosibel Seymour MD    PCP Phone Number:    [phone]     Follow Up Details: Dr. Seymour (PCP) in 1 week        Discharge Follow-up with Specialty: Hospice on Monday   As directed      Specialty: Hospice on Monday               Follow-up Information       Rosibel Seymour MD .    Specialty: Internal Medicine  Why: Dr. Seymour (PCP) in 1 week  Contact information:  [id number]  [place] [id number] [phone]                             Additional Instructions for the Follow-ups that You Need to Schedule       Discharge Follow-up with PCP   As directed       Currently Documented PCP:    Rosibel Seymour MD    PCP Phone Number:    [phone]     Follow Up Details: Dr. Seymour (PCP) in 1 week        Discharge  Follow-up with Specialty: Hospice on Monday   As directed      Specialty: Hospice on Monday            Time Spent on Discharge:  Greater than 30 minutes      Cuauhtemoc Bentley MD  [facility]  01/27/24  08:37 EST

## 2024-01-29 ENCOUNTER — INFUSION (OUTPATIENT)
Dept: ONCOLOGY | Facility: HOSPITAL | Age: 69
End: 2024-01-29
Payer: MEDICARE

## 2024-01-29 DIAGNOSIS — Z79.899 HIGH RISK MEDICATION USE: Primary | ICD-10-CM

## 2024-01-29 DIAGNOSIS — C25.7 MALIGNANT NEOPLASM OF OTHER PARTS OF PANCREAS: ICD-10-CM

## 2024-01-31 ENCOUNTER — TELEPHONE (OUTPATIENT)
Dept: ONCOLOGY | Facility: CLINIC | Age: 69
End: 2024-01-31
Payer: MEDICARE

## 2024-01-31 NOTE — TELEPHONE ENCOUNTER
Provider: Dr. Boyer  Caller: Rahel with [facility]   Relationship to Patient: Provider  Call Back Phone Number: [phone]  Reason for Call: [facility] is wanting to know if pt is able to get another appt for pt to decide is Hospice is for her.

## 2024-02-06 NOTE — PROGRESS NOTES
Case Management Discharge Note      Final Note: Home with family. CKnabel    Provided Post Acute Provider List?: N/A  N/A Provider List Comment: Patient denies need  Provided Post Acute Provider Quality & Resource List?: N/A  N/A Quality & Resource List Comment: Patient denies need    Selected Continued Care - Discharged on 1/27/2024 Admission date: 1/19/2024 - Discharge disposition: Hospice/Home      Destination    No services have been selected for the patient.                Durable Medical Equipment    No services have been selected for the patient.                Dialysis/Infusion    No services have been selected for the patient.                Home Medical Care    No services have been selected for the patient.                Therapy    No services have been selected for the patient.                Community Resources    No services have been selected for the patient.                Community & DME    No services have been selected for the patient.                         Final Discharge Disposition Code: 01 - home or self-care